# Patient Record
Sex: FEMALE | Race: BLACK OR AFRICAN AMERICAN | NOT HISPANIC OR LATINO | Employment: OTHER | URBAN - METROPOLITAN AREA
[De-identification: names, ages, dates, MRNs, and addresses within clinical notes are randomized per-mention and may not be internally consistent; named-entity substitution may affect disease eponyms.]

---

## 2019-06-19 ENCOUNTER — HOSPITAL ENCOUNTER (EMERGENCY)
Facility: HOSPITAL | Age: 69
Discharge: HOME | End: 2019-06-19
Attending: EMERGENCY MEDICINE
Payer: MEDICARE

## 2019-06-19 VITALS
RESPIRATION RATE: 20 BRPM | TEMPERATURE: 98 F | DIASTOLIC BLOOD PRESSURE: 73 MMHG | HEART RATE: 61 BPM | SYSTOLIC BLOOD PRESSURE: 144 MMHG | OXYGEN SATURATION: 98 %
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
## 2019-06-19 DIAGNOSIS — S02.2XXB OPEN FRACTURE OF NASAL BONE, INITIAL ENCOUNTER: Primary | ICD-10-CM

## 2019-06-19 DIAGNOSIS — R04.0 EPISTAXIS: ICD-10-CM

## 2019-06-19 DIAGNOSIS — S09.90XA INJURY OF HEAD, INITIAL ENCOUNTER: ICD-10-CM

## 2019-06-19 LAB
ABO + RH BLD: NORMAL
ALBUMIN SERPL BCP-MCNC: 3.6 G/DL (ref 3.5–5.2)
ALP SERPL-CCNC: 74 U/L (ref 55–135)
ALT SERPL W/O P-5'-P-CCNC: 18 U/L (ref 10–44)
ANION GAP SERPL CALC-SCNC: 9 MMOL/L (ref 8–16)
AST SERPL-CCNC: 20 U/L (ref 10–40)
BASOPHILS # BLD AUTO: 0.02 K/UL (ref 0–0.2)
BASOPHILS NFR BLD: 0.2 % (ref 0–1.9)
BILIRUB SERPL-MCNC: 0.3 MG/DL (ref 0.1–1)
BLD GP AB SCN CELLS X3 SERPL QL: NORMAL
BUN SERPL-MCNC: 17 MG/DL (ref 8–23)
CALCIUM SERPL-MCNC: 9.8 MG/DL (ref 8.7–10.5)
CHLORIDE SERPL-SCNC: 106 MMOL/L (ref 95–110)
CO2 SERPL-SCNC: 26 MMOL/L (ref 23–29)
CREAT SERPL-MCNC: 1.2 MG/DL (ref 0.5–1.4)
DIFFERENTIAL METHOD: NORMAL
EOSINOPHIL # BLD AUTO: 0.1 K/UL (ref 0–0.5)
EOSINOPHIL NFR BLD: 1.1 % (ref 0–8)
ERYTHROCYTE [DISTWIDTH] IN BLOOD BY AUTOMATED COUNT: 13.8 % (ref 11.5–14.5)
EST. GFR  (AFRICAN AMERICAN): 53 ML/MIN/1.73 M^2
EST. GFR  (NON AFRICAN AMERICAN): 46 ML/MIN/1.73 M^2
GLUCOSE SERPL-MCNC: 100 MG/DL (ref 70–110)
HCT VFR BLD AUTO: 43.6 % (ref 37–48.5)
HGB BLD-MCNC: 14.1 G/DL (ref 12–16)
INR PPP: 1 (ref 0.8–1.2)
LYMPHOCYTES # BLD AUTO: 2.6 K/UL (ref 1–4.8)
LYMPHOCYTES NFR BLD: 31.1 % (ref 18–48)
MCH RBC QN AUTO: 30.3 PG (ref 27–31)
MCHC RBC AUTO-ENTMCNC: 32.3 G/DL (ref 32–36)
MCV RBC AUTO: 94 FL (ref 82–98)
MONOCYTES # BLD AUTO: 0.6 K/UL (ref 0.3–1)
MONOCYTES NFR BLD: 6.9 % (ref 4–15)
NEUTROPHILS # BLD AUTO: 5 K/UL (ref 1.8–7.7)
NEUTROPHILS NFR BLD: 60.6 % (ref 38–73)
PLATELET # BLD AUTO: 233 K/UL (ref 150–350)
PMV BLD AUTO: 10.6 FL (ref 9.2–12.9)
POTASSIUM SERPL-SCNC: 3.7 MMOL/L (ref 3.5–5.1)
PROT SERPL-MCNC: 7.3 G/DL (ref 6–8.4)
PROTHROMBIN TIME: 10 SEC (ref 9–12.5)
RBC # BLD AUTO: 4.66 M/UL (ref 4–5.4)
SODIUM SERPL-SCNC: 141 MMOL/L (ref 136–145)
WBC # BLD AUTO: 8.29 K/UL (ref 3.9–12.7)

## 2019-06-19 PROCEDURE — 96374 THER/PROPH/DIAG INJ IV PUSH: CPT

## 2019-06-19 PROCEDURE — 96375 TX/PRO/DX INJ NEW DRUG ADDON: CPT | Mod: 59

## 2019-06-19 PROCEDURE — 63600175 PHARM REV CODE 636 W HCPCS: Performed by: EMERGENCY MEDICINE

## 2019-06-19 PROCEDURE — 80053 COMPREHEN METABOLIC PANEL: CPT

## 2019-06-19 PROCEDURE — 25000003 PHARM REV CODE 250: Performed by: EMERGENCY MEDICINE

## 2019-06-19 PROCEDURE — 85610 PROTHROMBIN TIME: CPT

## 2019-06-19 PROCEDURE — 99284 EMERGENCY DEPT VISIT MOD MDM: CPT | Mod: 25

## 2019-06-19 PROCEDURE — 85025 COMPLETE CBC W/AUTO DIFF WBC: CPT

## 2019-06-19 PROCEDURE — 86901 BLOOD TYPING SEROLOGIC RH(D): CPT

## 2019-06-19 RX ORDER — ONDANSETRON 2 MG/ML
4 INJECTION INTRAMUSCULAR; INTRAVENOUS
Status: COMPLETED | OUTPATIENT
Start: 2019-06-19 | End: 2019-06-19

## 2019-06-19 RX ORDER — OXYMETAZOLINE HCL 0.05 %
1 SPRAY, NON-AEROSOL (ML) NASAL 2 TIMES DAILY
Qty: 15 ML | Refills: 0 | Status: SHIPPED | OUTPATIENT
Start: 2019-06-19 | End: 2019-06-22

## 2019-06-19 RX ORDER — OXYMETAZOLINE HCL 0.05 %
1 SPRAY, NON-AEROSOL (ML) NASAL
Status: COMPLETED | OUTPATIENT
Start: 2019-06-19 | End: 2019-06-19

## 2019-06-19 RX ORDER — HYDROCODONE BITARTRATE AND ACETAMINOPHEN 5; 325 MG/1; MG/1
1 TABLET ORAL EVERY 4 HOURS PRN
Qty: 12 TABLET | Refills: 0 | Status: SHIPPED | OUTPATIENT
Start: 2019-06-19

## 2019-06-19 RX ORDER — AMOXICILLIN AND CLAVULANATE POTASSIUM 875; 125 MG/1; MG/1
1 TABLET, FILM COATED ORAL 2 TIMES DAILY
Qty: 14 TABLET | Refills: 0 | Status: SHIPPED | OUTPATIENT
Start: 2019-06-19

## 2019-06-19 RX ORDER — LIDOCAINE HYDROCHLORIDE 10 MG/ML
5 INJECTION, SOLUTION EPIDURAL; INFILTRATION; INTRACAUDAL; PERINEURAL
Status: DISCONTINUED | OUTPATIENT
Start: 2019-06-19 | End: 2019-06-19

## 2019-06-19 RX ORDER — MORPHINE SULFATE 4 MG/ML
4 INJECTION, SOLUTION INTRAMUSCULAR; INTRAVENOUS
Status: COMPLETED | OUTPATIENT
Start: 2019-06-19 | End: 2019-06-19

## 2019-06-19 RX ORDER — TRANEXAMIC ACID 100 MG/ML
1000 INJECTION, SOLUTION INTRAVENOUS
Status: COMPLETED | OUTPATIENT
Start: 2019-06-19 | End: 2019-06-19

## 2019-06-19 RX ADMIN — Medication 1 SPRAY: at 02:06

## 2019-06-19 RX ADMIN — TRANEXAMIC ACID 1000 MG: 100 INJECTION, SOLUTION INTRAVENOUS at 03:06

## 2019-06-19 RX ADMIN — ONDANSETRON 4 MG: 2 INJECTION INTRAMUSCULAR; INTRAVENOUS at 03:06

## 2019-06-19 RX ADMIN — MORPHINE SULFATE 4 MG: 4 INJECTION INTRAVENOUS at 03:06

## 2019-06-19 NOTE — ED PROVIDER NOTES
Encounter Date: 6/19/2019    SCRIBE #1 NOTE: I, Michelle Ahumada, am scribing for, and in the presence of,  Dr. Lefort. I have scribed the entire note.       History     Chief Complaint   Patient presents with    Fall     pt presents to Ed today via EMS who reports pt tripped on a toy and landed face first on floor. + nasal bleeding pt reports she takes ASA 81 mg daily      Time seen by provider: 2:38 PM    This is a 69 y.o. female who presents to the ED via EMS due to a trip and fall. Patient landed face first onto the floor and started bleeding from her nose. Patient is holding a towel to her nose and is actively bleeding in ED. Patient is awake, alert, and oriented x 3. She also complains of a HA. Patient denies LOC, back pain, neck pain, trouble breathing, or any other symptoms at this time. EMS personnel reports that patient takes 81 mg of aspirin daily. Patient has a PMHx of HTN.    The history is provided by the patient and the EMS personnel.     Review of patient's allergies indicates:   Allergen Reactions    Adhesive     Erythromycin      Past Medical History:   Diagnosis Date    Gout     Hypertension      Past Surgical History:   Procedure Laterality Date    ANKLE SURGERY      CHOLECYSTECTOMY      HYSTERECTOMY      TONSILLECTOMY      TUBAL LIGATION       No family history on file.  Social History     Tobacco Use    Smoking status: Never Smoker   Substance Use Topics    Alcohol use: No    Drug use: No     Review of Systems   Constitutional: Negative for chills and fever.   HENT: Positive for nosebleeds. Negative for congestion, rhinorrhea and sore throat.    Eyes: Negative for redness and visual disturbance.   Respiratory: Negative for cough, shortness of breath and wheezing.    Cardiovascular: Negative for chest pain and palpitations.   Gastrointestinal: Negative for abdominal pain, diarrhea, nausea and vomiting.   Genitourinary: Negative for dysuria and hematuria.   Musculoskeletal: Negative  for back pain, myalgias and neck pain.   Skin: Positive for wound. Negative for rash.   Neurological: Positive for headaches. Negative for dizziness, weakness and light-headedness.   Psychiatric/Behavioral: Negative for confusion.   All other systems reviewed and are negative.      Physical Exam     Initial Vitals   BP Pulse Resp Temp SpO2   06/19/19 1448 06/19/19 1448 -- 06/19/19 1557 06/19/19 1448   (!) 187/93 73  98.3 °F (36.8 °C) 98 %      MAP       --                Physical Exam    Nursing note and vitals reviewed.  Constitutional: She appears well-developed and well-nourished. No distress.   HENT:   Head: Head is without raccoon's eyes and without Bond's sign.       Nose: Nasal deformity and septal deviation present. No nasal septal hematoma. Epistaxis is observed. Right sinus exhibits no maxillary sinus tenderness and no frontal sinus tenderness. Left sinus exhibits no maxillary sinus tenderness and no frontal sinus tenderness.   Mouth/Throat: No trismus in the jaw.   Eyes: Conjunctivae and EOM are normal.   Neck: Normal range of motion, full passive range of motion without pain and phonation normal. Neck supple.   No midline tenderness   Cardiovascular: Normal rate, regular rhythm and intact distal pulses.   Pulmonary/Chest: Breath sounds normal. No respiratory distress.   Abdominal: Soft. There is no tenderness.   Musculoskeletal: Normal range of motion. She exhibits no edema or tenderness.   Neurological: She is alert and oriented to person, place, and time. She has normal strength. No sensory deficit. GCS score is 15. GCS eye subscore is 4. GCS verbal subscore is 5. GCS motor subscore is 6.   Skin: Skin is warm and dry. Capillary refill takes less than 2 seconds. No pallor.   Psychiatric: She has a normal mood and affect.         ED Course   Procedures  Labs Reviewed   COMPREHENSIVE METABOLIC PANEL - Abnormal; Notable for the following components:       Result Value    eGFR if  53 (*)      eGFR if non  46 (*)     All other components within normal limits   CBC W/ AUTO DIFFERENTIAL   PROTIME-INR   TYPE & SCREEN          X-Rays:   Independently Interpreted Readings:   Other Readings:  Reviewed by myself, read by radiology.    Imaging Results          CT Maxillofacial Without Contrast (Final result)  Result time 06/19/19 16:50:25    Final result by Liz Moe MD (06/19/19 16:50:25)                 Impression:      Not significantly displaced nasal bone fracture.    Small air-fluid level in the right maxillary sinus, suggestive of a small hemosinus.    Mild nasal septal deviation toward the left.    Small right nomi bullosa.      Electronically signed by: Liz Moe MD  Date:    06/19/2019  Time:    16:50             Narrative:    EXAMINATION:  CT MAXILLOFACIAL WITHOUT CONTRAST    CLINICAL HISTORY:  Facial fracture(s);    TECHNIQUE:  Low dose axial images, sagittal and coronal reformations were obtained through the face.  Contrast was not administered.    COMPARISON:  None    FINDINGS:  The intracranial content visualized appear normal.  There is a small air-fluid level in the right maxillary sinus concerning for a hemosinus.  The remainder of the paranasal sinuses are well aerated.  The visualized mastoid air cells are well aerated.  There is a relatively nondisplaced nasal bone fracture.  The nasal septum is deviated toward the left.  The bilateral orbital walls and bilateral orbital rim appear to be intact.  The bilateral temporomandibular joints and zygomatic arches are intact.  The bilateral pterygoid bones are intact.  The mandible and maxilla are intact.  Secretion noted in the nasopharynx on the right.  The remainder of the visualized soft tissues appear within normal limits.  The upper cervical spine appear normal.                               CT Head Without Contrast (Final result)  Result time 06/19/19 16:48:47    Final result by Den Briones MD  (06/19/19 16:48:47)                 Impression:      Right nasal bone fracture.  No acute intracranial abnormality.      Electronically signed by: Den Briones MD  Date:    06/19/2019  Time:    16:48             Narrative:    EXAMINATION:  CT HEAD WITHOUT CONTRAST    CLINICAL HISTORY:  Head trauma, headache;    TECHNIQUE:  Low dose axial CT images obtained throughout the head without intravenous contrast. Sagittal and coronal reconstructions were performed.    COMPARISON:  None.    FINDINGS:  Intracranial compartment:    Ventricles and sulci are normal in size for age without evidence of hydrocephalus. No extra-axial blood or fluid collections.    The brain parenchyma appears normal. No parenchymal mass, hemorrhage, edema or major vascular distribution infarct.    Skull/extracranial contents (limited evaluation- mastoid air cells and paranasal sinuses are essentially clear.  There is fluid and membrane thickening in the right maxillary sinus.  Right nasal fracture is seen.                              Medical Decision Making:   Differential Diagnosis:   Epistaxis, facial fracture, intracranial hemorrhage, C-spine injury, septal hematoma  Independently Interpreted Test(s):   I have ordered and independently interpreted X-rays - see prior notes.  Clinical Tests:   Lab Tests: Reviewed and Ordered  Radiological Study: Ordered and Reviewed  ED Management:  Clear trip and fall mechanism.  Bleeding controlled with direct pressure, ice, and subsequent administration of Tranxenemic acid.  Observed over duration department stay without recurrence of bleeding.  Intracranial hemorrhage excluded by imaging.  C-spine cleared clinically.  Facial imaging remarkable for nasal fracture.  There is no appreciable septal hematoma though deviation noted on imaging.  Patient will be discharged with antibiotics, nasal spray, pain control, ENT follow-up.  Discussed indications for ED return, expected course of injury, and importance of  continued follow-up                      Clinical Impression:     1. Open fracture of nasal bone, initial encounter    2. Epistaxis    3. Injury of head, initial encounter    4. Fall, initial encounter        Disposition:   Disposition: Discharged  Condition: Stable       Scribe attestation I, Dr. Guy Lefort, personally performed the services described in this documentation. All medical record entries made by the scribe were at my direction and in my presence. I have reviewed the chart and agree that the record reflects my personal performance and is accurate and complete. Guy Lefort, MD.  6:51 PM 06/19/2019                   Guy J. Lefort, MD  06/19/19 [id number]

## 2019-06-19 NOTE — ED NOTES
"Patient in bed, alert and oriented, no bleeding at this time, states pain is "tolerable now", call light within reach, will continue to monitor   "

## 2020-08-26 NOTE — ED TRIAGE NOTES
Pt arrived from home via EMS post fall at home with epitaxis, patient in bed alert and oriented, denies LOC   yes

## 2022-06-02 ENCOUNTER — APPOINTMENT (RX ONLY)
Dept: URBAN - METROPOLITAN AREA CLINIC 87 | Facility: CLINIC | Age: 72
Setting detail: DERMATOLOGY
End: 2022-06-02

## 2022-06-02 DIAGNOSIS — L65.9 NONSCARRING HAIR LOSS, UNSPECIFIED: ICD-10-CM | Status: INADEQUATELY CONTROLLED

## 2022-06-02 PROCEDURE — ? COUNSELING

## 2022-06-02 PROCEDURE — ? PRESCRIPTION MEDICATION MANAGEMENT

## 2022-06-02 PROCEDURE — 11104 PUNCH BX SKIN SINGLE LESION: CPT

## 2022-06-02 PROCEDURE — ? BIOPSY BY PUNCH METHOD

## 2022-06-02 ASSESSMENT — LOCATION SIMPLE DESCRIPTION DERM
LOCATION SIMPLE: LEFT SCALP
LOCATION SIMPLE: LEFT FOREHEAD
LOCATION SIMPLE: RIGHT SCALP

## 2022-06-02 ASSESSMENT — LOCATION DETAILED DESCRIPTION DERM
LOCATION DETAILED: LEFT MEDIAL FRONTAL SCALP
LOCATION DETAILED: LEFT SUPERIOR FOREHEAD
LOCATION DETAILED: RIGHT CENTRAL FRONTAL SCALP

## 2022-06-02 ASSESSMENT — LOCATION ZONE DERM
LOCATION ZONE: FACE
LOCATION ZONE: SCALP

## 2022-06-02 NOTE — PROCEDURE: PRESCRIPTION MEDICATION MANAGEMENT
Detail Level: Zone
Render In Strict Bullet Format?: No
Plan: Patient will follow up in two weeks for SR and discuss results and treatment plan.

## 2022-06-16 ENCOUNTER — APPOINTMENT (RX ONLY)
Dept: URBAN - METROPOLITAN AREA CLINIC 87 | Facility: CLINIC | Age: 72
Setting detail: DERMATOLOGY
End: 2022-06-16

## 2022-06-16 DIAGNOSIS — Z48.02 ENCOUNTER FOR REMOVAL OF SUTURES: ICD-10-CM

## 2022-06-16 DIAGNOSIS — L66.81 CENTRAL CENTRIFUGAL CICATRICIAL ALOPECIA: ICD-10-CM | Status: INADEQUATELY CONTROLLED

## 2022-06-16 DIAGNOSIS — L66.8 OTHER CICATRICIAL ALOPECIA: ICD-10-CM | Status: INADEQUATELY CONTROLLED

## 2022-06-16 PROCEDURE — ? PRESCRIPTION MEDICATION MANAGEMENT

## 2022-06-16 PROCEDURE — ? SUTURE REMOVAL (GLOBAL PERIOD)

## 2022-06-16 PROCEDURE — 99214 OFFICE O/P EST MOD 30 MIN: CPT

## 2022-06-16 PROCEDURE — ? COUNSELING

## 2022-06-16 PROCEDURE — ? PRESCRIPTION

## 2022-06-16 RX ORDER — DOXYCYCLINE 100 MG/1
CAPSULE ORAL
Qty: 60 | Refills: 2 | Status: ERX

## 2022-06-16 RX ORDER — CLOBETASOL PROPIONATE 0.5 MG/ML
SOLUTION TOPICAL
Qty: 50 | Refills: 2 | Status: ERX | COMMUNITY
Start: 2022-06-16

## 2022-06-16 RX ADMIN — CLOBETASOL PROPIONATE 1: 0.5 SOLUTION TOPICAL at 00:00

## 2022-06-16 ASSESSMENT — LOCATION ZONE DERM: LOCATION ZONE: SCALP

## 2022-06-16 ASSESSMENT — LOCATION DETAILED DESCRIPTION DERM
LOCATION DETAILED: RIGHT MEDIAL FRONTAL SCALP
LOCATION DETAILED: RIGHT CENTRAL FRONTAL SCALP
LOCATION DETAILED: LEFT CENTRAL FRONTAL SCALP
LOCATION DETAILED: LEFT CENTRAL PARIETAL SCALP

## 2022-06-16 ASSESSMENT — LOCATION SIMPLE DESCRIPTION DERM
LOCATION SIMPLE: SCALP
LOCATION SIMPLE: RIGHT SCALP
LOCATION SIMPLE: LEFT SCALP

## 2022-06-16 NOTE — PROCEDURE: SUTURE REMOVAL (GLOBAL PERIOD)
Detail Level: Simple
Add 84067 Cpt? (Important Note: In 2017 The Use Of 51557 Is Being Tracked By Cms To Determine Future Global Period Reimbursement For Global Periods): no

## 2022-06-16 NOTE — PROCEDURE: PRESCRIPTION MEDICATION MANAGEMENT
Initiate Treatment: clobetasol 0.05 % scalp solution \\nApply to areas of hair loss on scalp three times a week\\nME - female hair loss (10% minoxidil, 0.01% latanoprost, 0.2% biotin)\\nSig: Apply to scalp QAM\\ndoxycycline monohydrate 100 mg capsule \\nTake 1 cap po BID, PC
Detail Level: Zone
Render In Strict Bullet Format?: No
Plan: Treatment-1\\nILK-5\\n3cc injected into scalp \\nLot#[id number]\\nEx 09/23\\nInjected by Dr. MUE

## 2022-07-28 ENCOUNTER — APPOINTMENT (RX ONLY)
Dept: URBAN - METROPOLITAN AREA CLINIC 87 | Facility: CLINIC | Age: 72
Setting detail: DERMATOLOGY
End: 2022-07-28

## 2022-07-28 ENCOUNTER — RX ONLY (OUTPATIENT)
Age: 72
Setting detail: RX ONLY
End: 2022-07-28

## 2022-07-28 DIAGNOSIS — L66.81 CENTRAL CENTRIFUGAL CICATRICIAL ALOPECIA: ICD-10-CM

## 2022-07-28 DIAGNOSIS — L66.8 OTHER CICATRICIAL ALOPECIA: ICD-10-CM

## 2022-07-28 PROCEDURE — ? PRESCRIPTION MEDICATION MANAGEMENT

## 2022-07-28 PROCEDURE — ? COUNSELING

## 2022-07-28 PROCEDURE — 99214 OFFICE O/P EST MOD 30 MIN: CPT

## 2022-07-28 RX ORDER — CLOBETASOL PROPIONATE 0.5 MG/ML
SOLUTION TOPICAL
Qty: 50 | Refills: 2 | Status: ERX

## 2022-07-28 ASSESSMENT — LOCATION DETAILED DESCRIPTION DERM
LOCATION DETAILED: RIGHT MEDIAL FRONTAL SCALP
LOCATION DETAILED: LEFT CENTRAL FRONTAL SCALP
LOCATION DETAILED: RIGHT CENTRAL FRONTAL SCALP

## 2022-07-28 ASSESSMENT — LOCATION ZONE DERM: LOCATION ZONE: SCALP

## 2022-07-28 ASSESSMENT — LOCATION SIMPLE DESCRIPTION DERM
LOCATION SIMPLE: LEFT SCALP
LOCATION SIMPLE: RIGHT SCALP

## 2022-07-28 NOTE — PROCEDURE: PRESCRIPTION MEDICATION MANAGEMENT
Detail Level: Zone
Render In Strict Bullet Format?: No
Plan: Treatment-2\\nILK-5\\n3cc injected into scalp \\nLot#[id number]\\nEx 10/23\\nInjected by Dr. MUE
Continue Regimen: clobetasol 0.05 % scalp solution \\nApply to areas of hair loss on scalp three times a week\\n\\nME - female hair loss (10% minoxidil, 0.01% latanoprost, 0.2% biotin)\\nSig: Apply to scalp QAM\\n\\ndoxycycline monohydrate 100 mg capsule \\nTake 1 cap po BID, PC

## 2022-09-08 ENCOUNTER — APPOINTMENT (RX ONLY)
Dept: URBAN - METROPOLITAN AREA CLINIC 87 | Facility: CLINIC | Age: 72
Setting detail: DERMATOLOGY
End: 2022-09-08

## 2022-09-08 ENCOUNTER — RX ONLY (OUTPATIENT)
Age: 72
Setting detail: RX ONLY
End: 2022-09-08

## 2022-09-08 DIAGNOSIS — L66.8 OTHER CICATRICIAL ALOPECIA: ICD-10-CM

## 2022-09-08 DIAGNOSIS — L66.81 CENTRAL CENTRIFUGAL CICATRICIAL ALOPECIA: ICD-10-CM

## 2022-09-08 PROCEDURE — 99214 OFFICE O/P EST MOD 30 MIN: CPT

## 2022-09-08 PROCEDURE — ? PRESCRIPTION MEDICATION MANAGEMENT

## 2022-09-08 PROCEDURE — ? COUNSELING

## 2022-09-08 RX ORDER — CLOBETASOL PROPIONATE 0.5 MG/ML
SOLUTION TOPICAL
Qty: 50 | Refills: 2 | Status: ERX

## 2022-09-08 ASSESSMENT — LOCATION DETAILED DESCRIPTION DERM
LOCATION DETAILED: RIGHT MEDIAL FRONTAL SCALP
LOCATION DETAILED: RIGHT CENTRAL FRONTAL SCALP
LOCATION DETAILED: LEFT CENTRAL FRONTAL SCALP

## 2022-09-08 ASSESSMENT — LOCATION SIMPLE DESCRIPTION DERM
LOCATION SIMPLE: RIGHT SCALP
LOCATION SIMPLE: LEFT SCALP

## 2022-09-08 ASSESSMENT — LOCATION ZONE DERM: LOCATION ZONE: SCALP

## 2022-09-08 NOTE — PROCEDURE: PRESCRIPTION MEDICATION MANAGEMENT
Detail Level: Zone
Render In Strict Bullet Format?: No
Plan: Treatment-3\\nILK-5\\n3cc injected into scalp \\nLot#[id number] / [id number]\\nEx 10/23 / 02/24\\nInjected by Dr. CREWS\\n\\nBriefly discussed shampoos.
Continue Regimen: clobetasol 0.05 % scalp solution \\nApply to areas of hair loss on scalp three times a week\\n\\nME - female hair loss (10% minoxidil, 0.01% latanoprost, 0.2% biotin)\\nSig: Apply to scalp QAM\\n\\ndoxycycline monohydrate 100 mg capsule \\nTake 1 cap po BID, PC

## 2022-09-27 ENCOUNTER — RX ONLY (OUTPATIENT)
Age: 72
Setting detail: RX ONLY
End: 2022-09-27

## 2022-09-28 ENCOUNTER — RX ONLY (OUTPATIENT)
Age: 72
Setting detail: RX ONLY
End: 2022-09-28

## 2022-09-28 RX ORDER — DOXYCYCLINE 100 MG/1
CAPSULE ORAL
Qty: 60 | Refills: 2 | Status: ERX

## 2022-10-20 ENCOUNTER — APPOINTMENT (RX ONLY)
Dept: URBAN - METROPOLITAN AREA CLINIC 87 | Facility: CLINIC | Age: 72
Setting detail: DERMATOLOGY
End: 2022-10-20

## 2022-10-20 ENCOUNTER — RX ONLY (OUTPATIENT)
Age: 72
Setting detail: RX ONLY
End: 2022-10-20

## 2022-10-20 DIAGNOSIS — L66.8 OTHER CICATRICIAL ALOPECIA: ICD-10-CM

## 2022-10-20 DIAGNOSIS — L66.81 CENTRAL CENTRIFUGAL CICATRICIAL ALOPECIA: ICD-10-CM

## 2022-10-20 PROCEDURE — ? COUNSELING

## 2022-10-20 PROCEDURE — ? PRESCRIPTION MEDICATION MANAGEMENT

## 2022-10-20 PROCEDURE — 99214 OFFICE O/P EST MOD 30 MIN: CPT

## 2022-10-20 RX ORDER — CLOBETASOL PROPIONATE 0.5 MG/ML
SOLUTION TOPICAL
Qty: 50 | Refills: 2 | Status: ERX

## 2022-10-20 ASSESSMENT — LOCATION ZONE DERM: LOCATION ZONE: SCALP

## 2022-10-20 ASSESSMENT — LOCATION SIMPLE DESCRIPTION DERM
LOCATION SIMPLE: LEFT SCALP
LOCATION SIMPLE: RIGHT SCALP

## 2022-10-20 NOTE — PROCEDURE: PRESCRIPTION MEDICATION MANAGEMENT
Detail Level: Zone
Render In Strict Bullet Format?: No
Plan: Treatment-4\\nILK-5\\n3cc injected into scalp \\nLot# [id number]\\nExp 02/24\\nInjected by Dr. MUE
Continue Regimen: clobetasol 0.05 % scalp solution \\nApply to areas of hair loss on scalp three times a week\\n\\nME - female hair loss (10% minoxidil, 0.2% biotin)\\nSig: Apply to scalp QAM\\n\\ndoxycycline monohydrate 100 mg capsule \\nTake 1 cap po BID, PC

## 2022-11-30 ENCOUNTER — APPOINTMENT (RX ONLY)
Dept: URBAN - METROPOLITAN AREA CLINIC 87 | Facility: CLINIC | Age: 72
Setting detail: DERMATOLOGY
End: 2022-11-30

## 2022-11-30 DIAGNOSIS — L66.8 OTHER CICATRICIAL ALOPECIA: ICD-10-CM

## 2022-11-30 DIAGNOSIS — L66.81 CENTRAL CENTRIFUGAL CICATRICIAL ALOPECIA: ICD-10-CM

## 2022-11-30 PROCEDURE — 99214 OFFICE O/P EST MOD 30 MIN: CPT

## 2022-11-30 PROCEDURE — ? PRESCRIPTION MEDICATION MANAGEMENT

## 2022-11-30 PROCEDURE — ? COUNSELING

## 2022-11-30 ASSESSMENT — LOCATION ZONE DERM: LOCATION ZONE: SCALP

## 2022-11-30 ASSESSMENT — LOCATION SIMPLE DESCRIPTION DERM
LOCATION SIMPLE: LEFT SCALP
LOCATION SIMPLE: RIGHT SCALP

## 2022-11-30 NOTE — PROCEDURE: PRESCRIPTION MEDICATION MANAGEMENT
Detail Level: Zone
Render In Strict Bullet Format?: No
Plan: Treatment-5\\nILK-5\\n3cc injected into scalp \\nLot# [id number]\\nExp 04/24\\nInjected by Dr. MUE
Continue Regimen: clobetasol 0.05 % scalp solution \\nApply to areas of hair loss on scalp three times a week\\n\\nME - female hair loss (10% minoxidil, 0.2% biotin)\\nSig: Apply to scalp QAM\\n\\ndoxycycline monohydrate 100 mg capsule \\nTake 1 cap po BID, PC

## 2022-12-29 ENCOUNTER — RX ONLY (OUTPATIENT)
Age: 72
Setting detail: RX ONLY
End: 2022-12-29

## 2022-12-29 RX ORDER — CLOBETASOL PROPIONATE 0.5 MG/ML
SOLUTION TOPICAL
Qty: 50 | Refills: 2 | Status: ERX

## 2022-12-30 ENCOUNTER — RX ONLY (OUTPATIENT)
Age: 72
Setting detail: RX ONLY
End: 2022-12-30

## 2022-12-30 RX ORDER — DOXYCYCLINE 100 MG/1
CAPSULE ORAL
Qty: 60 | Refills: 1 | Status: ERX

## 2023-01-11 ENCOUNTER — APPOINTMENT (RX ONLY)
Dept: URBAN - METROPOLITAN AREA CLINIC 87 | Facility: CLINIC | Age: 73
Setting detail: DERMATOLOGY
End: 2023-01-11

## 2023-01-11 DIAGNOSIS — L66.81 CENTRAL CENTRIFUGAL CICATRICIAL ALOPECIA: ICD-10-CM

## 2023-01-11 DIAGNOSIS — L66.8 OTHER CICATRICIAL ALOPECIA: ICD-10-CM

## 2023-01-11 PROCEDURE — ? PRESCRIPTION MEDICATION MANAGEMENT

## 2023-01-11 PROCEDURE — 99214 OFFICE O/P EST MOD 30 MIN: CPT

## 2023-01-11 PROCEDURE — ? COUNSELING

## 2023-01-11 PROCEDURE — ? PRESCRIPTION

## 2023-01-11 RX ORDER — FINASTERIDE 5 MG/1
TABLET, FILM COATED ORAL
Qty: 90 | Refills: 1 | Status: ERX

## 2023-01-11 ASSESSMENT — LOCATION ZONE DERM: LOCATION ZONE: SCALP

## 2023-01-11 ASSESSMENT — LOCATION DETAILED DESCRIPTION DERM
LOCATION DETAILED: RIGHT CENTRAL FRONTAL SCALP
LOCATION DETAILED: RIGHT MEDIAL FRONTAL SCALP
LOCATION DETAILED: LEFT CENTRAL FRONTAL SCALP

## 2023-01-11 ASSESSMENT — LOCATION SIMPLE DESCRIPTION DERM
LOCATION SIMPLE: RIGHT SCALP
LOCATION SIMPLE: LEFT SCALP

## 2023-01-11 NOTE — PROCEDURE: PRESCRIPTION MEDICATION MANAGEMENT
Initiate Treatment: finasteride 5 mg tablet \\n1 tablet QD
Detail Level: Zone
Render In Strict Bullet Format?: No
Discontinue Regimen: doxycycline monohydrate 100 mg capsule \\nTake 1 cap po BID, PC
Modify Regimen: ME - female hair loss (10% minoxidil, 0.2% biotin)\\nSig: Apply to scalp QAM\\n\\nChange to all ingredients, including latanoprost and Finasteride
Plan: Treatment-6\\nILK-5\\n3cc injected into scalp \\nLot# [id number]\\nExp 06/24\\nInjected by Dr. CREWS\\n\\nDone with ILK series for now.
Continue Regimen: clobetasol 0.05 % scalp solution \\nApply to areas of hair loss on scalp three times a week

## 2023-03-02 ENCOUNTER — RX ONLY (OUTPATIENT)
Age: 73
Setting detail: RX ONLY
End: 2023-03-02

## 2023-03-02 RX ORDER — CLOBETASOL PROPIONATE 0.5 MG/ML
SOLUTION TOPICAL
Qty: 50 | Refills: 2 | Status: ERX

## 2023-07-11 ENCOUNTER — APPOINTMENT (RX ONLY)
Dept: URBAN - METROPOLITAN AREA CLINIC 87 | Facility: CLINIC | Age: 73
Setting detail: DERMATOLOGY
End: 2023-07-11

## 2023-07-11 ENCOUNTER — RX ONLY (OUTPATIENT)
Age: 73
Setting detail: RX ONLY
End: 2023-07-11

## 2023-07-11 DIAGNOSIS — L66.81 CENTRAL CENTRIFUGAL CICATRICIAL ALOPECIA: ICD-10-CM

## 2023-07-11 DIAGNOSIS — L66.8 OTHER CICATRICIAL ALOPECIA: ICD-10-CM

## 2023-07-11 PROCEDURE — 99214 OFFICE O/P EST MOD 30 MIN: CPT

## 2023-07-11 PROCEDURE — ? PRESCRIPTION MEDICATION MANAGEMENT

## 2023-07-11 PROCEDURE — ? COUNSELING

## 2023-07-11 PROCEDURE — ? PRESCRIPTION

## 2023-07-11 RX ORDER — DOXYCYCLINE 100 MG/1
CAPSULE ORAL DAILY
Qty: 60 | Refills: 2 | Status: ERX

## 2023-07-11 RX ORDER — CLOBETASOL PROPIONATE 0.5 MG/ML
SOLUTION TOPICAL
Qty: 50 | Refills: 2 | Status: ERX

## 2023-07-11 RX ORDER — FINASTERIDE 5 MG/1
TABLET, FILM COATED ORAL
Qty: 90 | Refills: 1 | Status: ERX

## 2023-07-11 ASSESSMENT — LOCATION ZONE DERM: LOCATION ZONE: SCALP

## 2023-07-11 ASSESSMENT — LOCATION SIMPLE DESCRIPTION DERM
LOCATION SIMPLE: LEFT SCALP
LOCATION SIMPLE: RIGHT SCALP

## 2023-07-11 NOTE — PROCEDURE: MIPS QUALITY
Quality 226: Preventive Care And Screening: Tobacco Use: Screening And Cessation Intervention: Patient screened for tobacco use and is an ex/non-smoker
Quality 111:Pneumonia Vaccination Status For Older Adults: Patient received any pneumococcal conjugate or polysaccharide vaccine on or after their 60th birthday and before the end of the measurement period
Detail Level: Detailed
Quality 130: Documentation Of Current Medications In The Medical Record: Current Medications Documented
Quality 110: Preventive Care And Screening: Influenza Immunization: Influenza Immunization Administered during Influenza season
Quality 431: Preventive Care And Screening: Unhealthy Alcohol Use - Screening: Patient not identified as an unhealthy alcohol user when screened for unhealthy alcohol use using a systematic screening method

## 2023-07-11 NOTE — PROCEDURE: PRESCRIPTION MEDICATION MANAGEMENT
Initiate Treatment: finasteride 5 mg tablet \\n1 tablet QD\\n\\ndoxycycline monohydrate 100 mg capsule \\nTake 1 cap po BID
Detail Level: Zone
Render In Strict Bullet Format?: No
Continue Regimen: clobetasol 0.05 % scalp solution \\nApply to areas of hair loss on scalp three times a week\\n\\nME - female hair loss (10% minoxidil, 0.2% biotin)\\nApply to scalp QAM

## 2023-07-14 ENCOUNTER — RX ONLY (OUTPATIENT)
Age: 73
Setting detail: RX ONLY
End: 2023-07-14

## 2023-10-11 ENCOUNTER — APPOINTMENT (RX ONLY)
Dept: URBAN - METROPOLITAN AREA CLINIC 87 | Facility: CLINIC | Age: 73
Setting detail: DERMATOLOGY
End: 2023-10-11

## 2023-10-11 ENCOUNTER — RX ONLY (OUTPATIENT)
Age: 73
Setting detail: RX ONLY
End: 2023-10-11

## 2023-10-11 DIAGNOSIS — L66.81 CENTRAL CENTRIFUGAL CICATRICIAL ALOPECIA: ICD-10-CM | Status: IMPROVED

## 2023-10-11 DIAGNOSIS — L66.8 OTHER CICATRICIAL ALOPECIA: ICD-10-CM | Status: IMPROVED

## 2023-10-11 PROCEDURE — ? COUNSELING

## 2023-10-11 PROCEDURE — ? PRESCRIPTION MEDICATION MANAGEMENT

## 2023-10-11 PROCEDURE — 99214 OFFICE O/P EST MOD 30 MIN: CPT

## 2023-10-11 RX ORDER — CLOBETASOL PROPIONATE 0.5 MG/ML
SOLUTION TOPICAL
Qty: 50 | Refills: 2 | Status: ERX

## 2023-10-11 ASSESSMENT — LOCATION DETAILED DESCRIPTION DERM
LOCATION DETAILED: LEFT CENTRAL FRONTAL SCALP
LOCATION DETAILED: RIGHT MEDIAL FRONTAL SCALP
LOCATION DETAILED: RIGHT CENTRAL FRONTAL SCALP

## 2023-10-11 ASSESSMENT — LOCATION SIMPLE DESCRIPTION DERM
LOCATION SIMPLE: LEFT SCALP
LOCATION SIMPLE: RIGHT SCALP

## 2023-10-11 ASSESSMENT — LOCATION ZONE DERM: LOCATION ZONE: SCALP

## 2023-10-11 NOTE — PROCEDURE: PRESCRIPTION MEDICATION MANAGEMENT
Detail Level: Zone
Render In Strict Bullet Format?: No
Continue Regimen: clobetasol 0.05 % scalp solution \\nApply to areas of hair loss on scalp 4 times a week
Defer Treatment (Provide Reason For Deferment In Text Field Below): Finasteride, Doxycycline, and Hair loss solution pt. states hair falling out, feels like it interacts with other medications, itching and bumpy skin.

## 2024-04-11 ENCOUNTER — APPOINTMENT (RX ONLY)
Dept: URBAN - METROPOLITAN AREA CLINIC 87 | Facility: CLINIC | Age: 74
Setting detail: DERMATOLOGY
End: 2024-04-11

## 2024-04-11 DIAGNOSIS — L66.8 OTHER CICATRICIAL ALOPECIA: ICD-10-CM

## 2024-04-11 DIAGNOSIS — L66.81 CENTRAL CENTRIFUGAL CICATRICIAL ALOPECIA: ICD-10-CM

## 2024-04-11 PROCEDURE — ? PRESCRIPTION

## 2024-04-11 PROCEDURE — ? PHOTO-DOCUMENTATION

## 2024-04-11 PROCEDURE — ? COUNSELING

## 2024-04-11 PROCEDURE — 99214 OFFICE O/P EST MOD 30 MIN: CPT

## 2024-04-11 PROCEDURE — ? PRESCRIPTION MEDICATION MANAGEMENT

## 2024-04-11 ASSESSMENT — LOCATION SIMPLE DESCRIPTION DERM
LOCATION SIMPLE: RIGHT SCALP
LOCATION SIMPLE: LEFT SCALP

## 2024-04-11 ASSESSMENT — LOCATION ZONE DERM: LOCATION ZONE: SCALP

## 2024-04-11 NOTE — PROCEDURE: PRESCRIPTION MEDICATION MANAGEMENT
Initiate Treatment: Tofacitinib 2% cream - (2% tofacitinib (bulk)) - Apply to affected areas on scalp twice a day
Detail Level: Zone
Render In Strict Bullet Format?: No
Discontinue Regimen: clobetasol 0.05 % scalp solution

## 2024-04-29 NOTE — PROCEDURE: BIOPSY BY PUNCH METHOD
Detail Level: Simple
Was A Bandage Applied: Yes
Punch Size In Mm: 4
Biopsy Type: H and E
Anesthesia Type: 1% lidocaine with epinephrine
Anesthesia Volume In Cc (Will Not Render If 0): 0.8
Additional Anesthesia Volume In Cc (Will Not Render If 0): 0
Hemostasis: None
Epidermal Sutures: 4-0 Nylon
Wound Care: Petrolatum
Dressing: pressure dressing
Suture Removal: 14 days
Patient Will Remove Sutures At Home?: No
Lab: [id number]
Lab Facility: [id number]
Consent: Written consent was obtained and risks were reviewed including but not limited to scarring, infection, bleeding, scabbing, incomplete removal, nerve damage and allergy to anesthesia.
Post-Care Instructions: I reviewed with the patient in detail post-care instructions. Patient is to keep the biopsy site dry overnight, and then apply bacitracin twice daily until healed. Patient may apply hydrogen peroxide soaks to remove any crusting.
Home Suture Removal Text: Patient was provided a home suture removal kit and will remove their sutures at home.  If they have any questions or difficulties they will call the office.
Notification Instructions: Patient will be notified of biopsy results. However, patient instructed to call the office if not contacted within 2 weeks.
Billing Type: Third-Party Bill
Information: Selecting Yes will display possible errors in your note based on the variables you have selected. This validation is only offered as a suggestion for you. PLEASE NOTE THAT THE VALIDATION TEXT WILL BE REMOVED WHEN YOU FINALIZE YOUR NOTE. IF YOU WANT TO FAX A PRELIMINARY NOTE YOU WILL NEED TO TOGGLE THIS TO 'NO' IF YOU DO NOT WANT IT IN YOUR FAXED NOTE.
declines

## 2024-07-05 ENCOUNTER — TELEPHONE (OUTPATIENT)
Dept: ORTHOPEDICS | Facility: CLINIC | Age: 74
End: 2024-07-05
Payer: MEDICARE

## 2024-07-05 ENCOUNTER — HOSPITAL ENCOUNTER (EMERGENCY)
Facility: OTHER | Age: 74
Discharge: HOME | End: 2024-07-05
Attending: EMERGENCY MEDICINE
Payer: MEDICARE

## 2024-07-05 VITALS
DIASTOLIC BLOOD PRESSURE: 74 MMHG | TEMPERATURE: 98 F | OXYGEN SATURATION: 99 % | RESPIRATION RATE: 19 BRPM | WEIGHT: 240 LBS | HEIGHT: 67 IN | SYSTOLIC BLOOD PRESSURE: 142 MMHG | BODY MASS INDEX: 37.67 KG/M2 | HEART RATE: 65 BPM

## 2024-07-05 DIAGNOSIS — M25.561 CHRONIC PAIN OF BOTH KNEES: ICD-10-CM

## 2024-07-05 DIAGNOSIS — I25.10 CORONARY ARTERY DISEASE, UNSPECIFIED VESSEL OR LESION TYPE, UNSPECIFIED WHETHER ANGINA PRESENT, UNSPECIFIED WHETHER NATIVE OR TRANSPLANTED HEART: ICD-10-CM

## 2024-07-05 DIAGNOSIS — Z86.73 HX-TIA (TRANSIENT ISCHEMIC ATTACK): ICD-10-CM

## 2024-07-05 DIAGNOSIS — M35.3 POLYMYALGIA: ICD-10-CM

## 2024-07-05 DIAGNOSIS — J44.9 CHRONIC OBSTRUCTIVE PULMONARY DISEASE, UNSPECIFIED COPD TYPE: ICD-10-CM

## 2024-07-05 DIAGNOSIS — N30.01 ACUTE CYSTITIS WITH HEMATURIA: Primary | ICD-10-CM

## 2024-07-05 DIAGNOSIS — M25.562 CHRONIC PAIN OF BOTH KNEES: ICD-10-CM

## 2024-07-05 DIAGNOSIS — G89.29 CHRONIC PAIN OF BOTH KNEES: ICD-10-CM

## 2024-07-05 LAB
ALBUMIN SERPL BCP-MCNC: 3.6 G/DL (ref 3.5–5.2)
ALP SERPL-CCNC: 84 U/L (ref 55–135)
ALT SERPL W/O P-5'-P-CCNC: 14 U/L (ref 10–44)
ANION GAP SERPL CALC-SCNC: 9 MMOL/L (ref 8–16)
AST SERPL-CCNC: 17 U/L (ref 10–40)
BACTERIA #/AREA URNS HPF: ABNORMAL /HPF
BASOPHILS # BLD AUTO: 0.04 K/UL (ref 0–0.2)
BASOPHILS NFR BLD: 0.5 % (ref 0–1.9)
BILIRUB SERPL-MCNC: 0.5 MG/DL (ref 0.1–1)
BILIRUB UR QL STRIP: NEGATIVE
BNP SERPL-MCNC: 84 PG/ML (ref 0–99)
BUN SERPL-MCNC: 13 MG/DL (ref 8–23)
CALCIUM SERPL-MCNC: 9.5 MG/DL (ref 8.7–10.5)
CHLORIDE SERPL-SCNC: 113 MMOL/L (ref 95–110)
CLARITY UR: CLEAR
CO2 SERPL-SCNC: 21 MMOL/L (ref 23–29)
COLOR UR: YELLOW
CREAT SERPL-MCNC: 1.2 MG/DL (ref 0.5–1.4)
DIFFERENTIAL METHOD BLD: NORMAL
EOSINOPHIL # BLD AUTO: 0.1 K/UL (ref 0–0.5)
EOSINOPHIL NFR BLD: 0.7 % (ref 0–8)
ERYTHROCYTE [DISTWIDTH] IN BLOOD BY AUTOMATED COUNT: 13.5 % (ref 11.5–14.5)
EST. GFR  (NO RACE VARIABLE): 47 ML/MIN/1.73 M^2
GLUCOSE SERPL-MCNC: 95 MG/DL (ref 70–110)
GLUCOSE UR QL STRIP: NEGATIVE
HCT VFR BLD AUTO: 41.7 % (ref 37–48.5)
HCV AB SERPL QL IA: NEGATIVE
HGB BLD-MCNC: 13.6 G/DL (ref 12–16)
HGB UR QL STRIP: NEGATIVE
HIV 1+2 AB+HIV1 P24 AG SERPL QL IA: NEGATIVE
IMM GRANULOCYTES # BLD AUTO: 0.01 K/UL (ref 0–0.04)
IMM GRANULOCYTES NFR BLD AUTO: 0.1 % (ref 0–0.5)
KETONES UR QL STRIP: NEGATIVE
LEUKOCYTE ESTERASE UR QL STRIP: ABNORMAL
LYMPHOCYTES # BLD AUTO: 2.2 K/UL (ref 1–4.8)
LYMPHOCYTES NFR BLD: 29.1 % (ref 18–48)
MCH RBC QN AUTO: 30.2 PG (ref 27–31)
MCHC RBC AUTO-ENTMCNC: 32.6 G/DL (ref 32–36)
MCV RBC AUTO: 93 FL (ref 82–98)
MICROSCOPIC COMMENT: ABNORMAL
MONOCYTES # BLD AUTO: 0.5 K/UL (ref 0.3–1)
MONOCYTES NFR BLD: 6.7 % (ref 4–15)
NEUTROPHILS # BLD AUTO: 4.7 K/UL (ref 1.8–7.7)
NEUTROPHILS NFR BLD: 62.9 % (ref 38–73)
NITRITE UR QL STRIP: POSITIVE
NRBC BLD-RTO: 0 /100 WBC
PH UR STRIP: 5 [PH] (ref 5–8)
PLATELET # BLD AUTO: 193 K/UL (ref 150–450)
PMV BLD AUTO: 10.7 FL (ref 9.2–12.9)
POCT GLUCOSE: 89 MG/DL (ref 70–110)
POTASSIUM SERPL-SCNC: 4 MMOL/L (ref 3.5–5.1)
PROT SERPL-MCNC: 6.4 G/DL (ref 6–8.4)
PROT UR QL STRIP: ABNORMAL
RBC # BLD AUTO: 4.5 M/UL (ref 4–5.4)
RBC #/AREA URNS HPF: 1 /HPF (ref 0–4)
SODIUM SERPL-SCNC: 143 MMOL/L (ref 136–145)
SP GR UR STRIP: 1.02 (ref 1–1.03)
SQUAMOUS #/AREA URNS HPF: 7 /HPF
TROPONIN I SERPL DL<=0.01 NG/ML-MCNC: 0.01 NG/ML (ref 0–0.03)
URN SPEC COLLECT METH UR: ABNORMAL
UROBILINOGEN UR STRIP-ACNC: NEGATIVE EU/DL
WBC # BLD AUTO: 7.48 K/UL (ref 3.9–12.7)
WBC #/AREA URNS HPF: 7 /HPF (ref 0–5)

## 2024-07-05 PROCEDURE — 99285 EMERGENCY DEPT VISIT HI MDM: CPT | Mod: 25

## 2024-07-05 PROCEDURE — 80053 COMPREHEN METABOLIC PANEL: CPT | Performed by: NURSE PRACTITIONER

## 2024-07-05 PROCEDURE — 86803 HEPATITIS C AB TEST: CPT | Performed by: NURSE PRACTITIONER

## 2024-07-05 PROCEDURE — 87389 HIV-1 AG W/HIV-1&-2 AB AG IA: CPT | Performed by: NURSE PRACTITIONER

## 2024-07-05 PROCEDURE — 93005 ELECTROCARDIOGRAM TRACING: CPT

## 2024-07-05 PROCEDURE — 85025 COMPLETE CBC W/AUTO DIFF WBC: CPT | Performed by: NURSE PRACTITIONER

## 2024-07-05 PROCEDURE — 84484 ASSAY OF TROPONIN QUANT: CPT | Performed by: NURSE PRACTITIONER

## 2024-07-05 PROCEDURE — 96374 THER/PROPH/DIAG INJ IV PUSH: CPT

## 2024-07-05 PROCEDURE — 93010 ELECTROCARDIOGRAM REPORT: CPT | Mod: ,,, | Performed by: INTERNAL MEDICINE

## 2024-07-05 PROCEDURE — 63600175 PHARM REV CODE 636 W HCPCS: Performed by: NURSE PRACTITIONER

## 2024-07-05 PROCEDURE — 83880 ASSAY OF NATRIURETIC PEPTIDE: CPT | Performed by: NURSE PRACTITIONER

## 2024-07-05 PROCEDURE — 81000 URINALYSIS NONAUTO W/SCOPE: CPT | Performed by: NURSE PRACTITIONER

## 2024-07-05 PROCEDURE — 82962 GLUCOSE BLOOD TEST: CPT

## 2024-07-05 RX ORDER — PREDNISONE 20 MG/1
40 TABLET ORAL DAILY
Qty: 10 TABLET | Refills: 0 | Status: SHIPPED | OUTPATIENT
Start: 2024-07-05 | End: 2024-07-10

## 2024-07-05 RX ORDER — NITROFURANTOIN 25; 75 MG/1; MG/1
100 CAPSULE ORAL 2 TIMES DAILY
Qty: 10 CAPSULE | Refills: 0 | Status: SHIPPED | OUTPATIENT
Start: 2024-07-05 | End: 2024-07-10

## 2024-07-05 RX ORDER — DEXAMETHASONE SODIUM PHOSPHATE 4 MG/ML
10 INJECTION, SOLUTION INTRA-ARTICULAR; INTRALESIONAL; INTRAMUSCULAR; INTRAVENOUS; SOFT TISSUE
Status: COMPLETED | OUTPATIENT
Start: 2024-07-05 | End: 2024-07-05

## 2024-07-05 RX ADMIN — DEXAMETHASONE SODIUM PHOSPHATE 10 MG: 4 INJECTION INTRA-ARTICULAR; INTRALESIONAL; INTRAMUSCULAR; INTRAVENOUS; SOFT TISSUE at 12:07

## 2024-07-05 NOTE — ED TRIAGE NOTES
Pt presents to Ed today c/o bilateral shoulder pain, bilateral knee pain, and urinary frequency x 3 weeks since moving from [place]   Pt reports she was due to have knee replacement prior to move

## 2024-07-05 NOTE — ED PROVIDER NOTES
Source of History:  Patient    Chief complaint:  multiple compliants  (Reports bilat shoulder pain that radiates to bilateral ankle. Hx of arthritis. Pt also endorses frequent urination x2 weeks. +bilat knee swelling  )      HPI:  Sandra Lui is a 74 y.o. female presenting with complaint of bilateral shoulder pain and bilateral hip pain that began a few weeks ago, patient reports she has a history of polymyalgia for which she follows up with pain management.  She also endorses having some mild shortness of breath and chest pain that has been ongoing for the past 2 weeks.  Past medical history includes CAD, TIA, chronic pain, COPD.  Patient recently moved here from [place] and does not have a primary care provider yet.  She also endorses having urinary frequency for the last 2 weeks with some lower abdominal pressure but denies any dysuria or any flank pain.  She denies any fever/chills.    This is the extent to the patients complaints today here in the emergency department.    PMH:  As per HPI and below:  Past Medical History:   Diagnosis Date    Gout     Hypertension      Past Surgical History:   Procedure Laterality Date    ANKLE SURGERY      CHOLECYSTECTOMY      HYSTERECTOMY      TONSILLECTOMY      TUBAL LIGATION         Social History     Tobacco Use    Smoking status: Never   Substance Use Topics    Alcohol use: No    Drug use: No       Review of patient's allergies indicates:   Allergen Reactions    Adhesive     Erythromycin     Neosporin (neomycin-polymyx) Rash       ROS: As per HPI and below:  Constitutional: No fever.  No chills.  Eyes: No visual changes.  ENT: No sore throat. No ear pain.  Cardiovascular:  Chest pain  Respiratory:  Shortness of breath  GI: No abdominal pain.  No nausea or vomiting.  Genitourinary: No abnormal urination.  Neurologic: No headache. No focal weakness.  No numbness.  MSK: no back pain.  Multiple joint pain  Integument: No rashes or lesions.  Hematologic: No easy  "bruising.  Endocrine: No excessive thirst or urination.    Physical Exam:    BP (!) 142/74 (BP Location: Left arm, Patient Position: Sitting)   Pulse 65   Temp 98.2 °F (36.8 °C) (Oral)   Resp 19   Ht 5' 7" (1.702 m)   Wt 108.9 kg (240 lb)   SpO2 99%   BMI 37.59 kg/m²   Vitals:    07/05/24 0852 07/05/24 1303   BP: (!) 187/83 (!) 142/74   Pulse: (!) 59 65   Resp: 20 19   Temp: 97.5 °F (36.4 °C) 98.2 °F (36.8 °C)   TempSrc: Oral Oral   SpO2: 96% 99%   Weight: 108.9 kg (240 lb)    Height: 5' 7" (1.702 m)        Nursing note and vital signs reviewed.  Constitutional: No acute distress.  Well-appearing, non-toxic.  Eyes: No conjunctival injection.  Extraocular muscles are intact.  ENT: Oropharynx clear.  Mucous membranes are pink and moist.  Cardiovascular:  Regular rate and rhythm no murmurs gallops or rubs.  Chest/ Respiratory:  Clear to auscultation bilaterally without wheezing rhonchi or rales.  No chest wall tenderness, crepitus, bruising.    Abdomen: Soft.  Not distended.  Nontender.  No guarding.  No rebound. Non-peritoneal.  No CVA tenderness  Musculoskeletal: Good range of motion all joints.  No deformities.  Neck supple.  No meningismus.  No point tenderness over bilateral hips.  Skin: No rashes seen.  Good turgor.  No abrasions.  No ecchymoses.  Neuro: alert and oriented x3,  no focal neurological deficits.  Psych: Appropriate, conversant    Labs that have been ordered have been independently reviewed and interpreted by myself.    Labs Reviewed   URINALYSIS, REFLEX TO URINE CULTURE - Abnormal; Notable for the following components:       Result Value    Protein, UA Trace (*)     Nitrite, UA Positive (*)     Leukocytes, UA 1+ (*)     All other components within normal limits    Narrative:     Specimen Source->Urine   URINALYSIS MICROSCOPIC - Abnormal; Notable for the following components:    WBC, UA 7 (*)     All other components within normal limits    Narrative:     Specimen Source->Urine   COMPREHENSIVE " METABOLIC PANEL - Abnormal; Notable for the following components:    Chloride 113 (*)     CO2 21 (*)     eGFR 47 (*)     All other components within normal limits    Narrative:     Release to patient->Immediate   CBC W/ AUTO DIFFERENTIAL    Narrative:     Release to patient->Immediate   TROPONIN I    Narrative:     Release to patient->Immediate   B-TYPE NATRIURETIC PEPTIDE    Narrative:     Release to patient->Immediate   HIV 1 / 2 ANTIBODY    Narrative:     Release to patient->Immediate   HEPATITIS C ANTIBODY    Narrative:     Release to patient->Immediate   POCT GLUCOSE       X-Ray Chest AP Portable   Final Result      No acute cardiopulmonary process seen.         Electronically signed by: Agustina Montes   Date:    07/05/2024   Time:    11:55          No results found for this or any previous visit.    Differential Diagnosis:  Differential Diagnosis includes, but is not limited to:  ACS/MI, PE, aortic dissection, pneumothorax, cardiac tamponade, pericarditis/myocarditis, pneumonia, infection/abscess, lung mass, costochondritis/pleurisy, GERD, biliary disease, pancreatitis    MDM  Medical Decision Making  After complete evaluation, including thorough history and physical exam, I do not believe the patient has ACS or any major cardiac condition at this time. The patient's symptoms are most consistent with atypical chest pain, as the patient's EKG revealed no evidence of acute ischemia, troponin was within acceptable limits. HEART score was calculated to be 3, low risk for cardiac origin. The patient was treated with supportive care and improved.  UA with evidence of infection will discharge patient home with oral antibiotics.  I feel the patient is stable for D/C from the ED with outpatient risk stratification with their cardiologist.  Patient recently moved here from [place], internal Medicine, Orthopedic, Cardiology, pulmonology and pain management referrals were placed for the patient.      Amount and/or  Complexity of Data Reviewed  Labs: ordered. Decision-making details documented in ED Course.  Radiology: ordered.    Risk  Prescription drug management.        ED Course as of 07/05/24 1913 Fri Jul 05, 2024   1056 Leukocyte Esterase, UA(!): 1+ [AS]   1056 NITRITE UA(!): Positive [AS]   1056 WBC, UA(!): 7 [AS]   1137 WBC: 7.48 [AS]   1137 Hemoglobin: 13.6 [AS]   1137 Hematocrit: 41.7 [AS]   1138 Platelet Count: 193 [AS]   1138 POCT Glucose: 89 [AS]   1205 BNP: 84 [AS]   1205 Troponin I: 0.008 [AS]   1234 Sodium: 143 [AS]   1234 Glucose: 95 [AS]   1234 BUN: 13 [AS]   1234 Creatinine: 1.2 [AS]   1234 eGFR(!): 47 [AS]   1234 Anion Gap: 9 [AS]      ED Course User Index  [AS] Kimberley Sweet FNP       Diagnostic Impression:    1. Acute cystitis with hematuria    2. Polymyalgia    3. Chronic obstructive pulmonary disease, unspecified COPD type    4. Hx-TIA (transient ischemic attack)    5. Coronary artery disease, unspecified vessel or lesion type, unspecified whether angina present, unspecified whether native or transplanted heart    6. Chronic pain of both knees         ED Disposition Condition    Discharge Stable            ED Prescriptions       Medication Sig Dispense Start Date End Date Auth. Provider    predniSONE (DELTASONE) 20 MG tablet Take 2 tablets (40 mg total) by mouth once daily. for 5 days 10 tablet 7/5/2024 7/10/2024 Kimberley Sweet FNP    nitrofurantoin, macrocrystal-monohydrate, (MACROBID) 100 MG capsule Take 1 capsule (100 mg total) by mouth 2 (two) times daily. for 5 days 10 capsule 7/5/2024 7/10/2024 Kimberley Sweet FNP          Follow-up Information       Follow up With Specialties Details Why Contact Info    [facility] Emergency Dept Emergency Medicine Go to  If symptoms worsen [id number] [id number] [phone]             Kimberley Sweet FNP  07/05/24 1913

## 2024-07-05 NOTE — FIRST PROVIDER EVALUATION
Emergency Department TeleTriage Encounter Note      CHIEF COMPLAINT    Chief Complaint   Patient presents with    multiple compliants      Reports bilat shoulder pain that radiates to bilateral ankle. Hx of arthritis. Pt also endorses frequent urination x2 weeks. +bilat knee swelling         VITAL SIGNS   Initial Vitals [07/05/24 0852]   BP Pulse Resp Temp SpO2   (!) 187/83 (!) 59 20 97.5 °F (36.4 °C) 96 %      MAP       --            ALLERGIES    Review of patient's allergies indicates:   Allergen Reactions    Adhesive     Erythromycin     Neosporin (neomycin-polymyx) Rash       PROVIDER TRIAGE NOTE  This is a teletriage evaluation of a 74 y.o. female presenting to the ED with c/o bilateral shoulder pain to toes pain.  Pt states increased urination for the past 3 weeks.  Pt has been taking her tramadol with no relief.       PE: VSS.  NAD noted.     Plan: urine.      Limited physical exam via telehealth: The patient is awake, alert, answering questions appropriately and is not in respiratory distress. All ED beds are full at present; patient notified of this status.  Patient seen and medically screened by Nurse Practitioner via teletriage.      Initial orders will be placed and care will be transferred to an alternate provider when patient is roomed for a full evaluation. Any additional orders and the final disposition will be determined by that provider.         ORDERS  Labs Reviewed   URINALYSIS, REFLEX TO URINE CULTURE   POCT GLUCOSE       ED Orders (720h ago, onward)      Start Ordered     Status Ordering Provider    07/05/24 0921 07/05/24 0920  Urinalysis, Reflex to Urine Culture Urine, Clean Catch  STAT         Ordered ALEXIS FAJARDO    07/05/24 0854 07/05/24 0854  POCT glucose  Once         Final result EMERGENCY, DEPT PHYSICIAN              Virtual Visit Note: The provider triage portion of this emergency department evaluation and documentation was performed via [employer], a HIPAA-compliant  telemedicine application, in concert with a tele-presenter in the room. A face to face patient evaluation with one of my colleagues will occur once the patient is placed in an emergency department room.      DISCLAIMER: This note was prepared with [employer] voice recognition transcription software. Garbled syntax, mangled pronouns, and other bizarre constructions may be attributed to that software system.

## 2024-07-08 ENCOUNTER — TELEPHONE (OUTPATIENT)
Dept: ADMINISTRATIVE | Facility: OTHER | Age: 74
End: 2024-07-08
Payer: MEDICARE

## 2024-07-08 LAB
OHS QRS DURATION: 94 MS
OHS QTC CALCULATION: 451 MS

## 2024-07-08 NOTE — TELEPHONE ENCOUNTER
Unable to reach out to patient by phone as her number is disconnected to advise her to reach out to her insurance company for covering provider for Cardiology.

## 2024-07-08 NOTE — TELEPHONE ENCOUNTER
Unable to reach out to patient by phone as her number is disconnected to advise her to reach out to her insurance company for covering provider for Neurology.

## 2024-07-22 ENCOUNTER — TELEPHONE (OUTPATIENT)
Dept: ORTHOPEDICS | Facility: CLINIC | Age: 74
End: 2024-07-22
Payer: MEDICARE

## 2024-07-22 NOTE — TELEPHONE ENCOUNTER
The patients insurance has no benefits for services at this location. The patient will have to pay out of pocket for this visit. The patient can contact their insurance carrier to locate providers in their network. By proceeding I acknowledge that I have shared this information with the patient. Pt verbalized understanding   ----- Message from Marybeth Aguirre sent at 7/22/2024 10:36 AM CDT -----  Regarding: appt req  Contact: Pt  Type: Appointment Request    Caller is requesting an appointment for a new patient.    Name of Caller: pt  Reason for appointment: Severe knee pain  Would the patient rather a call back or a response via [facility]? Call back  Best Call Back Number: [phone]  Additional Information: Please call, Thank You

## 2024-07-23 DIAGNOSIS — M25.561 PAIN IN BOTH KNEES, UNSPECIFIED CHRONICITY: Primary | ICD-10-CM

## 2024-07-23 DIAGNOSIS — M25.562 PAIN IN BOTH KNEES, UNSPECIFIED CHRONICITY: Primary | ICD-10-CM

## 2024-07-26 ENCOUNTER — OFFICE VISIT (OUTPATIENT)
Dept: ORTHOPEDICS | Facility: CLINIC | Age: 74
End: 2024-07-26
Payer: MEDICARE

## 2024-07-26 ENCOUNTER — TELEPHONE (OUTPATIENT)
Dept: PULMONOLOGY | Facility: CLINIC | Age: 74
End: 2024-07-26
Payer: MEDICARE

## 2024-07-26 ENCOUNTER — HOSPITAL ENCOUNTER (OUTPATIENT)
Dept: RADIOLOGY | Facility: HOSPITAL | Age: 74
Discharge: HOME | End: 2024-07-26
Attending: ORTHOPAEDIC SURGERY
Payer: MEDICARE

## 2024-07-26 VITALS — BODY MASS INDEX: 42.06 KG/M2 | HEIGHT: 67 IN | WEIGHT: 268 LBS

## 2024-07-26 DIAGNOSIS — J44.9 CHRONIC OBSTRUCTIVE PULMONARY DISEASE, UNSPECIFIED COPD TYPE: Primary | ICD-10-CM

## 2024-07-26 DIAGNOSIS — M25.562 PAIN IN BOTH KNEES, UNSPECIFIED CHRONICITY: ICD-10-CM

## 2024-07-26 DIAGNOSIS — M25.561 PAIN IN BOTH KNEES, UNSPECIFIED CHRONICITY: ICD-10-CM

## 2024-07-26 DIAGNOSIS — G89.29 CHRONIC PAIN OF BOTH KNEES: ICD-10-CM

## 2024-07-26 DIAGNOSIS — M25.562 CHRONIC PAIN OF BOTH KNEES: ICD-10-CM

## 2024-07-26 DIAGNOSIS — M25.561 CHRONIC PAIN OF BOTH KNEES: ICD-10-CM

## 2024-07-26 DIAGNOSIS — M17.11 PRIMARY OSTEOARTHRITIS OF RIGHT KNEE: Primary | ICD-10-CM

## 2024-07-26 PROCEDURE — 1159F MED LIST DOCD IN RCRD: CPT | Mod: CPTII,S$GLB,, | Performed by: ORTHOPAEDIC SURGERY

## 2024-07-26 PROCEDURE — 73564 X-RAY EXAM KNEE 4 OR MORE: CPT | Mod: 26,50,, | Performed by: RADIOLOGY

## 2024-07-26 PROCEDURE — 73564 X-RAY EXAM KNEE 4 OR MORE: CPT | Mod: TC,50

## 2024-07-26 PROCEDURE — 99203 OFFICE O/P NEW LOW 30 MIN: CPT | Mod: S$GLB,,, | Performed by: ORTHOPAEDIC SURGERY

## 2024-07-26 PROCEDURE — 1125F AMNT PAIN NOTED PAIN PRSNT: CPT | Mod: CPTII,S$GLB,, | Performed by: ORTHOPAEDIC SURGERY

## 2024-07-26 PROCEDURE — 99999 PR PBB SHADOW E&M-EST. PATIENT-LVL III: CPT | Mod: PBBFAC,,, | Performed by: ORTHOPAEDIC SURGERY

## 2024-07-26 PROCEDURE — 1160F RVW MEDS BY RX/DR IN RCRD: CPT | Mod: CPTII,S$GLB,, | Performed by: ORTHOPAEDIC SURGERY

## 2024-07-26 RX ORDER — CHOLECALCIFEROL (VITAMIN D3) 25 MCG
1000 TABLET ORAL DAILY
COMMUNITY

## 2024-07-26 NOTE — PROGRESS NOTES
Subjective     Patient ID: Sandra Lui is a 74 y.o. female.    Chief Complaint: Pain of the Left Knee and Pain of the Right Knee    HPI:  Sandra Lui is a 74 y.o. female here with a multiple year history of bilateral knee pain, R>L. The patient is a  retiree. There was not a history of trauma.  The pain is severe The pain is located in the medial aspect of the knee. There is is radiation to the lower leg.  The pain is described as achy. The patient has not had prior surgery. It is aggravated by standing, walking, and with moderate activity.  I tis not alleviated by rest. There is not numbness or tingling of the lower extremity.  There is not back pain.  She  has tried medications or injections. They have not helped.  She does have difficulty getting in or out of a car, getting dressed, or going up or down stairs.  The patient does use an assistive device.    Was planning on having R TKA in April 2024 in [place], but she moved to [facility] with her daughter. She is seeking to establish care with an arthroplasty surgeon in [place].     History of TIA (x2) for which she takes daily aspirin + plavix  Endorses chronic kidney disease (previously told not to take NSAIDs)    Denies DM, cardiac history, blood clots.    Past Medical History:   Diagnosis Date    Gout     Hypertension      Past Surgical History:   Procedure Laterality Date    ANKLE SURGERY      CHOLECYSTECTOMY      HYSTERECTOMY      TONSILLECTOMY      TUBAL LIGATION           Review of Systems   Constitutional: Negative for chills and fever.   HENT:  Negative for congestion.    Cardiovascular:  Negative for chest pain and palpitations.   Respiratory:  Negative for cough and shortness of breath.    Endocrine: Negative for cold intolerance and heat intolerance.   Hematologic/Lymphatic: Does not bruise/bleed easily.   Skin:  Negative for color change and rash.   Musculoskeletal:  Positive for arthritis, joint pain, joint swelling and stiffness. Negative for  back pain and gout.   Gastrointestinal:  Negative for abdominal pain.   Genitourinary:  Negative for bladder incontinence.   Neurological:  Negative for dizziness and focal weakness.   Allergic/Immunologic: Negative for persistent infections.            Objective     General    Constitutional: She is oriented to person, place, and time. She appears well-developed and well-nourished.   HENT:   Head: Normocephalic and atraumatic.   Eyes: EOM are normal.   Cardiovascular:  Intact distal pulses.            Pulmonary/Chest: Effort normal.   Neurological: She is alert and oriented to person, place, and time.   Psychiatric: She has a normal mood and affect. Her behavior is normal.           Right Knee Exam     Inspection   Scars: absent  Swelling: absent    Range of Motion   The patient has normal right knee ROM.  Extension:  0   Flexion:  140     Tests   Ligament Examination   MCL - Valgus: normal (0 to 2mm)  LCL - Varus: normal  Patella   Patellar Tracking: normal    Other   Sensation: normal    Left Knee Exam     Range of Motion   Extension:  0   Flexion:  140     Tests   Stability   Lachman: normal (-1 to 2mm)   MCL - Valgus: normal (0 to 2mm)  LCL - Varus: normal (0 to 2mm)  Patella   Patellar Tracking: normal  Patellar Grind: positive    Other   Sensation: normal    Right Hip Exam   Right hip exam is normal.     Range of Motion   The patient has normal right hip ROM.    Tests   Stinchfield test: negative  Log Roll: negative  Left Hip Exam   Left hip exam is normal.    Range of Motion   The patient has normal left hip ROM.    Tests   Stinchfield test: negative  Log Roll: negative          Muscle Strength   Left Lower Extremity   Quadriceps:  5/5   Hamstrin/5     Vascular Exam     Right Pulses  Dorsalis Pedis:      2+  Posterior Tibial:      2+        Left Pulses  Dorsalis Pedis:      2+  Posterior Tibial:      2+        Edema  Right Lower Leg: absent  Left Lower Leg: absent      Physical Exam  Constitutional:      "  Appearance: She is well-developed and well-nourished.   HENT:      Head: Normocephalic and atraumatic.   Eyes:      Extraocular Movements: EOM normal.   Cardiovascular:      Pulses: Intact distal pulses.           Dorsalis pedis pulses are 2+ on the right side and 2+ on the left side.        Posterior tibial pulses are 2+ on the right side and 2+ on the left side.   Pulmonary:      Effort: Pulmonary effort is normal.   Musculoskeletal:      Right knee: No swelling.      Right lower leg: No edema.      Left lower leg: No edema.   Neurological:      Mental Status: She is alert and oriented to person, place, and time.   Psychiatric:         Mood and Affect: Mood and affect normal.         Behavior: Behavior normal.      Radiographs taken today and reviewed by me demonstrate severe arthritic change of the right KNEE(s).There  is not bone destruction.  There is not a fracture. The medial compartment is most involved.  There is a mild varus deformity.  The changes are tricompartmental. Bone on bone arthritis in right knee medial compartment.    L knee mild-moderate DJD, most significant in medial compartment.         Assessment and Plan     Encounter Diagnoses   Name Primary?    Chronic pain of both knees     Primary osteoarthritis of right knee Yes         Sandra LEELA Polk" was seen today for pain and pain.    Diagnoses and all orders for this visit:    Primary osteoarthritis of right knee  -     Ambulatory referral/consult to Orthopedics; Future    Chronic pain of both knees  -     Ambulatory referral/consult to Orthopedics      Patient presenting with osteoarthritis of the right knee, which is significantly affecting her ADL's. She was scheduled for R TKA in [place] prior to relocating. We again discussed her pathophysiology in detail with reference to x-rays, models, other visual aids as appropriate.  Treatment options were discussed in detail.  Questions were invited and answered to the patient's satisfaction.    Will " refer to arthroplasty partner for discussion of R TKA.

## 2024-07-29 PROBLEM — M17.11 PRIMARY OSTEOARTHRITIS OF RIGHT KNEE: Status: ACTIVE | Noted: 2024-07-29

## 2024-08-19 ENCOUNTER — OFFICE VISIT (OUTPATIENT)
Dept: INTERNAL MEDICINE | Facility: CLINIC | Age: 74
End: 2024-08-19
Payer: MEDICARE

## 2024-08-19 ENCOUNTER — LAB VISIT (OUTPATIENT)
Dept: LAB | Facility: HOSPITAL | Age: 74
End: 2024-08-19
Payer: MEDICARE

## 2024-08-19 VITALS
DIASTOLIC BLOOD PRESSURE: 77 MMHG | BODY MASS INDEX: 42.35 KG/M2 | WEIGHT: 269.81 LBS | HEIGHT: 67 IN | SYSTOLIC BLOOD PRESSURE: 146 MMHG | HEART RATE: 67 BPM

## 2024-08-19 DIAGNOSIS — R73.03 PRE-DIABETES: ICD-10-CM

## 2024-08-19 DIAGNOSIS — G47.33 OSA (OBSTRUCTIVE SLEEP APNEA): ICD-10-CM

## 2024-08-19 DIAGNOSIS — G45.9 TIA (TRANSIENT ISCHEMIC ATTACK): ICD-10-CM

## 2024-08-19 DIAGNOSIS — R35.0 URINARY FREQUENCY: ICD-10-CM

## 2024-08-19 DIAGNOSIS — K21.9 GASTROESOPHAGEAL REFLUX DISEASE, UNSPECIFIED WHETHER ESOPHAGITIS PRESENT: ICD-10-CM

## 2024-08-19 DIAGNOSIS — J45.909 ASTHMA, UNSPECIFIED ASTHMA SEVERITY, UNSPECIFIED WHETHER COMPLICATED, UNSPECIFIED WHETHER PERSISTENT: ICD-10-CM

## 2024-08-19 DIAGNOSIS — I65.23 BILATERAL CAROTID ARTERY STENOSIS: ICD-10-CM

## 2024-08-19 DIAGNOSIS — J44.9 CHRONIC OBSTRUCTIVE PULMONARY DISEASE, UNSPECIFIED COPD TYPE: ICD-10-CM

## 2024-08-19 DIAGNOSIS — M06.9 RHEUMATOID ARTHRITIS, INVOLVING UNSPECIFIED SITE, UNSPECIFIED WHETHER RHEUMATOID FACTOR PRESENT: ICD-10-CM

## 2024-08-19 DIAGNOSIS — M35.3 POLYMYALGIA RHEUMATICA: ICD-10-CM

## 2024-08-19 DIAGNOSIS — Z76.89 ENCOUNTER TO ESTABLISH CARE: ICD-10-CM

## 2024-08-19 DIAGNOSIS — I10 HYPERTENSION, UNSPECIFIED TYPE: ICD-10-CM

## 2024-08-19 DIAGNOSIS — M35.3 POLYMYALGIA RHEUMATICA: Primary | ICD-10-CM

## 2024-08-19 DIAGNOSIS — M35.3 POLYMYALGIA: ICD-10-CM

## 2024-08-19 LAB
ALBUMIN SERPL BCP-MCNC: 3.6 G/DL (ref 3.5–5.2)
ALP SERPL-CCNC: 85 U/L (ref 55–135)
ALT SERPL W/O P-5'-P-CCNC: 8 U/L (ref 10–44)
ANION GAP SERPL CALC-SCNC: 8 MMOL/L (ref 8–16)
AST SERPL-CCNC: 15 U/L (ref 10–40)
BASOPHILS # BLD AUTO: 0.04 K/UL (ref 0–0.2)
BASOPHILS NFR BLD: 0.5 % (ref 0–1.9)
BILIRUB SERPL-MCNC: 0.6 MG/DL (ref 0.1–1)
BUN SERPL-MCNC: 10 MG/DL (ref 8–23)
CALCIUM SERPL-MCNC: 10.1 MG/DL (ref 8.7–10.5)
CHLORIDE SERPL-SCNC: 110 MMOL/L (ref 95–110)
CHOLEST SERPL-MCNC: 128 MG/DL (ref 120–199)
CHOLEST/HDLC SERPL: 2.7 {RATIO} (ref 2–5)
CO2 SERPL-SCNC: 26 MMOL/L (ref 23–29)
CREAT SERPL-MCNC: 1.1 MG/DL (ref 0.5–1.4)
DIFFERENTIAL METHOD BLD: ABNORMAL
EOSINOPHIL # BLD AUTO: 0.1 K/UL (ref 0–0.5)
EOSINOPHIL NFR BLD: 0.7 % (ref 0–8)
ERYTHROCYTE [DISTWIDTH] IN BLOOD BY AUTOMATED COUNT: 13.5 % (ref 11.5–14.5)
EST. GFR  (NO RACE VARIABLE): 52.1 ML/MIN/1.73 M^2
ESTIMATED AVG GLUCOSE: 117 MG/DL (ref 68–131)
GLUCOSE SERPL-MCNC: 94 MG/DL (ref 70–110)
HBA1C MFR BLD: 5.7 % (ref 4–5.6)
HCT VFR BLD AUTO: 46.1 % (ref 37–48.5)
HDLC SERPL-MCNC: 47 MG/DL (ref 40–75)
HDLC SERPL: 36.7 % (ref 20–50)
HGB BLD-MCNC: 14.1 G/DL (ref 12–16)
IMM GRANULOCYTES # BLD AUTO: 0.01 K/UL (ref 0–0.04)
IMM GRANULOCYTES NFR BLD AUTO: 0.1 % (ref 0–0.5)
LDLC SERPL CALC-MCNC: 58.8 MG/DL (ref 63–159)
LYMPHOCYTES # BLD AUTO: 2.7 K/UL (ref 1–4.8)
LYMPHOCYTES NFR BLD: 34.9 % (ref 18–48)
MCH RBC QN AUTO: 30.1 PG (ref 27–31)
MCHC RBC AUTO-ENTMCNC: 30.6 G/DL (ref 32–36)
MCV RBC AUTO: 98 FL (ref 82–98)
MONOCYTES # BLD AUTO: 0.7 K/UL (ref 0.3–1)
MONOCYTES NFR BLD: 8.5 % (ref 4–15)
NEUTROPHILS # BLD AUTO: 4.2 K/UL (ref 1.8–7.7)
NEUTROPHILS NFR BLD: 55.3 % (ref 38–73)
NONHDLC SERPL-MCNC: 81 MG/DL
NRBC BLD-RTO: 0 /100 WBC
PLATELET # BLD AUTO: 203 K/UL (ref 150–450)
PMV BLD AUTO: 11.1 FL (ref 9.2–12.9)
POTASSIUM SERPL-SCNC: 4.1 MMOL/L (ref 3.5–5.1)
PROT SERPL-MCNC: 6.8 G/DL (ref 6–8.4)
RBC # BLD AUTO: 4.69 M/UL (ref 4–5.4)
SODIUM SERPL-SCNC: 144 MMOL/L (ref 136–145)
TRIGL SERPL-MCNC: 111 MG/DL (ref 30–150)
TSH SERPL DL<=0.005 MIU/L-ACNC: 1.89 UIU/ML (ref 0.4–4)
WBC # BLD AUTO: 7.62 K/UL (ref 3.9–12.7)

## 2024-08-19 PROCEDURE — 84443 ASSAY THYROID STIM HORMONE: CPT

## 2024-08-19 PROCEDURE — 36415 COLL VENOUS BLD VENIPUNCTURE: CPT

## 2024-08-19 PROCEDURE — 83036 HEMOGLOBIN GLYCOSYLATED A1C: CPT

## 2024-08-19 PROCEDURE — 80061 LIPID PANEL: CPT

## 2024-08-19 PROCEDURE — 99999 PR PBB SHADOW E&M-EST. PATIENT-LVL V: CPT | Mod: PBBFAC,GC,,

## 2024-08-19 PROCEDURE — 85025 COMPLETE CBC W/AUTO DIFF WBC: CPT

## 2024-08-19 PROCEDURE — 80053 COMPREHEN METABOLIC PANEL: CPT

## 2024-08-19 PROCEDURE — 81003 URINALYSIS AUTO W/O SCOPE: CPT

## 2024-08-19 RX ORDER — CLOPIDOGREL BISULFATE 75 MG/1
75 TABLET ORAL DAILY
COMMUNITY

## 2024-08-19 RX ORDER — CHOLECALCIFEROL (VITAMIN D3) 25 MCG
1000 TABLET ORAL DAILY
COMMUNITY

## 2024-08-19 RX ORDER — ROSUVASTATIN CALCIUM 20 MG/1
20 TABLET, COATED ORAL NIGHTLY
COMMUNITY

## 2024-08-19 RX ORDER — CALCIUM CARBONATE 600 MG
1200 TABLET ORAL ONCE
COMMUNITY

## 2024-08-19 RX ORDER — COLCHICINE 0.6 MG/1
0.6 TABLET ORAL DAILY PRN
COMMUNITY

## 2024-08-19 RX ORDER — FAMOTIDINE 40 MG/1
40 TABLET, FILM COATED ORAL DAILY
COMMUNITY

## 2024-08-19 RX ORDER — METOPROLOL SUCCINATE 25 MG/1
25 TABLET, EXTENDED RELEASE ORAL DAILY
COMMUNITY

## 2024-08-19 RX ORDER — ACETAMINOPHEN 500 MG
500 TABLET ORAL DAILY
COMMUNITY

## 2024-08-19 RX ORDER — FUROSEMIDE 20 MG/1
20 TABLET ORAL 2 TIMES DAILY
COMMUNITY

## 2024-08-19 RX ORDER — GABAPENTIN 300 MG/1
300 CAPSULE ORAL 2 TIMES DAILY
COMMUNITY

## 2024-08-19 NOTE — PROGRESS NOTES
Subjective     Chief Complaint:    History of Present Illness:  Ms. Sandra Lui is a 74 y.o. female who recently moved back to [place] from [place], who is a known case of RUSH on CPAP at night, TIA's x2 (last one was 1.5 years ago) on aspirin, carotid stenosis bilaterally on plavix (used to follow with vascular in [place]- not amneable to surgery at the time being), HTN, bronchial asthma, back pain and disc prolapse, biltateral knee pain (will undergo replacement with Dr. Delgado) who is here to establish care as well as have concerns of her recent urinary frequency that occurred last night. She also said her PMR flared last night. She mentioned during the encounter that she had multiple doctors from different specialties seeing her in [place] and that [place] [facility] [place]    She started having increased urinary frequency since last night in the absence of fever, CVA angle tenderness, or dysuria or hematuria. She said that she came to  [facility] ER and had the same manifestation. She was late discharged on antibiotics, for 7 days, she completed the entire course at home.    She also complained of diarrhea since last night.. She says she passed stool 4 times since last night- that were not watery but rather soft. No fever. Mild abdominal pain.    She has been feeling like her PMR is flaring lately, by which she means that she is experiencing proximal muscle weakness and unable to lift her arms above her head. She stressed on the fact that she needs to followup with a rheumatologist here regarding her PMR and RA. The latter she says she was diagnosed by but never treated because she changed her insurance and her rheumatologist at that time did not accept her new insurance.    She did not report any chest pain, seizures, vomiting,    Baseline at home: walks around with a cane. Lives with her daughter. Marital status: single. Smoking: never. Alcohol: socially (once a year). Retired nurse.        PAST HISTORY:     Past Medical History:   Diagnosis Date    Gout     Hypertension        Past Surgical History:   Procedure Laterality Date    ANKLE SURGERY      CHOLECYSTECTOMY      HYSTERECTOMY      TONSILLECTOMY      TUBAL LIGATION         No family history on file.    Social History     Socioeconomic History    Marital status: Single   Tobacco Use    Smoking status: Never   Substance and Sexual Activity    Alcohol use: No    Drug use: No     Social Determinants of Health     Financial Resource Strain: Medium Risk (8/16/2024)    Overall Financial Resource Strain (CARDIA)     Difficulty of Paying Living Expenses: Somewhat hard   Food Insecurity: Food Insecurity Present (8/16/2024)    Hunger Vital Sign     Worried About Running Out of Food in the Last Year: Sometimes true     Ran Out of Food in the Last Year: Sometimes true   Physical Activity: Inactive (8/16/2024)    Exercise Vital Sign     Days of Exercise per Week: 0 days     Minutes of Exercise per Session: 10 min   Stress: No Stress Concern Present (8/16/2024)    [employer] - Occupational Stress Questionnaire     Feeling of Stress : Only a little   Housing Stability: Unknown (8/16/2024)    Housing Stability Vital Sign     Unable to Pay for Housing in the Last Year: No       MEDICATIONS & ALLERGIES:     Current Outpatient Medications on File Prior to Visit   Medication Sig    amoxicillin-clavulanate 875-125mg (AUGMENTIN) 875-125 mg per tablet Take 1 tablet by mouth 2 (two) times daily.    aspirin (ECOTRIN) 81 MG EC tablet Take 81 mg by mouth once daily.    diazepam (VALIUM) 5 MG tablet Take 1 tablet (5 mg total) by mouth every 6 (six) hours as needed for Anxiety.    gabapentin (NEURONTIN) 300 MG capsule Take 300 mg by mouth 2 (two) times daily.    HYDROcodone-acetaminophen (NORCO) 5-325 mg per tablet Take 1 tablet by mouth every 4 (four) hours as needed for Pain.    losartan (COZAAR) 50 MG tablet Take 50 mg by mouth once  "daily.    metoprolol succinate (TOPROL-XL) 50 MG 24 hr tablet Take 50 mg by mouth 2 (two) times daily.    multivitamin (THERAGRAN) per tablet Take 1 tablet by mouth once daily.    vitamin D (VITAMIN D3) 1000 units Tab Take 1,000 Units by mouth once daily.     No current facility-administered medications on file prior to visit.       Review of patient's allergies indicates:   Allergen Reactions    Adhesive     Erythromycin     Neosporin (neomycin-polymyx) Rash       OBJECTIVE:     Vital Signs:  Vitals:    08/19/24 1411   BP: (!) 146/77   BP Location: Right arm   Patient Position: Sitting   BP Method: Large (Manual)   Pulse: 67   Weight: 122.4 kg (269 lb 13.5 oz)   Height: 5' 7" (1.702 m)       Body mass index is 42.26 kg/m².     Physical Exam:  General:  Well developed, well nourished, no acute distress  Head: Normocephalic, atraumatic  Eyes: PERRL  CVS:  RRR, S1 and S2 normal, no murmurs, rubs, gallops, 2+ peripheral pulses  Resp:  Lungs clear to auscultation, no wheezes, rales, rhonchi, cough  GI:  Abdomen soft, non-tender, non-distended, normoactive bowel sounds  MSK:  No muscle atrophy, cyanosis, peripheral edema   Skin:  No rashes, ulcers, erythema (except from chronic venous stasis changes)  Neuro:  No focal deficits noted  Psych:  Appropriate mood and affect    Laboratory  Lab Results   Component Value Date    WBC 7.48 07/05/2024    HGB 13.6 07/05/2024    HCT 41.7 07/05/2024    MCV 93 07/05/2024     07/05/2024     @[id number](GLU,NA,K,Cl,CO2,BUN,Creatinine,Calcium,MG)@  Lab Results   Component Value Date    INR 1.0 06/19/2019     No results found for: "HGBA1C"  No results for input(s): "POCTGLUCOSE" in the last 72 hours.    Diagnostic Results:  Labs: Reviewed    ASSESSMENT & PLAN:   Ms. Sandra Lui is a 74 y.o. female who presents today with     Sandra SWENSON "Amanda" was seen today for establish care.    Diagnoses and all orders for this visit:    Polymyalgia rheumatica  -     Ambulatory " referral/consult to Rheumatology; Future  -     HEMOGLOBIN A1C; Future  -     CBC W/ AUTO DIFFERENTIAL; Future  -     COMPREHENSIVE METABOLIC PANEL; Future  -     TSH; Future    Encounter to establish care  -     HEMOGLOBIN A1C; Future  -     COMPREHENSIVE METABOLIC PANEL; Future  -     TSH; Future    Hypertension, unspecified type  -     HEMOGLOBIN A1C; Future  -     COMPREHENSIVE METABOLIC PANEL; Future  -     TSH; Future    TIA (transient ischemic attack)  -     Ambulatory referral/consult to Neurology; Future  -     HEMOGLOBIN A1C; Future  -     LIPID PANEL; Future  -     CBC W/ AUTO DIFFERENTIAL; Future  -     COMPREHENSIVE METABOLIC PANEL; Future  -     TSH; Future    RUSH (obstructive sleep apnea)  -     Ambulatory referral/consult to Pulmonology; Future  -     HEMOGLOBIN A1C; Future  -     COMPREHENSIVE METABOLIC PANEL; Future  -     TSH; Future    Asthma, unspecified asthma severity, unspecified whether complicated, unspecified whether persistent  -     Ambulatory referral/consult to Pulmonology; Future  -     HEMOGLOBIN A1C; Future  -     COMPREHENSIVE METABOLIC PANEL; Future  -     TSH; Future    Rheumatoid arthritis, involving unspecified site, unspecified whether rheumatoid factor present  -     Ambulatory referral/consult to Rheumatology; Future  -     HEMOGLOBIN A1C; Future  -     CBC W/ AUTO DIFFERENTIAL; Future  -     COMPREHENSIVE METABOLIC PANEL; Future  -     TSH; Future    Gastroesophageal reflux disease, unspecified whether esophagitis present  -     HEMOGLOBIN A1C; Future  -     COMPREHENSIVE METABOLIC PANEL; Future  -     TSH; Future    Bilateral carotid artery stenosis  -     Ambulatory referral/consult to Vascular Surgery; Future  -     HEMOGLOBIN A1C; Future  -     COMPREHENSIVE METABOLIC PANEL; Future  -     TSH; Future    Urinary frequency  -     HEMOGLOBIN A1C; Future  -     TSH; Future  -     Urinalysis, Reflex to Urine Culture Urine, Clean Catch    Pre-diabetes Screening  -      HEMOGLOBIN A1C; Future  -     TSH; Future    Polymyalgia  -     Ambulatory referral/consult to Internal Medicine    Chronic obstructive pulmonary disease, unspecified COPD type  -     Ambulatory referral/consult to Internal Medicine             RTC in 3 months    Case discussed with Dr Humberto Aponte MD  Internal Medicine PGY1  [facility]  [address], [place] [id number] [phone]

## 2024-08-20 LAB
BILIRUB UR QL STRIP: NEGATIVE
CLARITY UR REFRACT.AUTO: CLEAR
COLOR UR AUTO: YELLOW
GLUCOSE UR QL STRIP: NEGATIVE
HGB UR QL STRIP: NEGATIVE
KETONES UR QL STRIP: NEGATIVE
LEUKOCYTE ESTERASE UR QL STRIP: NEGATIVE
NITRITE UR QL STRIP: NEGATIVE
PH UR STRIP: 6 [PH] (ref 5–8)
PROT UR QL STRIP: NEGATIVE
SP GR UR STRIP: 1.02 (ref 1–1.03)
URN SPEC COLLECT METH UR: NORMAL

## 2024-08-21 ENCOUNTER — TELEPHONE (OUTPATIENT)
Dept: INTERNAL MEDICINE | Facility: CLINIC | Age: 74
End: 2024-08-21
Payer: MEDICARE

## 2024-08-22 NOTE — TELEPHONE ENCOUNTER
Called pt to notify of lab results. Pt was given opportunity for ask questions or raise any concerns. All questions were answered. Pt agreed with plan.

## 2024-08-22 NOTE — PROGRESS NOTES
I did not personally examine the patient. Patient's record reviewed and I agree with the resident's assessment and plan.

## 2024-08-28 ENCOUNTER — TELEPHONE (OUTPATIENT)
Dept: VASCULAR SURGERY | Facility: CLINIC | Age: 74
End: 2024-08-28
Payer: MEDICARE

## 2024-08-28 DIAGNOSIS — I65.23 BILATERAL CAROTID ARTERY STENOSIS: Primary | ICD-10-CM

## 2024-08-30 ENCOUNTER — HOSPITAL ENCOUNTER (OUTPATIENT)
Dept: VASCULAR SURGERY | Facility: CLINIC | Age: 74
Discharge: HOME | End: 2024-08-30
Attending: SURGERY
Payer: MEDICARE

## 2024-08-30 DIAGNOSIS — I65.21 STENOSIS OF RIGHT CAROTID ARTERY: Primary | ICD-10-CM

## 2024-08-30 DIAGNOSIS — I65.23 BILATERAL CAROTID ARTERY STENOSIS: ICD-10-CM

## 2024-08-30 PROCEDURE — 93880 EXTRACRANIAL BILAT STUDY: CPT | Mod: S$GLB,,, | Performed by: SURGERY

## 2024-09-03 ENCOUNTER — INITIAL CONSULT (OUTPATIENT)
Dept: VASCULAR SURGERY | Facility: CLINIC | Age: 74
End: 2024-09-03
Payer: MEDICARE

## 2024-09-03 VITALS
BODY MASS INDEX: 38.76 KG/M2 | HEART RATE: 75 BPM | WEIGHT: 246.94 LBS | SYSTOLIC BLOOD PRESSURE: 146 MMHG | DIASTOLIC BLOOD PRESSURE: 80 MMHG | TEMPERATURE: 98 F | HEIGHT: 67 IN

## 2024-09-03 DIAGNOSIS — G45.9 TIA (TRANSIENT ISCHEMIC ATTACK): Primary | ICD-10-CM

## 2024-09-03 DIAGNOSIS — I65.23 BILATERAL CAROTID ARTERY STENOSIS: ICD-10-CM

## 2024-09-03 PROCEDURE — 99204 OFFICE O/P NEW MOD 45 MIN: CPT | Mod: S$GLB,,, | Performed by: SURGERY

## 2024-09-03 PROCEDURE — 99999 PR PBB SHADOW E&M-EST. PATIENT-LVL IV: CPT | Mod: PBBFAC,,, | Performed by: SURGERY

## 2024-09-03 NOTE — PROGRESS NOTES
VASCULAR SURGERY SERVICE    REFERRING DOCTOR: Tisha Aponte MDRef Provider (PCP)     CHIEF COMPLAINT:  ? carotid stenosis    HISTORY OF PRESENT ILLNESS: Sandra Lui is a 74 y.o. female who has recently relocated from [place].  While there she reports 2 episodes of TIAs is manifested by a proximally 10 minutes of dysarthria 1 2022 and once in 2023.   She was begun on dual antiplatelet therapy and statin has had none since.  She was evidently told by a vascular specialist she had some carotid stenosis but not enough to intervene upon.    Care everywhere imaging from [place] in 2022 demonstrated a CTA which showed 0% stenosis for carotid arteries, as well as a carotid duplex at also showed no evidence of carotid disease.    She reports no history of atrial fibrillation.  She says she has had borderline diabetes but has never required any treatment for it    Past Medical History:   Diagnosis Date    Gout     Hypertension        Past Surgical History:   Procedure Laterality Date    ANKLE SURGERY      CHOLECYSTECTOMY      HYSTERECTOMY      TONSILLECTOMY      TUBAL LIGATION           Current Outpatient Medications:     acetaminophen (TYLENOL) 500 MG tablet, Take 500 mg by mouth once daily., Disp: , Rfl:     aspirin (ECOTRIN) 81 MG EC tablet, Take 81 mg by mouth once daily., Disp: , Rfl:     calcium carbonate (OS-NOBLE) 600 mg calcium (1,500 mg) Tab, Take 1,200 mg by mouth once., Disp: , Rfl:     clopidogreL (PLAVIX) 75 mg tablet, Take 75 mg by mouth once daily., Disp: , Rfl:     colchicine (COLCRYS) 0.6 mg tablet, Take 0.6 mg by mouth daily as needed., Disp: , Rfl:     famotidine (PEPCID) 40 MG tablet, Take 40 mg by mouth once daily., Disp: , Rfl:     furosemide (LASIX) 20 MG tablet, Take 20 mg by mouth 2 (two) times daily., Disp: , Rfl:     gabapentin (NEURONTIN) 300 MG capsule, Take 300 mg by mouth 2 (two) times daily., Disp: , Rfl:     losartan (COZAAR) 50 MG tablet, Take 50 mg by mouth once daily., Disp: , Rfl:  "    metoprolol succinate (TOPROL-XL) 25 MG 24 hr tablet, Take 25 mg by mouth once daily., Disp: , Rfl:     rosuvastatin (CRESTOR) 20 MG tablet, Take 20 mg by mouth every evening., Disp: , Rfl:     vitamin D (VITAMIN D3) 1000 units Tab, Take 1,000 Units by mouth once daily., Disp: , Rfl:     Review of patient's allergies indicates:   Allergen Reactions    Adhesive     Erythromycin     Neosporin (neomycin-polymyx) Rash       No family history on file.    Social History     Tobacco Use    Smoking status: Never   Substance Use Topics    Alcohol use: No    Drug use: No       REVIEW OF SYSTEMS:  General: No chills, fever, malaise, changes in weight  HEENT: No visual changes, difficulty hearing  Cardiovascular: No chest pain, palpitations, claudication  Pulmonary: No dyspnea, cough, wheezing  Gastrointestinal: No nausea, vomiting, diarrhea, constipation  Genitourinary: No dysuria, low urine output, hematuria  Endocrine: No polydipsia, polyphagia  Hematologic: No fatigue with exertion, pica, pallor  Musculoskeletal: No extremity or joint pain, no back pain, no difficulty with ambulation  Neurologic: no seizures, no headaches, no weakness  Psychiatric: no mood disturbance    PHYSICAL EXAM:   BP (!) 146/80 (BP Location: Right arm, Patient Position: Sitting, BP Method: Large (Automatic))   Pulse 75   Temp 98.1 °F (36.7 °C) (Oral)   Ht 5' 7" (1.702 m)   Wt 112 kg (246 lb 14.6 oz)   BMI 38.67 kg/m²   Constitutional:  Alert,   Well-appearing  In no distress.   Neurological: Normal speech  no focal findings  CN II - XII grossly intact.  Neurologic exam is completely intact   Psychiatric: Mood and affect appropriate and symmetric.   HEENT: Normocephalic / atraumatic  PERRLA  Midline trachea  No scars across the neck   Cardiac: Regular rate and rhythm.   Pulmonary: Normal pulmonary effort.   Abdomen: Soft, not distended.     Skin: Warm and well perfused.    Vascular:  Radial pulses strong 2+ bilaterally rate, regular "   Extremities/  Musculoskeletal: No edema.        IMAGIN/30 Carotid duplex shows have absolutely no evidence of carotid stenosis bilaterally    IMPRESSION:   No evidence of carotid stenosis by recent duplex exam here which is consistent with her imaging from  in [place]    PLAN:  Would suggest continue with the dual antiplatelet therapy and statin given the absence of any further symptoms.  We discussed that TIAs can be caused by many things that are not carotid related, and that she may want to consider evaluation with a stroke neurologist    P.r.n. follow up with me    LOU Adams III, MD, FACS  Professor and Chief, Vascular and Endovascular Surgery      CC: Tisha Aponte MDRef Provider (PCP)

## 2024-09-04 ENCOUNTER — OFFICE VISIT (OUTPATIENT)
Dept: NEUROLOGY | Facility: CLINIC | Age: 74
End: 2024-09-04
Payer: MEDICARE

## 2024-09-04 VITALS
SYSTOLIC BLOOD PRESSURE: 142 MMHG | HEIGHT: 67 IN | HEART RATE: 67 BPM | DIASTOLIC BLOOD PRESSURE: 90 MMHG | WEIGHT: 246.94 LBS | BODY MASS INDEX: 38.76 KG/M2

## 2024-09-04 DIAGNOSIS — I10 ESSENTIAL HYPERTENSION: ICD-10-CM

## 2024-09-04 DIAGNOSIS — E78.2 MIXED HYPERLIPIDEMIA: ICD-10-CM

## 2024-09-04 DIAGNOSIS — Z86.73 HX-TIA (TRANSIENT ISCHEMIC ATTACK): Primary | ICD-10-CM

## 2024-09-04 DIAGNOSIS — E66.01 SEVERE OBESITY (BMI 35.0-39.9) WITH COMORBIDITY: ICD-10-CM

## 2024-09-04 DIAGNOSIS — G45.9 TIA (TRANSIENT ISCHEMIC ATTACK): ICD-10-CM

## 2024-09-04 PROCEDURE — 99999 PR PBB SHADOW E&M-EST. PATIENT-LVL IV: CPT | Mod: PBBFAC,,, | Performed by: PSYCHIATRY & NEUROLOGY

## 2024-09-04 RX ORDER — FLUOROMETHOLONE 1 MG/ML
1 SUSPENSION/ DROPS OPHTHALMIC DAILY
COMMUNITY
Start: 2024-03-28

## 2024-09-04 RX ORDER — LATANOPROST 50 UG/ML
1 SOLUTION/ DROPS OPHTHALMIC NIGHTLY
COMMUNITY
Start: 2024-06-25

## 2024-09-04 RX ORDER — CLOBETASOL PROPIONATE 0.5 MG/ML
1 SOLUTION TOPICAL DAILY
COMMUNITY
Start: 2024-03-24

## 2024-09-04 RX ORDER — IPRATROPIUM BROMIDE 42 UG/1
2 SPRAY, METERED NASAL
COMMUNITY
Start: 2024-04-15

## 2024-09-04 RX ORDER — TRAMADOL HYDROCHLORIDE 50 MG/1
50 TABLET ORAL EVERY 12 HOURS
COMMUNITY
Start: 2024-04-19

## 2024-09-04 RX ORDER — ALLOPURINOL 100 MG/1
100 TABLET ORAL DAILY
COMMUNITY

## 2024-09-04 NOTE — PROGRESS NOTES
Vascular Neurology  Clinic Note    ___________________  ASSESSMENT & PLAN  74 y.o. female with recent move back from [place] (left to evacuate [place]), daughter relocated her job back to [facility]. Had two TIA's in 2022.   First event, was having trouble using the remote, couldn't talk right. Second episode within the following year, and then nothing since then.      History of TIA  Full outside records have been reviewed, brought by patient on day of visit.  She had 2 events in 2022 classified as TIA, seen at [facility], no events since.  Workup overall appears to be normal. No echo found in records, last was 2017.  No significant changes recommended at this time.  Remain on DAPT  Remain on statin  Remain on current BP regimen, goal is < 130/80 mmHg ;BP at home running 100-120 SBP.  A1c @ goal < 7  Obesity remains significant risk factor - currently w/ b/l knee replacements pending; increased exercise and healthy diet (MIND diet) recommended. Recommend dietician counseling.      Visit Diagnoses:  1. Hx-TIA (transient ischemic attack)    2. TIA (transient ischemic attack)    3. Severe obesity (BMI 35.0-39.9) with comorbidity    4. Essential hypertension    5. Mixed hyperlipidemia           I have spent a total of 45 minutes in chart review, face-to-face encounter, laboratory and/or imaging review and interpretation and documentation for this patient, including counseling and education the patient on conditions discussed.  ____________________________    Brain Imaging:  MRI/MRA 9/2023  1: mild chronic microvascular disease, generalized atrophy  MRA - unremarkable  Vessel Imaging:  IMPRESSION:     1. Brain CTA:   *   No major branch occlusion, flow limiting stenosis, or aneurysm is identified intracranially.     2. Neck CTA:   *   No evidence of hemodynamically significant cervical carotid or vertebral stenosis     CUS  Impression   =========     RIGHT SIDE:   Minimal 1-39% Right Bulb/ICA stenosis.   Heterogeneous  "plaque noted in the right internal carotid artery.   Antegrade flow noted in the right vertebral artery.     LEFT SIDE:   Normal - No evidence of plaque in the left internal carotid artery.   Antegrade flow in the left vertebral artery   Cardiac Evaluation:  · The left ventricular chamber size is normal. Left Ventricular ejection   fraction is 60 - 65%. Left ventricular systolic function is normal.Normal   left ventricular wall thickness and regional wall motion.   · Spectral Doppler shows normal pattern of LV diastolic filling. Normal LV   filling pressure.   · Mild tricuspid valve regurgitation.   · RVSP is 26 mm Hg with estimated RAP of 10 mm Hg.   Other:     Relevant Labwork:  Recent Labs   Lab 08/19/24  1546   Hemoglobin A1C 5.7 H   LDL Cholesterol 58.8 L   HDL 47   Triglycerides 111   Cholesterol 128        I reviewed the reports of the above imaging   I reviewed the above labwork.    Vital Signs:      6/19/2019     3:57 PM 6/19/2019     7:02 PM 7/5/2024     8:52 AM 7/26/2024     2:18 PM 8/19/2024     2:11 PM 9/3/2024     1:07 PM 9/4/2024     8:59 AM   Vitals - 1 value per visit   SYSTOLIC   187  146 146 142   DIASTOLIC   83  77 80 90   Pulse   59  67 75 67   Temp 98.3 °F (36.8 °C)  97.5 °F (36.4 °C)   98.1 °F (36.7 °C)    Resp  20 20       SPO2   96 %       Weight (lb)   240 268 269.84 246.92 246.92   Weight (kg)   108.863 121.564 122.4 112 112   Height   5' 7" (1.702 m) 5' 7" (1.702 m) 5' 7" (1.702 m) 5' 7" (1.702 m) 5' 7" (1.702 m)   BMI (Calculated)   37.6 42 42.3 38.7 38.7   Pain Score    Nine Nine Nine Five       Past Medical History  Past Medical History:   Diagnosis Date    Gout     Hypertension      Current Outpatient Medications   Medication Instructions    acetaminophen (TYLENOL) 500 mg, Oral, Daily    allopurinoL (ZYLOPRIM) 100 mg, Oral, Daily    aspirin (ECOTRIN) 81 mg, Daily    calcium carbonate (OS-NOBLE) 1,200 mg, Oral, Once    clobetasoL (TEMOVATE) 0.05 % external solution 1 mL, Topical (Top), " Daily    clopidogreL (PLAVIX) 75 mg, Oral, Daily    colchicine (COLCRYS) 0.6 mg, Oral, Daily PRN    famotidine (PEPCID) 40 mg, Oral, Daily    fluorometholone 0.1% (FML) 0.1 % DrpS 1 drop, Both Eyes, Daily    furosemide (LASIX) 20 mg, Oral, 2 times daily    gabapentin (NEURONTIN) 300 mg, Oral, 2 times daily    ipratropium (ATROVENT) 42 mcg (0.06 %) nasal spray 2 sprays, Each Nostril, As needed (PRN)    latanoprost 0.005 % ophthalmic solution 1 drop, Both Eyes, Nightly    losartan (COZAAR) 50 mg, Daily    metoprolol succinate (TOPROL-XL) 25 mg, Oral, Daily    rosuvastatin (CRESTOR) 20 mg, Oral, Nightly    traMADoL (ULTRAM) 50 mg, Oral, Every 12 hours    vitamin D (VITAMIN D3) 1,000 Units, Oral, Daily        Social History  Social History     Socioeconomic History    Marital status: Single   Tobacco Use    Smoking status: Never   Substance and Sexual Activity    Alcohol use: No    Drug use: No     Social Determinants of Health     Financial Resource Strain: Medium Risk (8/16/2024)    Overall Financial Resource Strain (CARDIA)     Difficulty of Paying Living Expenses: Somewhat hard   Food Insecurity: Food Insecurity Present (8/16/2024)    Hunger Vital Sign     Worried About Running Out of Food in the Last Year: Sometimes true     Ran Out of Food in the Last Year: Sometimes true   Physical Activity: Inactive (8/16/2024)    Exercise Vital Sign     Days of Exercise per Week: 0 days     Minutes of Exercise per Session: 10 min   Stress: No Stress Concern Present (8/16/2024)    [employer] - Occupational Stress Questionnaire     Feeling of Stress : Only a little   Housing Stability: Unknown (8/16/2024)    Housing Stability Vital Sign     Unable to Pay for Housing in the Last Year: No              MD Laxmi  Vascular Neurology  Office [phone]

## 2024-09-23 NOTE — TELEPHONE ENCOUNTER
----- Message from Ligia Hammad sent at 9/23/2024  9:21 AM CDT -----  Regarding: SELF  Type: Patient Call Back     Who called:SELF     What is the request in detail:pt called because she is out of gabapentin (NEURONTIN) 300 MG capsule medication, but does not see PCP until 11/12, and is stating she is currently in pain     Can the clinic reply by [facility]?-No     Would the patient rather a call back or a response via [facility]?-call back     Best call back number:[phone]

## 2024-09-24 RX ORDER — GABAPENTIN 300 MG/1
300 CAPSULE ORAL 2 TIMES DAILY
Qty: 30 CAPSULE | Refills: 0 | Status: SHIPPED | OUTPATIENT
Start: 2024-09-24

## 2024-09-30 ENCOUNTER — OFFICE VISIT (OUTPATIENT)
Dept: ORTHOPEDICS | Facility: CLINIC | Age: 74
End: 2024-09-30
Payer: MEDICARE

## 2024-09-30 VITALS — BODY MASS INDEX: 41.25 KG/M2 | HEIGHT: 67 IN | WEIGHT: 262.81 LBS

## 2024-09-30 DIAGNOSIS — M17.11 PRIMARY OSTEOARTHRITIS OF RIGHT KNEE: Primary | ICD-10-CM

## 2024-09-30 DIAGNOSIS — M17.11 PRIMARY OSTEOARTHRITIS OF RIGHT KNEE: ICD-10-CM

## 2024-09-30 DIAGNOSIS — M25.561 CHRONIC PAIN OF RIGHT KNEE: ICD-10-CM

## 2024-09-30 DIAGNOSIS — G89.29 CHRONIC PAIN OF RIGHT KNEE: ICD-10-CM

## 2024-09-30 PROCEDURE — 99214 OFFICE O/P EST MOD 30 MIN: CPT | Mod: S$GLB,,, | Performed by: ORTHOPAEDIC SURGERY

## 2024-09-30 PROCEDURE — 1101F PT FALLS ASSESS-DOCD LE1/YR: CPT | Mod: CPTII,S$GLB,, | Performed by: ORTHOPAEDIC SURGERY

## 2024-09-30 PROCEDURE — 1125F AMNT PAIN NOTED PAIN PRSNT: CPT | Mod: CPTII,S$GLB,, | Performed by: ORTHOPAEDIC SURGERY

## 2024-09-30 PROCEDURE — 3288F FALL RISK ASSESSMENT DOCD: CPT | Mod: CPTII,S$GLB,, | Performed by: ORTHOPAEDIC SURGERY

## 2024-09-30 PROCEDURE — 1159F MED LIST DOCD IN RCRD: CPT | Mod: CPTII,S$GLB,, | Performed by: ORTHOPAEDIC SURGERY

## 2024-09-30 PROCEDURE — 99999 PR PBB SHADOW E&M-EST. PATIENT-LVL III: CPT | Mod: PBBFAC,,, | Performed by: ORTHOPAEDIC SURGERY

## 2024-09-30 NOTE — PROGRESS NOTES
Subjective:      Patient ID: Sandra Lui is a 74 y.o. female.    Chief Complaint: Pain of the Right Knee and Pain of the Left Knee      History of Present Illness      Sandra Lui is a 74 y.o. female here with a 3 year history of bilateral knee pain (R>L). The patient is a  retiree. There was not a history of trauma.  The pain is severe.  The pain is located in the global aspect of the knee. There is is radiation to the foot.  The pain is described as dull, aching and throbbing. The patient has not had prior surgery on the knees before. It is aggravated by standing, walking, and with moderate activity.  It is alleviated by rest. There is not numbness or tingling of the lower extremity.  There is back pain.  She  has tried medications or injections, tylenol, CSI, Gel injection, and RFA. Her last injection was April 15th 2024 in [place]. They have helped for a short time.  She does have difficulty getting in or out of a car, getting dressed, or going up or down stairs.  The patient does use an assistive device (Cane).       Past Medical History:   Diagnosis Date    Gout     Hypertension      Past Surgical History:   Procedure Laterality Date    ANKLE SURGERY      CHOLECYSTECTOMY      HYSTERECTOMY      TONSILLECTOMY      TUBAL LIGATION       No family history on file.  Social History     Socioeconomic History    Marital status: Single   Tobacco Use    Smoking status: Never   Substance and Sexual Activity    Alcohol use: No    Drug use: No     Social Drivers of Health     Financial Resource Strain: Medium Risk (8/16/2024)    Overall Financial Resource Strain (CARDIA)     Difficulty of Paying Living Expenses: Somewhat hard   Food Insecurity: Food Insecurity Present (8/16/2024)    Hunger Vital Sign     Worried About Running Out of Food in the Last Year: Sometimes true     Ran Out of Food in the Last Year: Sometimes true   Physical Activity: Inactive (8/16/2024)    Exercise Vital Sign     Days of Exercise per Week: 0  days     Minutes of Exercise per Session: 10 min   Stress: No Stress Concern Present (8/16/2024)    [employer] - Occupational Stress Questionnaire     Feeling of Stress : Only a little   Housing Stability: Unknown (8/16/2024)    Housing Stability Vital Sign     Unable to Pay for Housing in the Last Year: No     Current Outpatient Medications on File Prior to Visit   Medication Sig Dispense Refill    acetaminophen (TYLENOL) 500 MG tablet Take 500 mg by mouth once daily.      allopurinoL (ZYLOPRIM) 100 MG tablet Take 100 mg by mouth once daily.      aspirin (ECOTRIN) 81 MG EC tablet Take 81 mg by mouth once daily.      calcium carbonate (OS-NOBLE) 600 mg calcium (1,500 mg) Tab Take 1,200 mg by mouth once.      clobetasoL (TEMOVATE) 0.05 % external solution Apply 1 mL topically once daily.      clopidogreL (PLAVIX) 75 mg tablet Take 75 mg by mouth once daily.      colchicine (COLCRYS) 0.6 mg tablet Take 0.6 mg by mouth daily as needed.      famotidine (PEPCID) 40 MG tablet Take 40 mg by mouth once daily.      fluorometholone 0.1% (FML) 0.1 % DrpS Place 1 drop into both eyes Daily.      furosemide (LASIX) 20 MG tablet Take 20 mg by mouth 2 (two) times daily.      gabapentin (NEURONTIN) 300 MG capsule Take 1 capsule (300 mg total) by mouth 2 (two) times daily. 30 capsule 0    ipratropium (ATROVENT) 42 mcg (0.06 %) nasal spray 2 sprays by Each Nostril route as needed.      latanoprost 0.005 % ophthalmic solution Place 1 drop into both eyes every evening.      losartan (COZAAR) 50 MG tablet Take 50 mg by mouth once daily.      metoprolol succinate (TOPROL-XL) 25 MG 24 hr tablet Take 25 mg by mouth once daily.      rosuvastatin (CRESTOR) 20 MG tablet Take 20 mg by mouth every evening.      traMADoL (ULTRAM) 50 mg tablet Take 50 mg by mouth every 12 (twelve) hours.      vitamin D (VITAMIN D3) 1000 units Tab Take 1,000 Units by mouth once daily.       No current facility-administered medications on  "file prior to visit.     Review of patient's allergies indicates:   Allergen Reactions    Adhesive     Erythromycin     Neosporin (neomycin-polymyx) Rash       Review of Systems   Constitutional: Negative for chills, fever and night sweats.   HENT:  Negative for hearing loss.    Eyes:  Negative for blurred vision and double vision.   Cardiovascular:  Negative for chest pain, claudication and leg swelling.   Respiratory:  Negative for shortness of breath.    Endocrine: Negative for polydipsia, polyphagia and polyuria.   Hematologic/Lymphatic: Negative for adenopathy and bleeding problem. Does not bruise/bleed easily.   Skin:  Negative for poor wound healing.   Gastrointestinal:  Negative for diarrhea and heartburn.   Genitourinary:  Negative for bladder incontinence.   Neurological:  Negative for focal weakness, headaches, numbness, paresthesias and sensory change.   Psychiatric/Behavioral:  The patient is not nervous/anxious.    Allergic/Immunologic: Negative for persistent infections.         Objective:      Body mass index is 41.16 kg/m².  Vitals:    09/30/24 1317   Weight: 119.2 kg (262 lb 12.6 oz)   Height: 5' 7" (1.702 m)         General    Constitutional: She is oriented to person, place, and time. She appears well-developed and well-nourished.   HENT:   Head: Normocephalic and atraumatic.   Eyes: EOM are normal.   Cardiovascular:  Normal rate.            Pulmonary/Chest: Effort normal.   Neurological: She is alert and oriented to person, place, and time.   Psychiatric: She has a normal mood and affect. Her behavior is normal.     General Musculoskeletal Exam   Gait: antalgic   Pelvic Obliquity: none      Right Knee Exam     Inspection   Alignment:  normal  Erythema: absent  Scars: absent  Swelling: present  Effusion: present  Deformity: present (varus)  Bruising: absent    Tenderness   The patient is tender to palpation of the patella and medial joint line.    Range of Motion   Extension:  0   Flexion:  110 "     Tests   Ligament Examination   Lachman: normal (-1 to 2mm)   MCL - Valgus: normal (0 to 2mm)  LCL - Varus: normal  Patella   Passive Patellar Tilt: neutral  Patellar Tracking: normal    Other   Popliteal (Baker's) Cyst: present  Sensation: normal    Comments:  1+ Pitting edema, Varus alignment    Left Knee Exam     Inspection   Alignment:  normal  Erythema: absent  Scars: absent  Swelling: absent  Effusion: absent  Deformity: absent  Bruising: absent    Tenderness   The patient tender to palpation of the patella and medial joint line.    Range of Motion   Extension:  0   Flexion:  120     Tests   Stability   Lachman: normal (-1 to 2mm)   MCL - Valgus: normal (0 to 2mm)  LCL - Varus: normal (0 to 2mm)  Patella   Passive Patellar Tilt: neutral  Patellar Tracking: normal    Other   Sensation: normal    Comments:  1+ Pitting edema     Right Hip Exam     Inspection   Scars: absent  Swelling: absent  Bruising: absent  No deformity of hip.  Quadriceps Atrophy:  Negative  Erythema: absent    Tenderness   The patient tender to palpation of the trochanteric bursa.    Range of Motion   Abduction:  30   Adduction:  15   Extension:  0   Flexion:  100   External rotation:  30   Internal rotation:  15     Tests   Pain w/ forced internal rotation (YAEL): absent  Stinchfield test: negative    Other   Sensation: normal  Left Hip Exam     Inspection   Scars: absent  Swelling: absent  No deformity of hip.  Quadriceps Atrophy:  negative  Erythema: absent  Bruising: absent    Tenderness   The patient tender to palpation of the trochanteric bursa.    Range of Motion   Abduction:  30   Adduction:  15   Extension:  0   Flexion:  100   External rotation:  20   Internal rotation: 10     Tests   Pain w/ forced internal rotation (YAEL): absent  Stinchfield test: negative    Other   Sensation: normal      Back (L-Spine & T-Spine) / Neck (C-Spine) Exam   Back exam is normal.      Muscle Strength   Right Lower Extremity   Hip Abduction: 5/5  "  Hip Adduction: 5/5   Hip Flexion: 5/5   Quadriceps:  5/5   Hamstrin/5   Ankle Dorsiflexion:  5/5   Left Lower Extremity   Hip Abduction: 5/5   Hip Adduction: 5/5   Hip Flexion: 5/5   Quadriceps:  5/5   Hamstrin/5   Ankle Dorsiflexion:  5/5     Reflexes     Left Side  Quadriceps:  2+    Right Side   Quadriceps:  2+    Vascular Exam     Right Pulses  Dorsalis Pedis:      2+  Posterior Tibial:      2+        Left Pulses  Dorsalis Pedis:      2+  Posterior Tibial:      2+        Capillary Refill  Right Hand: normal capillary refill  Left Hand: normal capillary refill        Edema  Right Upper Leg: absent  Right Lower Leg: present  Left Upper Leg: absent  Left Lower Leg: present                Results      Radiographs taken  a few months ago  and reviewed by me demonstrate severe arthritic change of the right knee and moderate arthritic changes of the left knee.  There  is not bone destruction.  There is not a fracture. The medial compartment is most involved.  There is a varus deformity.  The changes are tricompartmental.    Assessment:       Encounter Diagnosis   Name Primary?    Primary osteoarthritis of right knee           Plan:       Sandra SWENSON "Amanda" was seen today for pain and pain.    Diagnoses and all orders for this visit:    Primary osteoarthritis of right knee  -     Ambulatory referral/consult to Orthopedics    Treatment options were discussed. The surgical process of robotically assisted right knee replacement was discussed in detail with the patient including a detailed discussion of the procedure itself (including visual model, x-ray review, and literature review). We discussed options including implant type and why I believe the implant selected is a good match for the patient's needs. The patient expressed understanding and agrees to proceed with the planned surgeryThe typical perioperative and post-operative course was discussed and perioperative risks were discussed to the patient's " satisfaction.  Risks and complications discussed included but were not limited to the risks of anesthetic complications, infection, bleeding, wound healing complications, fracture through the pin sights, stiffness, aseptic loosening, instability, limb length inequality, neurologic dysfunction including numbness and weakness, additional surgery,  DVT, pulmonary embolism, perioperative medical risks (cardiac, pulmonary, renal, neurologic), and death and the patient elects to proceed.  The patient should get medically cleared and attend the joint seminar.Sandra Lui is aware that morbid obesity carries increased risks with joint replacement surgery.  These include problems with wound healing, alignment of the prosthesis, higher chance of infection and other medical complications, as well as the potential for early implant failure.        ASA for DVT prophylaxis/ will hold plavix one week post op.    Same day

## 2024-10-02 ENCOUNTER — TELEPHONE (OUTPATIENT)
Dept: PREADMISSION TESTING | Facility: HOSPITAL | Age: 74
End: 2024-10-02
Payer: MEDICARE

## 2024-10-02 RX ORDER — AMOXICILLIN 500 MG
CAPSULE ORAL DAILY
COMMUNITY

## 2024-10-02 NOTE — ANESTHESIA PAT ROS NOTE
10/21/2024  Sandra Lui is a 74 y.o., female with Primary osteoarthritis of right knee, arrives for anesthesia assessment and preop instructions.      Pre-op Assessment    I have reviewed the Patient Summary Reports.     I have reviewed the Nursing Notes. I have reviewed the NPO Status.   I have reviewed the Medications.     Review of Systems  Anesthesia Hx:  No problems with previous Anesthesia             Denies Family Hx of Anesthesia complications.    Denies Personal Hx of Anesthesia complications.                    Social:  Non-Smoker, Social Alcohol Use       Hematology/Oncology:    Oncology Normal    -- Denies Anemia:                                  EENT/Dental:  chronic allergic rhinitis 10/13/2024 & remains unsolved  ED VISIT  Nasal Congestion       Yellow and green mucus per patient x4 days    Clinical Impression:  Final diagnoses:  [R09.81] Nasal congestion (Primary)  [J00] Acute rhinitis    ED RX: Flonase, albuterol (PROVENTIL/VENTOLIN HFA) 90 mcg/actuation inhaler Eyes: Visual Impairment   Has Bilateral and S/P Extraction - Bilateral Catarract           Ears General/Symptom(s) Hearing Impairment:             Cardiovascular:  Exercise tolerance: poor   Denies Pacemaker. Hypertension, well controlled       Denies Angina.     hyperlipidemia FORD   7/5/2024  XR CHEST AP PORTABLE     CLINICAL HISTORY:  Chest Pain;     TECHNIQUE:  Single frontal view of the chest was performed.     COMPARISON:  None     FINDINGS:  Lungs are well expanded.  No acute consolidation, pleural effusion, or pneumothorax.     Cardiac silhouette is normal in size.     Impression:   No acute cardiopulmonary process seen.     Electronically signed by:Agustina Montes  Date  07/05/2024   Patient on beta blockers  Functional Capacity 3 METS        Chronic Venous Insufficiency, bilateral lower extremity  Peripheral Arterial  Disease   08/30/2024 BILATERAL CAROTID STUDIES:  Impression   =========     RIGHT SIDE:   Minimal 1-39% Right Bulb/ICA stenosis.   Heterogeneous plaque noted in the right internal carotid artery.   Antegrade flow noted in the right vertebral artery.     LEFT SIDE:   Normal - No evidence of plaque in the left internal carotid artery.   Antegrade flow in the left vertebral artery.       DATE OF SERVICE: 08/30/2024 BILATERAL CAROTID STUDIES:                                                      Sonographer: GISSELL AUGUSTIN RDMS, RVT   Electronically Signed by: TAE Adams M.D. at 08/31/2024-08:28             Hypertension         Pulmonary:  Denies Pneumonia  Denies COPD. Asthma mild Shortness of breath Recent URI, unresolved Sleep Apnea, CPAP first COVID infection 2022 and used with second COVID infec early 2023.                Renal/:  Chronic Renal Disease                Hepatic/GI:   Denies PUD.  GERD, well controlled Denies Liver Disease.  Denies Hepatitis. Cholescystectomy Not Taking GLP-1 Agonists            Musculoskeletal:  Arthritis    Musculoskeletal General/Symptoms: joint pain, joint stiffness, joint immobility, low back pain, neck pain, sciatica. Functional capacity is ambulatory with cane. Decreased range of motion (ROM) of right knee    XR KNEE ORTHO BILAT WITH FLEXION     CLINICAL HISTORY:  Pain in right knee     TECHNIQUE:  AP standing of both knees, PA flexion standing views of both knees, and Merchant views of both knees were performed.    Lateral views of both knees were also performed.     COMPARISON:  None     FINDINGS:  DJD with narrowing of the patellofemoral and the medial tibiofemoral joint spaces bilaterally more marked on the right side.  No fracture or dislocation.    No bone destruction identified     Electronically signed by:  Isiah Cruz MD  Date:    07/26/2024    Joint Disease:  Arthritis, Osteoarthritis, Gout  last gout flare was ~  Aug/2024 2 mos ago. Says takes gabapentin/  Tylenol 500 mg for her RA and tramadol for her PA.  Bone Disorders:    Osteoporosis   Spine Disorders: cervical Disc disease, Degenerative disease and Chronic Pain       Cervical Spine Disorder, Cervical Disc Disease R CERVICAL SPINE AP LATERAL     CLINICAL HISTORY:  Cervicalgia     TECHNIQUE:  AP, lateral and open mouth views of the cervical spine were performed.     COMPARISON:  None.     FINDINGS:  There is straightening of the normal lordosis.    C5-6 and C6-7 demonstrate disc space narrowing and mild to moderate osteophytic spurring.    A lesser degree of osteophytic spurring present at C3-4 and C4-5.    The odontoid is intact.     Impression:   Osteoarthritic degenerative change.    Electronically signed by:  Pam Whiting MD  Date:    10/08/2024    Lumbar Spine Disorders, Lumbar Disc Disease, Radiculopathy   OB/GYN/PEDS:  Hysterectomy  Tubal Ligation           Neurological:  TIA,  Neuromuscular Disease,   Denies Seizures.    Myofascial pain,   Chronic neck pain, Chronic pain of both shoulders,   Polymyalgia,         9/4/2024  Vascular Neurology  Clinic Note  ASSESSMENT & PLAN  74 y.o. female with recent move back from [place] (left to evacuate [place]), daughter relocated her job back to [facility]. Had two TIA's in 2022.   First event, was having trouble using the remote, couldn't talk right. Second episode within the following year, and then nothing since then.        History of TIA  She had 2 events in 2022 classified as TIA, seen at [facility], no events since.  Workup overall appears to be normal. No echo found in records, last was 2017.  No significant changes recommended at this time.  Remain on DAPT  Remain on statin  Remain on current BP regimen, goal is < 130/80 mmHg ;BP at home running 100-120 SBP.  A1c @ goal < 7  Obesity remains significant risk factor - currently w/ b/l knee replacements pending; increased exercise and healthy diet (MIND diet) recommended. Recommend dietician counseling.      MD Laxmi  Vascular Neurology  Office [phone]       Electronically signed by Ayaz Ferrari MD at 9/4/2024    Electronically signed by Ayaz Ferrari MD at 9/4/2024      Osteoarthritis                           Endocrine:   Denies Hypothyroidism.  Denies Hyperthyroidism. Pre-Diabetes      Morbid Obesity / BMI > 40  Dermatological:  Skin Normal    Psych:   anxiety depression                Physical Exam  General: Well nourished, Cooperative, Alert and Oriented    Airway:  Mouth Opening: Normal  Tongue: Large  Neck ROM: Left Lateral Motion Decreased  Neck: Girth Increased    Dental:  Partial Dentures, Dentures  Full top plate  Partial bottom  Chest/Lungs:  Clear to auscultation, Normal Respiratory Rate    Heart:  Rate: Bradycardia  Rhythm: Regular Rhythm  Sounds: Normal      Past Surgical History:   Procedure Laterality Date    ANKLE FRACTURE SURGERY    CHOLECYSTECTOMY    HYSTERECTOMY    TONSILLECTOMY    TUBAL LIGATION         Anesthesia Assessment: Preoperative EQUATION    Planned Procedure: Procedure(s) (LRB):  ARTHROPLASTY, KNEE, TOTAL, OCTAVIO COMPUTER-ASSISTED NAVIGATION: RIGHT: SAME DAY (Right)  Requested Anesthesia Type:Regional  Surgeon: Sha Duran MD  Service: Orthopedics  Known or anticipated Date of Surgery:11/5/2024    Surgeon notes: reviewed    Electronic QUestionnaire Assessment completed via nurse interview with patient.        Triage considerations:     The patient has no apparent active cardiac condition (No unstable coronary Syndrome such as severe unstable angina or recent [<1 month] myocardial infarction, decompensated CHF, severe valvular   disease or significant arrhythmia)    Previous anesthesia records:Not available   Patient reports no personal or family history of anesthesia complications.    Last PCP note: within 3 months , within [facility] (Dr. Dalton)  Subspecialty notes: Ortho  Neurology                    Dr. Ferrari  VS                                  Dr. Adams  Rheumatology   (CE)   Dr. Platt    Other important co-morbidities: GERD, HLD, HTN, Morbid Obesity, and RUSH, TIA x 2 (last 2023) Plavix/asa 81 mg, Carotid Artery disease (JITENDRA stenosis 1-39%), Polymyalgia Rheumatica, Back pain w/ spinal stenosis L3-5 & mild bulging disc L3/4, Asthma (inhlr prn), pre-DM, Gout, Closed head injury w/ concussion 4/2023, CKD3 (Cr 1.1/ GFR 52.1), Tramadol use, osteoporosis, recent cystitis 7/2024 (tx)      Tests already available:  Available tests,  outside [facility] , within [facility] .   8/31/24     VAS US Carotid [place]  8/19/24     A1C   TSH   CMP   CBC   Lipid panel   U/A  7/5/24       Hep C Ab   HIV 1/2 Ag/Ab   BNP   Troponin #1                            CXR   EKG  9/7/23    (media)   MRI/ MRA Brain  8/1/23    (media)   EEG  1/18/22  (CE)        CTA Head & Neck       VAS US Venous Legs cody  7/23/21  (media)   MRI Lumbar spine  9/2/17    (CE)        NM Myocardial Perfusion  9/1/17    (CE)        2 D Echo                 Instructions given. (See in Nurse's note)    Optimization:  Anesthesia Preop Clinic Assessment  Indicated   Will schedule POC.    Medical Opinion Indicated       Sub-specialist consult indicated:   [facility] Pre op [facility] Dr. Perkins.       Plan:    Testing:  PT/INR   Pre-anesthesia  visit       Visit focus: possible regional anesthesia and/or nerve block      Consultation:IM Perioperative Hospitalist     Patient  has previously scheduled Medical Appointment:    Navigation: Tests Scheduled.              Consults scheduled.             Results will be tracked by Preop Clinic.     10/2/24  Message from Ayaz Ferrari MD sent at 10/2/2024  1:47 PM CDT -----  Regarding: RE: surgical clearance/ plavix pre op 7 day hold instructions  Sounds good. She can hold plaix  ----- Message -----  From: Sandra Salas RN  Sent: 10/2/2024  11:30 AM CDT  To: Pat Perkins MD; Ayaz Ferrari MD; #  Subject: surgical clearance/ plavix pre op 7 day hold#      ,    This patient is scheduled for surgery (Arthroplasty, knee, total Rt) on 11/5/24 with Dr. Duran. This will last approximately 120 min under anesthesia. Requesting surgical clearance and management prior to this procedure, along with medication (Plavix) instructions. Requesting 7 day Plavix hold per anesthesia guidelines for neuraxial anesthesia, Aspirin 81 mg does not require hold.  Please advise.      Thanks in advance,  Amanda Salas RN                                                                                [facility]      10/21/24 Medical Optimization pending clinic visit with Dr. Perkins on 10/23/24.  Kathleen Avalos RN  Anesthesia Clinician                10/23/24  Patient is optimized     Patient/ care giver/ Family member was instructed to call and update me about any changes to health,  medication, office visits ,testing out side of the [facility] , hospitalizations between now and surgery       Pat Perkins MD  Internal Medicine  [facility]   Cell Phone- [phone]

## 2024-10-02 NOTE — PRE-PROCEDURE INSTRUCTIONS
Called and spoke with patient. Explained she is to hold her plavix 1 wk prior to surgery per Dr. Ferrari , hold starting Oct 29  (last dose will be Monday Oct 28).  Patient verbalized understanding and agreed.

## 2024-10-02 NOTE — PRE-PROCEDURE INSTRUCTIONS
Chart review; triage plan initiated.    Spoke to patient regarding upcoming scheduled surgery.  Name, , and allergies verified.  Medical, surgical, and anesthesia history reviewed.  Instructed to hold vitamins, supplements and NSAIDs for one week prior to surgery (last day can take is Oct 28). Informed that the remaining medication instructions will be provided at the POC visit. Pt verbalized understanding.     Patient explained her last gout flare was ~ 2 mos ago. Says takes gabapentin/ Tylenol 500 mg for her RA and tramadol for her [place].  Patient also explained was given an inhaler with her first COVID infection  and used with second COVID infec early .     Explained will call her when receive pre op plavix instructions from Dr. Ferrari.

## 2024-10-02 NOTE — TELEPHONE ENCOUNTER
----- Message from Ayaz Ferrari MD sent at 10/2/2024  1:47 PM CDT -----  Regarding: RE: surgical clearance/ plavix pre op 7 day hold instructions  Sounds good. She can hold plaix  ----- Message -----  From: Sandra Salas RN  Sent: 10/2/2024  11:30 AM CDT  To: Pat Perkins MD; Aayz Ferrari MD; #  Subject: surgical clearance/ plavix pre op 7 day hold#    ,    This patient is scheduled for surgery (Arthroplasty, knee, total Rt) on 11/5/24 with Dr. Duran. This will last approximately 120 min under anesthesia. Requesting surgical clearance and management prior to this procedure, along with medication (Plavix) instructions. Requesting 7 day Plavix hold per anesthesia guidelines for neuraxial anesthesia, Aspirin 81 mg does not require hold.  Please advise.     Thanks in advance,  Amanda Salas RN                                                                                [facility]

## 2024-10-07 ENCOUNTER — OFFICE VISIT (OUTPATIENT)
Dept: PAIN MEDICINE | Facility: CLINIC | Age: 74
End: 2024-10-07
Payer: MEDICARE

## 2024-10-07 ENCOUNTER — HOSPITAL ENCOUNTER (OUTPATIENT)
Dept: RADIOLOGY | Facility: OTHER | Age: 74
Discharge: HOME | End: 2024-10-07
Attending: STUDENT IN AN ORGANIZED HEALTH CARE EDUCATION/TRAINING PROGRAM
Payer: MEDICARE

## 2024-10-07 VITALS
WEIGHT: 262.81 LBS | HEART RATE: 74 BPM | RESPIRATION RATE: 18 BRPM | SYSTOLIC BLOOD PRESSURE: 150 MMHG | BODY MASS INDEX: 41.16 KG/M2 | OXYGEN SATURATION: 100 % | DIASTOLIC BLOOD PRESSURE: 83 MMHG | TEMPERATURE: 98 F

## 2024-10-07 DIAGNOSIS — M35.3 POLYMYALGIA: ICD-10-CM

## 2024-10-07 DIAGNOSIS — M54.2 NECK PAIN: ICD-10-CM

## 2024-10-07 DIAGNOSIS — G89.29 CHRONIC PAIN OF BOTH KNEES: ICD-10-CM

## 2024-10-07 DIAGNOSIS — M25.512 CHRONIC PAIN OF BOTH SHOULDERS: ICD-10-CM

## 2024-10-07 DIAGNOSIS — M25.519 SHOULDER PAIN, UNSPECIFIED CHRONICITY, UNSPECIFIED LATERALITY: ICD-10-CM

## 2024-10-07 DIAGNOSIS — M06.9 RHEUMATOID ARTHRITIS, INVOLVING UNSPECIFIED SITE, UNSPECIFIED WHETHER RHEUMATOID FACTOR PRESENT: ICD-10-CM

## 2024-10-07 DIAGNOSIS — G89.4 CHRONIC PAIN DISORDER: Primary | ICD-10-CM

## 2024-10-07 DIAGNOSIS — G89.29 CHRONIC NECK PAIN: ICD-10-CM

## 2024-10-07 DIAGNOSIS — M79.18 MYOFASCIAL PAIN: ICD-10-CM

## 2024-10-07 DIAGNOSIS — G89.29 OTHER CHRONIC PAIN: ICD-10-CM

## 2024-10-07 DIAGNOSIS — G89.29 CHRONIC PAIN OF BOTH SHOULDERS: ICD-10-CM

## 2024-10-07 DIAGNOSIS — M54.2 CHRONIC NECK PAIN: ICD-10-CM

## 2024-10-07 DIAGNOSIS — M25.511 CHRONIC PAIN OF BOTH SHOULDERS: ICD-10-CM

## 2024-10-07 DIAGNOSIS — M25.562 CHRONIC PAIN OF BOTH KNEES: ICD-10-CM

## 2024-10-07 DIAGNOSIS — M25.561 CHRONIC PAIN OF BOTH KNEES: ICD-10-CM

## 2024-10-07 PROCEDURE — 99204 OFFICE O/P NEW MOD 45 MIN: CPT | Mod: S$GLB,,, | Performed by: STUDENT IN AN ORGANIZED HEALTH CARE EDUCATION/TRAINING PROGRAM

## 2024-10-07 PROCEDURE — 1125F AMNT PAIN NOTED PAIN PRSNT: CPT | Mod: CPTII,S$GLB,, | Performed by: STUDENT IN AN ORGANIZED HEALTH CARE EDUCATION/TRAINING PROGRAM

## 2024-10-07 PROCEDURE — 3077F SYST BP >= 140 MM HG: CPT | Mod: CPTII,S$GLB,, | Performed by: STUDENT IN AN ORGANIZED HEALTH CARE EDUCATION/TRAINING PROGRAM

## 2024-10-07 PROCEDURE — 3288F FALL RISK ASSESSMENT DOCD: CPT | Mod: CPTII,S$GLB,, | Performed by: STUDENT IN AN ORGANIZED HEALTH CARE EDUCATION/TRAINING PROGRAM

## 2024-10-07 PROCEDURE — 3079F DIAST BP 80-89 MM HG: CPT | Mod: CPTII,S$GLB,, | Performed by: STUDENT IN AN ORGANIZED HEALTH CARE EDUCATION/TRAINING PROGRAM

## 2024-10-07 PROCEDURE — 1159F MED LIST DOCD IN RCRD: CPT | Mod: CPTII,S$GLB,, | Performed by: STUDENT IN AN ORGANIZED HEALTH CARE EDUCATION/TRAINING PROGRAM

## 2024-10-07 PROCEDURE — 1160F RVW MEDS BY RX/DR IN RCRD: CPT | Mod: CPTII,S$GLB,, | Performed by: STUDENT IN AN ORGANIZED HEALTH CARE EDUCATION/TRAINING PROGRAM

## 2024-10-07 PROCEDURE — 73030 X-RAY EXAM OF SHOULDER: CPT | Mod: TC,50,FY

## 2024-10-07 PROCEDURE — 73030 X-RAY EXAM OF SHOULDER: CPT | Mod: 26,50,, | Performed by: RADIOLOGY

## 2024-10-07 PROCEDURE — 1101F PT FALLS ASSESS-DOCD LE1/YR: CPT | Mod: CPTII,S$GLB,, | Performed by: STUDENT IN AN ORGANIZED HEALTH CARE EDUCATION/TRAINING PROGRAM

## 2024-10-07 PROCEDURE — 99999 PR PBB SHADOW E&M-EST. PATIENT-LVL V: CPT | Mod: PBBFAC,,, | Performed by: STUDENT IN AN ORGANIZED HEALTH CARE EDUCATION/TRAINING PROGRAM

## 2024-10-07 NOTE — PROGRESS NOTES
Chronic Pain - New Consult    Referring Physician: Kimberley Sweet, ISAIAH    Chief Complaint:   Chief Complaint   Patient presents with    Shoulder Pain    Arm Pain          SUBJECTIVE:    Sandra Lui presents to the clinic for the evaluation of arm, shoulder, and neck pain. Patient was being treated for polymyalgia at a pain clinic in [place] with prednisone for flares, intramuscular steroid injections, gabapentin 300 mg BID, tramadol 50 mg prn. The pain started 6 year ago and symptoms have been worsening. Denies inciting injury and trauma. The pain today is located in the right sided neck, BL shoulders, BL bicep region, wrists, fingers. Endorses subjective hand weakness and finger swelling once a week. Reports a history of RA and was being treated by a rheumatologist in the remote past. The pain is described as constant aching and throbbing and is rated as 10/10. The pain is rated with a score of  0/10 on the BEST day and a score of 10/10 on the WORST day.  Pain sx are worse at night, and significantly impact sleep. The patient reports <1 hours of uninterrupted sleep per night. Symptoms interfere with daily activity and sleeping. The pain is exacerbated by cooking, cervical flexion and extension and rotation, overhead activity, and by most activity involving the upper body.  The pain is mitigated by heat and medications.  Reports history of Carpal tunnel release surgery BL 4 years. Has upcoming knee arthroplasty in Nov 2024.      Patient denies urinary incontinence, bowel incontinence, significant weight loss, significant motor weakness, and loss of sensations.     Physical Therapy/Home Exercise: completed PT for low back pain in 2023, which worsened the pain. Does not complete HEP.     Pain Disability Index Review:       No data to display                Pain Medications:  gabapentin 300 mg BID - moderately helpful  tramadol 50 mg prn - moderately helpful   Tylenol 500 mg prn - not helpful        report:   Reviewed and consistent with medication use as prescribed.    Pain Procedures:   Lumbar RFA 2022 - significant relief   Lumbar DILMA x 2 2020 - significant relief     Imaging: no pertinent imaging    Past Medical History:   Diagnosis Date    Gout     Hypertension      Past Surgical History:   Procedure Laterality Date    ANKLE SURGERY      CHOLECYSTECTOMY      HYSTERECTOMY      TONSILLECTOMY      TUBAL LIGATION       Social History     Socioeconomic History    Marital status: Single   Tobacco Use    Smoking status: Never   Substance and Sexual Activity    Alcohol use: No    Drug use: No     Social Drivers of Health     Financial Resource Strain: Medium Risk (8/16/2024)    Overall Financial Resource Strain (CARDIA)     Difficulty of Paying Living Expenses: Somewhat hard   Food Insecurity: Food Insecurity Present (8/16/2024)    Hunger Vital Sign     Worried About Running Out of Food in the Last Year: Sometimes true     Ran Out of Food in the Last Year: Sometimes true   Physical Activity: Inactive (8/16/2024)    Exercise Vital Sign     Days of Exercise per Week: 0 days     Minutes of Exercise per Session: 10 min   Stress: No Stress Concern Present (8/16/2024)    [employer] - Occupational Stress Questionnaire     Feeling of Stress : Only a little   Housing Stability: Unknown (8/16/2024)    Housing Stability Vital Sign     Unable to Pay for Housing in the Last Year: No     No family history on file.    Review of patient's allergies indicates:   Allergen Reactions    Adhesive     Erythromycin     Neosporin (neomycin-polymyx) Rash       Current Outpatient Medications   Medication Sig    acetaminophen (TYLENOL) 500 MG tablet Take 500 mg by mouth once daily.    ALBUTEROL, REFILL, INHL Inhale into the lungs. 2 puffs as needed    allopurinoL (ZYLOPRIM) 100 MG tablet Take 100 mg by mouth once daily.    aspirin (ECOTRIN) 81 MG EC tablet Take 81 mg by mouth once daily.    calcium carbonate (OS-NOBLE) 600  mg calcium (1,500 mg) Tab Take 1,200 mg by mouth once.    clobetasoL (TEMOVATE) 0.05 % external solution Apply 1 mL topically once daily.    clopidogreL (PLAVIX) 75 mg tablet Take 75 mg by mouth once daily.    colchicine (COLCRYS) 0.6 mg tablet Take 0.6 mg by mouth daily as needed.    ergocalciferol, vitamin D2, (VITAMIN D ORAL) Take by mouth once daily.    famotidine (PEPCID) 40 MG tablet Take 40 mg by mouth once daily.    fluorometholone 0.1% (FML) 0.1 % DrpS Place 1 drop into both eyes Daily.    furosemide (LASIX) 20 MG tablet Take 20 mg by mouth 2 (two) times daily.    gabapentin (NEURONTIN) 300 MG capsule Take 1 capsule (300 mg total) by mouth 2 (two) times daily.    ipratropium (ATROVENT) 42 mcg (0.06 %) nasal spray 2 sprays by Each Nostril route as needed.    latanoprost 0.005 % ophthalmic solution Place 1 drop into both eyes every evening.    losartan (COZAAR) 50 MG tablet Take 50 mg by mouth once daily.    metoprolol succinate (TOPROL-XL) 25 MG 24 hr tablet Take 25 mg by mouth once daily.    omega-3 fatty acids/fish oil (FISH OIL-OMEGA-3 FATTY ACIDS) 300-1,000 mg capsule Take by mouth once daily.    rosuvastatin (CRESTOR) 20 MG tablet Take 20 mg by mouth every evening.    traMADoL (ULTRAM) 50 mg tablet Take 50 mg by mouth every 12 (twelve) hours.    vitamin D (VITAMIN D3) 1000 units Tab Take 1,000 Units by mouth once daily.     No current facility-administered medications for this visit.       REVIEW OF SYSTEMS:    GENERAL:  current fevers or chills, recent use of antibiotics denies .  HEENT:  History of migraines/headaches: denies , History of major throat surgery: denies   RESPIRATORY:  History of home oxygen or pulmonary hypertension/severe breathing dysfunction: denies   CARDIOVASCULAR:  Hx of palpitations/rhythm problems: denies  Hx of Heart Attacks/Surgery: denies   GI:  Recent abdominal discomfort or recent change in bowel habits denies   MUSCULOSKELETAL:  See HPI.  SKIN:  unhealed wounds or rashes:  denies   PSYCH: major psychiatric history or recent psychosocial stressors denies   HEMATOLOGY/LYMPHOLOGY:  Hx of prolonged bleeding, Hx of Blood thinner usage: reports plavix and aspirin ; reason: TIA (x2)   NEURO:   history of seizures, strokes, chronic/old weakness (such as paralysis or paresis of any body part): denies   All other reviewed and negative other than HPI.    OBJECTIVE:    BP (!) 150/83   Pulse 74   Temp 98.2 °F (36.8 °C)   Resp 18   Wt 119.2 kg (262 lb 12.6 oz)   SpO2 100%   BMI 41.16 kg/m²     PHYSICAL EXAMINATION:  General appearance: Well appearing, in no acute distress, alert and appropriately communicative.  Psych:  Mood and affect appropriate.  Skin: Skin color, texture, turgor normal, no rashes or lesions, in both upper and lower body for exposed skin.  Head/face:  Atraumatic, normocephalic.  Neck: pain to palpation over the right cervical paraspinous muscles, R>L trapezius, levator scap. Spurling Negative. No pain with neck flexion, or lateral flexion. Pain with neck extension   Cor: regular rate  Pulm: non-labored breathing  GI: Abdomen non-distended and non-tender.  Extremities: No deformities, significant lymphedema, or skin discoloration. No significant open wounds. No major amputations.   Musculoskeletal: Bilateral upper extremity strength is normal and symmetric .  No atrophy or tone abnormalities are noted. TTP of R>L AC joint, supraspinatus muscle, infraspinatus muscle, biceps brachii muscle. Limited active and passive ROM of the right shoulder (flexion 100 degrees, abduction 100 degrees) with significant pain, full active and passive ROM of the left shoulder with pain in all directions. + AC compression, Neers, Niño, Painful Arc BL. Negative Speeds, yergasons.   Neuro: Bilateral upper and lower extremity coordination and muscle stretch reflexes without abnormalities noted.  Marino and/or Clonus: negative; loss of sensation to light touch: negative  Gait: patient presented  to clinic in wheelchair, ambulates with assistance of cane    CMP  Sodium   Date Value Ref Range Status   08/19/2024 144 136 - 145 mmol/L Final     Potassium   Date Value Ref Range Status   08/19/2024 4.1 3.5 - 5.1 mmol/L Final     Chloride   Date Value Ref Range Status   08/19/2024 110 95 - 110 mmol/L Final     CO2   Date Value Ref Range Status   08/19/2024 26 23 - 29 mmol/L Final     Glucose   Date Value Ref Range Status   08/19/2024 94 70 - 110 mg/dL Final     BUN   Date Value Ref Range Status   08/19/2024 10 8 - 23 mg/dL Final     Creatinine   Date Value Ref Range Status   08/19/2024 1.1 0.5 - 1.4 mg/dL Final     Calcium   Date Value Ref Range Status   08/19/2024 10.1 8.7 - 10.5 mg/dL Final     Total Protein   Date Value Ref Range Status   08/19/2024 6.8 6.0 - 8.4 g/dL Final     Albumin   Date Value Ref Range Status   08/19/2024 3.6 3.5 - 5.2 g/dL Final     Total Bilirubin   Date Value Ref Range Status   08/19/2024 0.6 0.1 - 1.0 mg/dL Final     Comment:     For infants and newborns, interpretation of results should be based  on gestational age, weight and in agreement with clinical  observations.    Premature Infant recommended reference ranges:  Up to 24 hours.............<8.0 mg/dL  Up to 48 hours............<12.0 mg/dL  3-5 days..................<15.0 mg/dL  6-29 days.................<15.0 mg/dL       Alkaline Phosphatase   Date Value Ref Range Status   08/19/2024 85 55 - 135 U/L Final     AST   Date Value Ref Range Status   08/19/2024 15 10 - 40 U/L Final     ALT   Date Value Ref Range Status   08/19/2024 8 (L) 10 - 44 U/L Final     Anion Gap   Date Value Ref Range Status   08/19/2024 8 8 - 16 mmol/L Final     eGFR   Date Value Ref Range Status   08/19/2024 52.1 (A) >60 mL/min/1.73 m^2 Final         ASSESSMENT: 74 y.o. year old female with generalized upper body pain, consistent with:    1. Chronic pain disorder  Ambulatory referral/consult to Rheumatology    Ambulatory referral/consult to [facility]      2. Chronic pain of both knees  Ambulatory referral/consult to Pain Clinic      3. Shoulder pain, unspecified chronicity, unspecified laterality  X-Ray Shoulder Complete Bilateral      4. Neck pain  X-Ray Cervical Spine AP And Lateral      5. Rheumatoid arthritis, involving unspecified site, unspecified whether rheumatoid factor present  Ambulatory referral/consult to Rheumatology      6. Other chronic pain  Ambulatory referral/consult to [facility]      7. Myofascial pain        8. Chronic neck pain        9. Chronic pain of both shoulders  X-Ray Shoulder Complete Bilateral      10. Polymyalgia            IMPRESSION: Sandra Lui  presents today for generalized upper body pain. History and physical exam are consistent with polymyalgia, myofascial pain syndrome, and likely OA of the shoulder.  We will continue with conservative management (FRP and non-narcotic medications). If their pain persists despite conservative measures, we will consider interventions in an effort to give them additional relief for their pain.      PLAN:   - I have stressed the importance of physical activity and a home exercise plan to help with pain and improve health.  - Patient can continue with gabapentin for now since they are providing benefits, using them appropriately, and without side effects.  - refill prescription provided for gabapentin 300 mg BID  - recommend tylenol 1000 mg TID PRN  - XR of the shoulders and cervical spine ordered today   - referral placed to the functional restoration program  - external referral placed to rheumatology   - follow up with orthopedics regarding chronic knee pain and arthroplasty   - RTC 3 months to discuss interventions, if applicable at that time.  - Counseled patient regarding the importance of activity modification, constant sleeping habits, and physical therapy.    The above plan and management options were discussed at length with patient. Patient  is in agreement with the above and verbalized understanding. It will be communicated with the referring physician via electronic record, fax, or mail.    Taylor Portillo,   10/07/2024      I have personally reviewed the history and exam of this patient and agree with the student/resident/fellow/NPs note as stated above.  I have seen the patient and personally examined them as appropriate.  I have personally discussed the plan of care with the provider and patient and have reviewed and/or edited the plan of care as appropriate.  All questions have been answered to the best of my ability.    Jose Luis Joyce MD PhD

## 2024-10-08 ENCOUNTER — HOSPITAL ENCOUNTER (OUTPATIENT)
Dept: RADIOLOGY | Facility: HOSPITAL | Age: 74
Discharge: HOME | End: 2024-10-08
Attending: STUDENT IN AN ORGANIZED HEALTH CARE EDUCATION/TRAINING PROGRAM
Payer: MEDICARE

## 2024-10-08 ENCOUNTER — PATIENT MESSAGE (OUTPATIENT)
Dept: INTERNAL MEDICINE | Facility: CLINIC | Age: 74
End: 2024-10-08
Payer: MEDICARE

## 2024-10-08 DIAGNOSIS — M54.2 NECK PAIN: ICD-10-CM

## 2024-10-08 PROCEDURE — 72040 X-RAY EXAM NECK SPINE 2-3 VW: CPT | Mod: TC

## 2024-10-08 PROCEDURE — 72040 X-RAY EXAM NECK SPINE 2-3 VW: CPT | Mod: 26,,, | Performed by: INTERNAL MEDICINE

## 2024-10-08 RX ORDER — CLOPIDOGREL BISULFATE 75 MG/1
75 TABLET ORAL DAILY
Qty: 30 TABLET | Refills: 1 | Status: SHIPPED | OUTPATIENT
Start: 2024-10-08

## 2024-10-08 RX ORDER — FUROSEMIDE 20 MG/1
20 TABLET ORAL 2 TIMES DAILY
Qty: 60 TABLET | Refills: 2 | Status: SHIPPED | OUTPATIENT
Start: 2024-10-08

## 2024-10-08 RX ORDER — GABAPENTIN 300 MG/1
300 CAPSULE ORAL 2 TIMES DAILY
Qty: 30 CAPSULE | Refills: 0 | Status: SHIPPED | OUTPATIENT
Start: 2024-10-08

## 2024-10-08 RX ORDER — LOSARTAN POTASSIUM 50 MG/1
50 TABLET ORAL DAILY
Qty: 30 TABLET | Refills: 2 | Status: SHIPPED | OUTPATIENT
Start: 2024-10-08

## 2024-10-08 RX ORDER — ROSUVASTATIN CALCIUM 20 MG/1
20 TABLET, COATED ORAL NIGHTLY
Qty: 30 TABLET | Refills: 2 | Status: SHIPPED | OUTPATIENT
Start: 2024-10-08

## 2024-10-13 ENCOUNTER — HOSPITAL ENCOUNTER (EMERGENCY)
Facility: HOSPITAL | Age: 74
Discharge: HOME | End: 2024-10-13
Attending: EMERGENCY MEDICINE
Payer: MEDICARE

## 2024-10-13 VITALS
DIASTOLIC BLOOD PRESSURE: 76 MMHG | OXYGEN SATURATION: 96 % | SYSTOLIC BLOOD PRESSURE: 169 MMHG | HEIGHT: 67 IN | RESPIRATION RATE: 15 BRPM | WEIGHT: 262.38 LBS | BODY MASS INDEX: 41.18 KG/M2 | TEMPERATURE: 98 F | HEART RATE: 84 BPM

## 2024-10-13 DIAGNOSIS — R09.81 NASAL CONGESTION: Primary | ICD-10-CM

## 2024-10-13 DIAGNOSIS — J00 ACUTE RHINITIS: ICD-10-CM

## 2024-10-13 PROCEDURE — 99283 EMERGENCY DEPT VISIT LOW MDM: CPT

## 2024-10-13 PROCEDURE — 25000003 PHARM REV CODE 250: Performed by: EMERGENCY MEDICINE

## 2024-10-13 RX ORDER — OXYMETAZOLINE HCL 0.05 %
2 SPRAY, NON-AEROSOL (ML) NASAL
Status: COMPLETED | OUTPATIENT
Start: 2024-10-13 | End: 2024-10-13

## 2024-10-13 RX ORDER — ALBUTEROL SULFATE 90 UG/1
1-2 INHALANT RESPIRATORY (INHALATION) EVERY 6 HOURS PRN
Qty: 18 G | Refills: 1 | Status: SHIPPED | OUTPATIENT
Start: 2024-10-13

## 2024-10-13 RX ORDER — FLUTICASONE PROPIONATE 50 MCG
1 SPRAY, SUSPENSION (ML) NASAL 2 TIMES DAILY PRN
Qty: 16 G | Refills: 1 | Status: SHIPPED | OUTPATIENT
Start: 2024-10-13

## 2024-10-13 RX ADMIN — OXYMETAZOLINE HCL 2 SPRAY: 0.05 SPRAY NASAL at 11:10

## 2024-10-13 NOTE — ED NOTES
Patient identifiers verified and correct for  Ms Lui  C/C:  Nasal congestion SEE NN  APPEARANCE: awake and alert in NAD. PAIN  0/10  SKIN: warm, dry and intact. No breakdown or bruising.  MUSCULOSKELETAL: Patient moving all extremities spontaneously, no obvious swelling or deformities noted. Ambulates independently.  RESPIRATORY: Denies shortness of breath.Respirations unlabored.   CARDIAC: Denies CP, 2+ distal pulses; no peripheral edema  ABDOMEN: S/ND/NT, Denies nausea  : voids spontaneously, denies difficulty  Neurologic: AAO x 4; follows commands equal strength in all extremities; denies numbness/tingling. Denies dizziness  Denies new weknaess

## 2024-10-13 NOTE — ED PROVIDER NOTES
Encounter Date: 10/13/2024       History     Chief Complaint   Patient presents with    Nasal Congestion     Yellow and green mucus per patient x4 days     74-year-old female with a history of asthma and nasal fracture presents with nasal congestion.  Symptoms for 3 days.  Persistent despite over-the-counter medication.  She has never had sinus surgery.  Patient denies nausea, vomiting, diarrhea, fever, cough, shortness of breath, chest pain, abdominal pain, or dysuria.  She has yellow and green discharge.  She has had a nosebleed with this.  The patients available PMH, PSH, Social History, medications, allergies, and triage vital signs were reviewed just prior to their medical evaluation.         Review of patient's allergies indicates:   Allergen Reactions    Adhesive     Erythromycin     Neosporin (neomycin-polymyx) Rash     Past Medical History:   Diagnosis Date    Gout     Hypertension      Past Surgical History:   Procedure Laterality Date    ANKLE SURGERY      CHOLECYSTECTOMY      HYSTERECTOMY      TONSILLECTOMY      TUBAL LIGATION       No family history on file.  Social History     Tobacco Use    Smoking status: Never   Substance Use Topics    Alcohol use: No    Drug use: No     Review of Systems   Constitutional:  Negative for fever.   HENT:  Positive for congestion, nosebleeds, rhinorrhea and sinus pressure. Negative for sore throat, trouble swallowing and voice change.    Respiratory:  Negative for cough and shortness of breath.    Cardiovascular:  Negative for chest pain.   Gastrointestinal:  Negative for abdominal pain, diarrhea, nausea and vomiting.   Genitourinary:  Negative for dysuria.       Physical Exam     Initial Vitals [10/13/24 1037]   BP Pulse Resp Temp SpO2   (!) 169/76 84 15 97.8 °F (36.6 °C) 96 %      MAP       --         Physical Exam    Nursing note and vitals reviewed.  Constitutional: She appears well-developed and well-nourished. She is not diaphoretic. No distress.   HENT:   Head:  Normocephalic and atraumatic. Mouth/Throat: Oropharynx is clear and moist. No oropharyngeal exudate.   Nasal mucosa is inflamed bilaterally, no acute epistaxis, facial bones are nontender   Eyes: Conjunctivae are normal. Right eye exhibits no discharge. Left eye exhibits no discharge.   Neck: Neck supple.   Normal range of motion.  Cardiovascular:  Normal rate, regular rhythm and normal heart sounds.     Exam reveals no gallop and no friction rub.       No murmur heard.  Pulmonary/Chest: Breath sounds normal. No respiratory distress. She has no wheezes. She has no rhonchi. She has no rales.   Abdominal: She exhibits no distension.   Musculoskeletal:         General: No tenderness or edema. Normal range of motion.      Cervical back: Normal range of motion and neck supple.     Neurological: She is alert and oriented to person, place, and time. GCS score is 15. GCS eye subscore is 4. GCS verbal subscore is 5. GCS motor subscore is 6.   Skin: Skin is warm and dry. No rash noted. No erythema.   Psychiatric: She has a normal mood and affect. Her behavior is normal. Judgment and thought content normal.         ED Course   Procedures  Labs Reviewed - No data to display       Imaging Results    None          Medications   oxymetazoline 0.05 % nasal spray 2 spray (2 sprays Each Nostril Given 10/13/24 1102)     Medical Decision Making  74-year-old female presents with rhinitis.  Vitals with hypertension.  Physical exam as above.  No indication for labs or imaging.  Will treat with Afrin and Flonase.  No wheezing today.  I refilled her albuterol MDI out of convenience.  Provided spacer.  No indication for antibiotics.  Patient will call their primary physician tomorrow to schedule close follow-up. Patient will return to ED for worsening symptoms, inability to eat/drink, fever greater than 100.4, or any other concerns. Did bedside teaching with return precautions.  All questions answered.  The patient acknowledges  understanding.  Gave written and verbal discharge instructions.     Risk  OTC drugs.  Prescription drug management.                                      Clinical Impression:  Final diagnoses:  [R09.81] Nasal congestion (Primary)  [J00] Acute rhinitis          ED Disposition Condition    Discharge Stable          ED Prescriptions       Medication Sig Dispense Start Date End Date Auth. Provider    fluticasone propionate (FLONASE) 50 mcg/actuation nasal spray 1 spray (50 mcg total) by Each Nostril route 2 (two) times daily as needed for Rhinitis. 16 g 10/13/2024 -- Mj Mesa MD    albuterol (PROVENTIL/VENTOLIN HFA) 90 mcg/actuation inhaler Inhale 1-2 puffs into the lungs every 6 (six) hours as needed for Wheezing or Shortness of Breath. Rescue 18 g 10/13/2024 -- Mj Mesa MD          Follow-up Information       Follow up With Specialties Details Why Contact Info    Follow up with primary physician as soon as possible.  Call tomorrow for an appointment.        [facility] Emergency Medicine  Return to ED for worsening symptoms, inability to eat/drink, fever greater than 100.4, or any other concerns. [address] [facility]  [facility] [address] [phone]             Mj Mesa MD  10/13/24 1118

## 2024-10-13 NOTE — DISCHARGE INSTRUCTIONS
Use afrin as directed on packaging for three days.    Our goal in the emergency department is to always give you outstanding care and exceptional service. You may receive a survey by mail or e-mail in the next week regarding your experience in our ED. We would greatly appreciate your completing and returning the survey. Your feedback provides us with a way to recognize our staff who give very good care and it helps us learn how to improve when your experience was below our aspiration of excellence.

## 2024-10-14 ENCOUNTER — TELEPHONE (OUTPATIENT)
Dept: ORTHOPEDICS | Facility: CLINIC | Age: 74
End: 2024-10-14
Payer: MEDICARE

## 2024-10-14 NOTE — TELEPHONE ENCOUNTER
"Called pt and and stated that got  her message about the virtual joint class this morning having issues and I have sent her the class information privately and she will reach out to me with any questions she may have. Pt verbalized understanding and has no further questions.    ----- Message from Prince Lombardo sent at 10/14/2024  9:26 AM CDT -----  Regarding: FW: pt shana  Contact: [phone]    ----- Message -----  From: Britt Horn  Sent: 10/14/2024   9:21 AM CDT  To: [facility] Clinical Support  Subject: pt adivce                                        .Name Of Caller: Self     Contact Preference?:[phone]     What is the nature of the call?: calling in regards to rescheduling virtual joint class, pls call     Additional Notes:  "Thank you for all that you do for our patients"  "

## 2024-10-14 NOTE — TELEPHONE ENCOUNTER
Called pt and discussed that I will send her the joint information and she can reach out to me via the patient portal with any questions she may have. Pt verbalized understanding and has no further questions.     ----- Message from Med Assistant Lombardo sent at 10/14/2024 10:22 AM CDT -----  Regarding: FW: missed appt  Contact: @ [phone]    ----- Message -----  From: Iris Kelly  Sent: 10/14/2024   9:43 AM CDT  To: Nehemias Escobar RN; #  Subject: missed appt                                      Pt is calling to reschedule appointment on today per the patient she was having trouble logging on this morning  ...no available dates Please call and adv @ [phone]

## 2024-10-15 ENCOUNTER — TELEPHONE (OUTPATIENT)
Dept: SPORTS MEDICINE | Facility: CLINIC | Age: 74
End: 2024-10-15
Payer: MEDICARE

## 2024-10-15 NOTE — TELEPHONE ENCOUNTER
Called and left voicemail for patient in regards to setting up virtual visit with Dr. Dangelo to discuss Iovera.

## 2024-10-16 ENCOUNTER — CLINICAL SUPPORT (OUTPATIENT)
Dept: REHABILITATION | Facility: HOSPITAL | Age: 74
End: 2024-10-16
Attending: ORTHOPAEDIC SURGERY
Payer: MEDICARE

## 2024-10-16 DIAGNOSIS — M25.661 DECREASED RANGE OF MOTION (ROM) OF RIGHT KNEE: Primary | ICD-10-CM

## 2024-10-16 PROCEDURE — 97161 PT EVAL LOW COMPLEX 20 MIN: CPT

## 2024-10-16 PROCEDURE — 97112 NEUROMUSCULAR REEDUCATION: CPT

## 2024-10-16 NOTE — PLAN OF CARE
"[facility]   Physical Therapy Initial Evaluation      Name: Sandra Lui  Clinic Number: [id number]    Therapy Diagnosis:   Encounter Diagnosis   Name Primary?    Decreased range of motion (ROM) of right knee Yes        Physician: Sha Duran MD    Physician Orders: PT Eval and Treat   Medical Diagnosis from Referral: M17.11 (ICD-10-CM) - Unilateral primary osteoarthritis, right knee   Evaluation Date: 10/16/2024  Authorization Period Expiration: 9/30/2024  Plan of Care Expiration: 11/1/2024  Progress Note Due: 10th visit  Visit # / Visits authorized: 1/ 1   FOTO: 1/1    Precautions: Standard and Diabetes     Time In: 8:06 am  Time Out: 8:59 am  Total Billable Time: 53 minutes    Subjective     Date of onset: chronic    History of current condition - Pat reports: she has been long over due for a R TKA. Reports she will eventually have a L TKA too.     Falls: January 2024; fell at [employer] after tripping on a rug. Developed a concussion and broke her nose    Imaging: please see imaging    Prior Therapy: yes, "many times"  Social History:  lives alone, but will have her daughter around post op  Occupation: retired hospice nurse; 4 steps to enter home with B hand rails  Prior Level of Function: independent  Current Level of Function: difficulty walking short and long distances, difficulty ambulating stairs, notes ascending stairs is more difficult than descending, intermediate use of SPC due to reports of R Knee buckling    Pain:  Current 7/10, worst 10/10, best 7/10   Location: right knee    Description: Aching, Burning, Sharp, and Shooting  Aggravating Factors: Sitting, Standing, Walking, and Getting out of bed/chair  Easing Factors: pain medication and elevation    Patients goals: improve functional mobility      Medical History:   Past Medical History:   Diagnosis Date    Gout     Hypertension        Surgical History:   Sandra Lui  has a past surgical history that includes " "Tubal ligation; Hysterectomy; Tonsillectomy; Cholecystectomy; and Ankle surgery.    Medications:   Sandra SWENSON has a current medication list which includes the following prescription(s): acetaminophen, albuterol, albuterol, allopurinol, aspirin, calcium carbonate, clobetasol, clopidogrel, colchicine, ergocalciferol (vitamin d2), famotidine, fluorometholone 0.1%, fluticasone propionate, furosemide, gabapentin, ipratropium, latanoprost, losartan, metoprolol succinate, fish oil-omega-3 fatty acids, rosuvastatin, tramadol, and vitamin d.    Allergies:   Review of patient's allergies indicates:   Allergen Reactions    Adhesive     Erythromycin     Neosporin (neomycin-polymyx) Rash        Objective      Posture: rounded shoulders and forward head  Joint mobility/palpation: significant hypomobility during patella mobilizations in inferior and superior directions    Active range of motion:  Right knee: 2-105  Left knee: 2-112    Passive range of motion:  Right knee: 0-110  Left knee: 2-115      Lower extremity manual muscle test Right Left Pain/dysfunction with movement   Hip flexion 4+/5 4+/5    Hip extension 3+/5 3+/5    Hip abduction 3+/5 3+/5    Knee flexion 4-/5 4-/5 Right knee pain   Knee extension 4/5 4/5 Right knee pain     Timed Up and Go:  20 seconds with SPC     30" Chair Stand: 5x with upper extremity support     Gait Analysis: Modified independent with SPC, decreased gait speed, shortened step length    Impairment/Limitation/Restriction for KOOS JR. Score on FOTO Knee Survey    Therapist reviewed KOOS scores for Sandra Lui on 10/16/2024.   KOOS documents entered into [employer] - see Media section.    Limitation Score: see media section    Limitation for Total FOTO Knee Survey  Limitation Score: see media section       Treatment     Total Treatment time (time-based codes) separate from Evaluation: 25 minutes     Amanda received the treatments listed below:      neuromuscular re-education activities to improve: " Proprioception and motor control and patient education for 25 minutes. The following activities were included:  Patient education  - HEP  - plan of care  -prognosis  -importance of range of motion for proper gait cycle and functional mobility    Hamstring inhibition with heel prop 30 seconds, 3x  Heel prop for full quad activation: 3 minutes  Quad sets with towel under knee 5 second, 15x  Short arc quads with medium bolster 5 seconds, 15x  Short arc qauds with small bolster 5 seconds, 15x  Heel slides for normal gait mechanics      Patient Education and Home Exercises       Education provided: see above    Written Home Exercises Provided: yes. Exercises were reviewed and Pat was able to demonstrate them prior to the end of the session.  Pat demonstrated good  understanding of the education provided. See EMR under Patient Instructions for exercises provided during therapy sessions.    Assessment     Sandra SWENSON is a 74 y.o. female referred to outpatient Physical Therapy with a medical diagnosis of M17.11 (ICD-10-CM) - Unilateral primary osteoarthritis, right knee . Patient presents to initial evaluation for prehabiltation in anticipation of R total knee arthroplasty scheduled for 11/5/2024. Patient presents with chronic R knee pain, reduced strength and poor motor control of R quadriceps, and reduced overall lower extremity strength. Patient would benefit from skilled outpatient physical therapy to address quadriceps motor control and lower extremity strength deficits so that she may return to full independence with all household and personal ADL's, and improve overall functional mobility prior to surgery.     Patient prognosis is Fair.   Patient will benefit from skilled outpatient Physical Therapy to address the deficits stated above and in the chart below, provide patient /family education, and to maximize patientt's level of independence.     Plan of care discussed with patient: Yes  Patient's spiritual, cultural  and educational needs considered and patient is agreeable to the plan of care and goals as stated below:     Anticipated Barriers for therapy: chronicity, age,    Medical Necessity is demonstrated by the following  History  Co-morbidities and personal factors that may impact the plan of care [] LOW: no personal factors / co-morbidities  [x] MODERATE: 1-2 personal factors / co-morbidities  [] HIGH: 3+ personal factors / co-morbidities    Moderate / High Support Documentation:   Co-morbidities affecting plan of care: see medical chart    Personal Factors:   age     Examination  Body Structures and Functions, activity limitations and participation restrictions that may impact the plan of care [] LOW: addressing 1-2 elements  [] MODERATE: 3+ elements  [x] HIGH: 4+ elements (please support below)    Moderate / High Support Documentation: Limitations with prolonged walking, difficulty with ADLs, decreased range of motion, decreased strength, difficulty with gait, neuromuscular re-education, and pain.       Clinical Presentation [x] LOW: stable  [] MODERATE: Evolving  [] HIGH: Unstable     Decision Making/ Complexity Score: low       Goals:  Long Term Goals (3 Weeks):   1. Pt will be independent with HEP to supplement physical therapy treatment in improving functional status.   2. Pt will demonstrate improved impaired lower extremity strength by 1/2 grade to promote functional mobility.   3. Patient will require <2 verbal and tactile cue to engage quadricep without compensation to demonstrate improved quadriceps control and awareness.          Plan     Plan of care Certification: 10/16/2024 to 11/1/2024.    Outpatient Physical Therapy 3-4 times weekly for 6 weeks to include the following interventions: Aquatic Therapy, Cervical/Lumbar Traction, Electrical Stimulation TENS, Gait Training, Manual Therapy, Moist Heat/ Ice, Neuromuscular Re-ed, Orthotic Management and Training, Patient Education, Self Care, Therapeutic  Activities, Therapeutic Exercise, and functional dry needling  .     Yessi Echevarria, PT, DPT, OCS

## 2024-10-17 ENCOUNTER — TELEPHONE (OUTPATIENT)
Dept: SPORTS MEDICINE | Facility: CLINIC | Age: 74
End: 2024-10-17
Payer: MEDICARE

## 2024-10-17 NOTE — TELEPHONE ENCOUNTER
----- Message from Vanda sent at 10/17/2024  7:55 AM CDT -----       Nature of Call: returning a missed call    Best Call Back Number: [phone]     Additional Information:  ERIN fortune regarding Patient Advice for a missed call from Roxane, please call back to assist please call back

## 2024-10-17 NOTE — PROGRESS NOTES
[facility]   Physical Therapy Treatment Note     Name: Sandra Lui  [facility] Number: [id number]    Therapy Diagnosis:   Encounter Diagnosis   Name Primary?    Decreased range of motion (ROM) of right knee Yes     Physician: Sha Duran MD    Visit Date: 10/18/2024    Physician Orders: PT Eval and Treat   Medical Diagnosis from Referral: M17.11 (ICD-10-CM) - Unilateral primary osteoarthritis, right knee   Evaluation Date: 10/16/2024  Authorization Period Expiration: 12/31/2024  Plan of Care Expiration: 11/1/2024  Progress Note Due: 10th visit  Visit # / Visits authorized: 1 / 25 + eval  FOTO: 1/1    PTA Visit #: 1 / 5     Time In: 0758  Time Out: 0900  Total Treatment Time: 62 minutes  Total Billable Time: 54 minutes (3 NMR, 1 TE)    Precautions: Standard and Diabetes     SUBJECTIVE     Patient reports: she felt good after previous session, however also endorses soreness. She notes having pain, but it is not as intense as previous.  She was compliant with home exercise program.  Response to previous treatment: appropriate muscle response  Functional change: ongoing    Pain: 6-7/10, currently  Location: Right knee    Patient goals: improve functional mobility     OBJECTIVE     Objective Measures updated at progress report unless specified.     Treatment     Pat received the treatments listed below:      therapeutic exercises to develop strength, endurance, ROM, and flexibility for 8 minutes including:  Heel prop; 3 minutes  Long sitting hamstring stretch with heel prop; 30 second hold, 3 reps  Heel slides; 10 second hold, 5 minutes - NP    manual therapy techniques: Joint mobilizations were applied to the: Right knee for 8 minutes, including:   Tibiofemoral joint distraction at EOM  AP mobs in seated    neuromuscular re-education activities to improve: Balance, Coordination, Kinesthetic, Sense, and motor control for 38 minutes. The following activities were included:  Quad sets  with towel roll; 10 second hold, 5 minutes  Short arc quads with medium bolster; 10 second hold, 5 minutes  Short arc qauds with small bolster; 10 second hold, 5 minutes - use of strap  Long arc quads at EOM; 10 second hold, 5 minutes    therapeutic activities to improve functional performance for 10 minutes, including:  Nustep for endogenous release of opioids; 10 minutes, Level 1    Cold pack to Right knee for 8 minutes post session.    Patient Education and Home Exercises     Home Exercises Provided and Patient Education Provided     Education provided:   - compliance with HEP  - plan of care  - prognosis  - importance of range of motion for proper gait cycle and functional mobility    Written Home Exercises Provided: Patient instructed to cont prior HEP. Exercises were reviewed and Pat was able to demonstrate them prior to the end of the session.  Pat demonstrated good  understanding of the education provided. See EMR under Patient Instructions for exercises provided during therapy sessions    ASSESSMENT     Pat presents for first follow up after initial evaluation. She demonstrates fair tolerance to therapeutic interventions noting adequate muscle response throughout. There is poor quad activation observed at this time; she would benefit from use of NMES in future sessions. Unable to complete today secondary to pants donned. Mod/max cueing for quadriceps isolation and activation with quad sets. She notes difficulty with short arc quads on half foam requiring use of strap for assistance with lifting. Performed manual care with good response and improvement in subjective pain complaints. Will continue per POC towards treatment goals.  Pat Is progressing well towards her goals.   Pt prognosis is Fair.     Pt will continue to benefit from skilled outpatient physical therapy to address the deficits listed in the problem list box on initial evaluation, provide pt/family education and to maximize pt's level of  independence in the home and community environment.     Pt's spiritual, cultural and educational needs considered and pt agreeable to plan of care and goals.     Anticipated barriers to physical therapy: chronicity, age    Goals:   Long Term Goals (3 Weeks): - Appropriate, ongoing  1. Pt will be independent with HEP to supplement physical therapy treatment in improving functional status.   2. Pt will demonstrate improved impaired lower extremity strength by 1/2 grade to promote functional mobility.   3. Patient will require <2 verbal and tactile cue to engage quadricep without compensation to demonstrate improved quadriceps control and awareness.     PLAN     Plan of Care Certification: 10/16/2024 to 11/1/2024.     Outpatient Physical Therapy 3-4 times weekly for 6 weeks to include the following interventions: Aquatic Therapy, Cervical/Lumbar Traction, Electrical Stimulation TENS, Gait Training, Manual Therapy, Moist Heat/ Ice, Neuromuscular Re-ed, Orthotic Management and Training, Patient Education, Self Care, Therapeutic Activities, Therapeutic Exercise, and functional dry needling    PT/PTA met face to face to discuss patient's treatment plan and progress towards established goals. Patient will be seen by physical therapist every sixth visit and minimally once per month.    Cristy Cordova PTA

## 2024-10-17 NOTE — TELEPHONE ENCOUNTER
----- Message from Vanda sent at 10/17/2024  7:55 AM CDT -----       Nature of Call: returning a missed call    Best Call Back Number: [phone]     Additional Information:  ERIN fortune regarding Patient Advice for a missed call from Roxane, please call back to assist please call back

## 2024-10-18 ENCOUNTER — TELEPHONE (OUTPATIENT)
Dept: ADMINISTRATIVE | Facility: OTHER | Age: 74
End: 2024-10-18
Payer: MEDICARE

## 2024-10-18 ENCOUNTER — CLINICAL SUPPORT (OUTPATIENT)
Dept: REHABILITATION | Facility: HOSPITAL | Age: 74
End: 2024-10-18
Payer: MEDICARE

## 2024-10-18 DIAGNOSIS — M25.661 DECREASED RANGE OF MOTION (ROM) OF RIGHT KNEE: Primary | ICD-10-CM

## 2024-10-18 PROCEDURE — 97110 THERAPEUTIC EXERCISES: CPT | Mod: CQ

## 2024-10-18 PROCEDURE — 97112 NEUROMUSCULAR REEDUCATION: CPT | Mod: CQ

## 2024-10-18 NOTE — TELEPHONE ENCOUNTER
Spoke to patient who declined rescheduling Functional Restoration of 10- as she has upcoming knee surgery in November. States she will call when ready to reschedule.

## 2024-10-21 ENCOUNTER — HOSPITAL ENCOUNTER (OUTPATIENT)
Dept: PREADMISSION TESTING | Facility: HOSPITAL | Age: 74
Discharge: HOME | End: 2024-10-21
Attending: ORTHOPAEDIC SURGERY
Payer: MEDICARE

## 2024-10-21 ENCOUNTER — OFFICE VISIT (OUTPATIENT)
Dept: ORTHOPEDICS | Facility: CLINIC | Age: 74
End: 2024-10-21
Payer: MEDICARE

## 2024-10-21 ENCOUNTER — HOSPITAL ENCOUNTER (OUTPATIENT)
Dept: RADIOLOGY | Facility: HOSPITAL | Age: 74
Discharge: HOME | End: 2024-10-21
Attending: ORTHOPAEDIC SURGERY
Payer: MEDICARE

## 2024-10-21 ENCOUNTER — HOSPITAL ENCOUNTER (OUTPATIENT)
Dept: RADIOLOGY | Facility: HOSPITAL | Age: 74
Discharge: HOME | End: 2024-10-21
Payer: MEDICARE

## 2024-10-21 ENCOUNTER — ANESTHESIA EVENT (OUTPATIENT)
Dept: SURGERY | Facility: HOSPITAL | Age: 74
End: 2024-10-21
Payer: MEDICARE

## 2024-10-21 ENCOUNTER — TELEPHONE (OUTPATIENT)
Dept: INTERNAL MEDICINE | Facility: CLINIC | Age: 74
End: 2024-10-21
Payer: MEDICARE

## 2024-10-21 VITALS
TEMPERATURE: 98 F | OXYGEN SATURATION: 97 % | DIASTOLIC BLOOD PRESSURE: 76 MMHG | HEART RATE: 67 BPM | HEIGHT: 67 IN | WEIGHT: 263.75 LBS | BODY MASS INDEX: 41.39 KG/M2 | SYSTOLIC BLOOD PRESSURE: 170 MMHG

## 2024-10-21 DIAGNOSIS — M25.559 HIP PAIN, UNSPECIFIED LATERALITY: ICD-10-CM

## 2024-10-21 DIAGNOSIS — M17.11 PRIMARY OSTEOARTHRITIS OF RIGHT KNEE: ICD-10-CM

## 2024-10-21 DIAGNOSIS — M17.11 PRIMARY OSTEOARTHRITIS OF RIGHT KNEE: Primary | ICD-10-CM

## 2024-10-21 PROCEDURE — 99214 OFFICE O/P EST MOD 30 MIN: CPT | Mod: S$GLB,,,

## 2024-10-21 PROCEDURE — 73521 X-RAY EXAM HIPS BI 2 VIEWS: CPT | Mod: TC

## 2024-10-21 PROCEDURE — 73521 X-RAY EXAM HIPS BI 2 VIEWS: CPT | Mod: 26,,, | Performed by: RADIOLOGY

## 2024-10-21 PROCEDURE — 99999 PR PBB SHADOW E&M-EST. PATIENT-LVL IV: CPT | Mod: PBBFAC,,,

## 2024-10-21 PROCEDURE — 73700 CT LOWER EXTREMITY W/O DYE: CPT | Mod: 26,RT,, | Performed by: RADIOLOGY

## 2024-10-21 PROCEDURE — 1159F MED LIST DOCD IN RCRD: CPT | Mod: CPTII,S$GLB,,

## 2024-10-21 PROCEDURE — 3288F FALL RISK ASSESSMENT DOCD: CPT | Mod: CPTII,S$GLB,,

## 2024-10-21 PROCEDURE — 1126F AMNT PAIN NOTED NONE PRSNT: CPT | Mod: CPTII,S$GLB,,

## 2024-10-21 PROCEDURE — 73700 CT LOWER EXTREMITY W/O DYE: CPT | Mod: TC,RT

## 2024-10-21 PROCEDURE — 1160F RVW MEDS BY RX/DR IN RCRD: CPT | Mod: CPTII,S$GLB,,

## 2024-10-21 PROCEDURE — 1101F PT FALLS ASSESS-DOCD LE1/YR: CPT | Mod: CPTII,S$GLB,,

## 2024-10-21 RX ORDER — NALOXONE HCL 0.4 MG/ML
0.02 VIAL (ML) INJECTION
OUTPATIENT
Start: 2024-10-21

## 2024-10-21 RX ORDER — SODIUM CHLORIDE 9 MG/ML
INJECTION, SOLUTION INTRAVENOUS CONTINUOUS
OUTPATIENT
Start: 2024-10-21 | End: 2024-10-22

## 2024-10-21 RX ORDER — ONDANSETRON HYDROCHLORIDE 2 MG/ML
4 INJECTION, SOLUTION INTRAVENOUS EVERY 8 HOURS PRN
OUTPATIENT
Start: 2024-10-21

## 2024-10-21 RX ORDER — OXYCODONE HYDROCHLORIDE 5 MG/1
10 TABLET ORAL
OUTPATIENT
Start: 2024-10-21

## 2024-10-21 RX ORDER — PREGABALIN 75 MG/1
75 CAPSULE ORAL
OUTPATIENT
Start: 2024-10-21

## 2024-10-21 RX ORDER — LIDOCAINE HYDROCHLORIDE 10 MG/ML
1 INJECTION, SOLUTION EPIDURAL; INFILTRATION; INTRACAUDAL; PERINEURAL
OUTPATIENT
Start: 2024-10-21

## 2024-10-21 RX ORDER — FAMOTIDINE 20 MG/1
20 TABLET, FILM COATED ORAL 2 TIMES DAILY
OUTPATIENT
Start: 2024-10-21

## 2024-10-21 RX ORDER — MUPIROCIN 20 MG/G
1 OINTMENT TOPICAL
OUTPATIENT
Start: 2024-10-21

## 2024-10-21 RX ORDER — SODIUM CHLORIDE 9 MG/ML
INJECTION, SOLUTION INTRAVENOUS
OUTPATIENT
Start: 2024-10-21

## 2024-10-21 RX ORDER — BISACODYL 10 MG/1
10 SUPPOSITORY RECTAL EVERY 12 HOURS PRN
OUTPATIENT
Start: 2024-10-21

## 2024-10-21 RX ORDER — SODIUM CHLORIDE 0.9 % (FLUSH) 0.9 %
10 SYRINGE (ML) INJECTION
OUTPATIENT
Start: 2024-10-21

## 2024-10-21 RX ORDER — ASPIRIN 81 MG/1
81 TABLET ORAL 2 TIMES DAILY
OUTPATIENT
Start: 2024-10-21

## 2024-10-21 RX ORDER — METHOCARBAMOL 750 MG/1
750 TABLET, FILM COATED ORAL 3 TIMES DAILY
OUTPATIENT
Start: 2024-10-21

## 2024-10-21 RX ORDER — ACETAMINOPHEN 500 MG
1000 TABLET ORAL EVERY 6 HOURS
OUTPATIENT
Start: 2024-10-21

## 2024-10-21 RX ORDER — OXYCODONE HYDROCHLORIDE 5 MG/1
5 TABLET ORAL
OUTPATIENT
Start: 2024-10-21

## 2024-10-21 RX ORDER — ACETAMINOPHEN 500 MG
1000 TABLET ORAL
OUTPATIENT
Start: 2024-10-21

## 2024-10-21 RX ORDER — PROCHLORPERAZINE EDISYLATE 5 MG/ML
5 INJECTION INTRAMUSCULAR; INTRAVENOUS EVERY 6 HOURS PRN
OUTPATIENT
Start: 2024-10-21

## 2024-10-21 RX ORDER — AMOXICILLIN 250 MG
1 CAPSULE ORAL 2 TIMES DAILY
OUTPATIENT
Start: 2024-10-21

## 2024-10-21 RX ORDER — FENTANYL CITRATE 50 UG/ML
100 INJECTION, SOLUTION INTRAMUSCULAR; INTRAVENOUS
OUTPATIENT
Start: 2024-10-21 | End: 2024-10-22

## 2024-10-21 RX ORDER — PREGABALIN 75 MG/1
75 CAPSULE ORAL NIGHTLY
OUTPATIENT
Start: 2024-10-21

## 2024-10-21 RX ORDER — MIDAZOLAM HYDROCHLORIDE 1 MG/ML
4 INJECTION, SOLUTION INTRAMUSCULAR; INTRAVENOUS
OUTPATIENT
Start: 2024-10-21 | End: 2024-10-22

## 2024-10-21 RX ORDER — POLYETHYLENE GLYCOL 3350 17 G/17G
17 POWDER, FOR SOLUTION ORAL DAILY
OUTPATIENT
Start: 2024-10-22

## 2024-10-21 NOTE — H&P (VIEW-ONLY)
CC:  Right knee pain    Sandra Lui is a 74 y.o. female with history of Right knee pain. Pain is worse with activity and weight bearing.  Patient has experienced interference of activities of daily living due to decreased range of motion and an increase in joint pain and swelling.  Patient has failed non-operative treatment including NSAIDs, corticosteroid injections, viscosupplement injections, and activity modification.  Sandra Lui currently ambulates using assistive device .     Relevant medical conditions of significance in perioperative period:  History of TIA- on medication managed by PCP  HTN- on medication managed by PCP  HLD- on medication managed by PCP  CKD 3a- monitored by PCP       Given documented medical comorbidities including those listed above and based off of CMS criteria patient would meet inpatient admission status guidelines. This case has been approved as inpatient.     Past Medical History:   Diagnosis Date    Gout     Hypertension        Past Surgical History:   Procedure Laterality Date    ANKLE SURGERY      CHOLECYSTECTOMY      HYSTERECTOMY      TONSILLECTOMY      TUBAL LIGATION         No family history on file.    Review of patient's allergies indicates:   Allergen Reactions    Adhesive     Erythromycin     Neosporin (neomycin-polymyx) Rash         Current Outpatient Medications:     acetaminophen (TYLENOL) 500 MG tablet, Take 500 mg by mouth once daily., Disp: , Rfl:     albuterol (PROVENTIL/VENTOLIN HFA) 90 mcg/actuation inhaler, Inhale 1-2 puffs into the lungs every 6 (six) hours as needed for Wheezing or Shortness of Breath. Rescue, Disp: 18 g, Rfl: 1    ALBUTEROL, REFILL, INHL, Inhale into the lungs. 2 puffs as needed, Disp: , Rfl:     allopurinoL (ZYLOPRIM) 100 MG tablet, Take 100 mg by mouth once daily., Disp: , Rfl:     aspirin (ECOTRIN) 81 MG EC tablet, Take 81 mg by mouth once daily., Disp: , Rfl:     calcium carbonate (OS-NOBLE) 600 mg calcium (1,500 mg) Tab, Take 1,200  mg by mouth once., Disp: , Rfl:     clobetasoL (TEMOVATE) 0.05 % external solution, Apply 1 mL topically once daily., Disp: , Rfl:     clopidogreL (PLAVIX) 75 mg tablet, Take 1 tablet (75 mg total) by mouth once daily., Disp: 30 tablet, Rfl: 1    colchicine (COLCRYS) 0.6 mg tablet, Take 0.6 mg by mouth daily as needed., Disp: , Rfl:     ergocalciferol, vitamin D2, (VITAMIN D ORAL), Take by mouth once daily., Disp: , Rfl:     famotidine (PEPCID) 40 MG tablet, Take 40 mg by mouth once daily., Disp: , Rfl:     fluorometholone 0.1% (FML) 0.1 % DrpS, Place 1 drop into both eyes Daily., Disp: , Rfl:     fluticasone propionate (FLONASE) 50 mcg/actuation nasal spray, 1 spray (50 mcg total) by Each Nostril route 2 (two) times daily as needed for Rhinitis., Disp: 16 g, Rfl: 1    furosemide (LASIX) 20 MG tablet, Take 1 tablet (20 mg total) by mouth 2 (two) times daily., Disp: 60 tablet, Rfl: 2    gabapentin (NEURONTIN) 300 MG capsule, Take 1 capsule (300 mg total) by mouth 2 (two) times daily., Disp: 30 capsule, Rfl: 0    ipratropium (ATROVENT) 42 mcg (0.06 %) nasal spray, 2 sprays by Each Nostril route as needed., Disp: , Rfl:     latanoprost 0.005 % ophthalmic solution, Place 1 drop into both eyes every evening., Disp: , Rfl:     losartan (COZAAR) 50 MG tablet, Take 1 tablet (50 mg total) by mouth once daily., Disp: 30 tablet, Rfl: 2    metoprolol succinate (TOPROL-XL) 25 MG 24 hr tablet, Take 25 mg by mouth once daily., Disp: , Rfl:     omega-3 fatty acids/fish oil (FISH OIL-OMEGA-3 FATTY ACIDS) 300-1,000 mg capsule, Take by mouth once daily., Disp: , Rfl:     rosuvastatin (CRESTOR) 20 MG tablet, Take 1 tablet (20 mg total) by mouth every evening., Disp: 30 tablet, Rfl: 2    traMADoL (ULTRAM) 50 mg tablet, Take 50 mg by mouth every 12 (twelve) hours., Disp: , Rfl:     vitamin D (VITAMIN D3) 1000 units Tab, Take 1,000 Units by mouth once daily., Disp: , Rfl:     Review of Systems:  Constitutional: no fever or chills  Eyes:  no visual changes  ENT: no nasal congestion or sore throat  Respiratory: no cough or shortness of breath  Cardiovascular: no chest pain or palpitations  Gastrointestinal: no nausea or vomiting, tolerating diet  Genitourinary: no hematuria or dysuria  Integument/Breast: no rash or pruritis  Hematologic/Lymphatic: no easy bruising or lymphadenopathy  Musculoskeletal: positive for knee pain  Neurological: no seizures or tremors  Behavioral/Psych: no auditory or visual hallucinations  Endocrine: no heat or cold intolerance    PE:  There were no vitals taken for this visit.  General: Pleasant, cooperative, NAD   Gait: antalgic  HEENT: NCAT, sclera nonicteric   Lungs: Respirations clear bilaterally; equal and unlabored.   CV: S1S2; 2+ bilateral upper and lower extremity pulses.   Skin: Intact throughout with no rashes, erythema, or lesions  Extremities: No LE edema,  no erythema or warmth of the skin in either lower extremity.    Right knee exam:  Knee Range of Motion:  0-95, pain with passive range of motion  Effusion:  Mild  Condition of skin:intact  Location of tenderness:Medial joint line   Strength:normal  Stability: stable to testing    Hip Examination: painless PROM of hip     Radiographs: Radiographs reveal advanced degenerative changes including subchondral cyst formation, subchondral sclerosis, osteophyte formation, joint space narrowing.     Knee Alignment: normal    Diagnosis: Primary osteoarthritis Right knee    Plan: Right total knee arthroplasty    Due to the serious nature of total joint infection and the high prevalence of community acquired MRSA, vancomycin will be used perioperatively.

## 2024-10-21 NOTE — PROGRESS NOTES
Sandra Lui is a 74 y.o. year old here today for pre surgery optimization visit  in preparation for a Right total knee arthroplasty to be performed by Dr. Duran on 11/5/24.  she was last seen and treated in the clinic on 9/30/2024. she will be medically optimized by the pre op center. There has been no significant change in medical status since last visit. No fever, chills, malaise, or unexplained weight change.      Allergies, Medications, past medical and surgical history reviewed.    Focused examination performed.    Patient did not request to see surgeon in clinic today. All questions answered. Patient encouraged to call with questions. Contact information given.     Pre, abby, and post operative procedures and expectations discussed. Goals of successful surgery reviewed and include manageable pain levels, surgical site free of infection, medication management, and ambulation with PT/OT assistance. Healthy weight management discussed with patient and caregiver who were receptive to eduction of healthy diet and activity. No other necessary lifestyle changes identified. Educated patient about signs and symptoms of infection, medication management, anticoagulation therapy, risk of tobacco and alcohol use, and self-care to promote healing. Surgical guide given for future reference. Hibiclens given to patient with instructions. All questions were answered.     Sandra Lui verbalized an understanding to the education and goals. Patient has displayed readiness to engage in care and is ready to proceed with surgery.  Patient reports daughter and sister are able and ready to provide assistance at home after discharge.    Surgical and blood consents signed.    DME will be arranged. The mobility limitation cannot be sufficiently resolved by the use of a cane. Patient's functional mobility deficit can be sufficiently resolved with the use of a (Rolling Walker or Walker). Patient's mobility limitation  "significantly impairs their ability to participate in one of more activities of daily living. The use of a (Rolling Walker or Walker) will significantly improve the patient's ability to participate in MRADLS and the patient will use it on regular basis in the home."     Sandra Lui will contact us if there are any questions, concerns, or changes in medical status prior to surgery.       Joint class:  10/14/2024    Patient has discussed discharge planning with surgeon. Patient will be discharged to home following surgery.   patient will be scheduled with [facility]. PT will be done at the [place] location.    30 minutes of time was spent on patient education, review of records, templating, H&P, , appointment scheduling and optimizing patient for surgery.      "

## 2024-10-21 NOTE — TELEPHONE ENCOUNTER
----- Message from RN Nurse sent at 10/21/2024 11:26 AM CDT -----  Regarding: unresolved URI  Good morning.  Fyi:  Above patient, who is scheduled for Right TKA on 11/5/2024, completed preop anesthesia visit this morning. During her visit, she disclosed unresolved URI.  Patient visited ED on 10/13/2024 and was prescribed Flonase and Inhaler.  Antibiotics were not prescribed.    Anesthesia requests coordination with patient directly for any further outpatient appointments.    Please advise.  Kathleen Avalos, RN  Anesthesia Clinician

## 2024-10-21 NOTE — DISCHARGE INSTRUCTIONS
Your surgery has been scheduled for:_11/5/2024    You should report to:  _x___[facility]: [address] at [address] [address]. [address]     Please Note   Tell your doctor if you take Aspirin, products containing Aspirin, herbal medications  or blood thinners, such as Coumadin, Ticlid, or Plavix.  (Consult your provider regarding holding or stopping before surgery).  Arrange for someone to drive you home following surgery.  You will not be allowed to leave the surgical facility alone or drive yourself home following sedation and anesthesia.    Before Surgery  Stop taking all herbal medications, vitamins, and supplements 7 days prior to surgery  No Motrin/Advil (Ibuprofen) 7 days before surgery  No Aleve (Naproxen) 7 days before surgery  Stop Taking Asprin, products containing Asprin _7____days before surgery  Stop taking blood thinners_______days before surgery  No Goody's/BC  Powder 7 days before surgery  Refrain from drinking alcoholic beverages for 24hours before and after surgery  Stop or limit smoking ____7_____days before surgery  You may take Tylenol for pain    Night before Surgery  Do not eat or drink after midnight  Take a shower or bath (shower is recommended).  Bathe with Hibiclens soap or an antibacterial soap from the neck down.  If not supplied by your surgeon, hibiclens soap will need to be purchased over the counter in pharmacy.  Rinse soap off thoroughly.  Shampoo your hair with your regular shampoo    The Day of Surgery  Take another bath or shower with hibiclens or any antibacterial soap, to reduce the chance of infection.  Take heart and blood pressure medications with a small sip of water, as advised by the perioperative team.  Do not take fluid pills  You may brush your teeth and rinse your mouth, but do not swall any additional water.   Do not apply perfumes, powder, body lotions or deodorant on the day of surgery.  Nail polish should be removed.  Do  not wear makeup or moisturizer  Wear comfortable clothes, such as a button front shirt and loose fitting pants.  Leave all jewelry, including body piercings, and valuables at home.    Bring any devices you will neeed after surgery such as crutches or canes.  If you have sleep apnea, please bring your CPAP machine  In the event that your physical condition changes including the onset of a cold or respiratory illness, or if you have to delay or cancel your surgery, please notify your surgeon.       Anesthesia: Regional Anesthesia    Youre scheduled for surgery. During surgery, youll receive medicine called anesthesia to keep you comfortable and pain-free. Your surgeon has decided that youll receive regional anesthesia. This sheet tells you what to expect with this type of anesthesia.  What is regional anesthesia?  Regional anesthesia numbs one region of your body. The anesthesia may be given around nerves or into veins in your arms, neck, or legs (nerve block or [place] block). Or it may be sent into the spinal fluid (spinal anesthesia) or into the space just outside the spinal fluid (epidural anesthesia). You may also be given sedatives to help you relax.  Nerve block or [place] block  A small area of the body, such as an arm or leg, can be numbed using a nerve block or [place] block.  Nerve block. During a nerve block, your skin is numbed. A needle is then inserted near nerves that serve the area to be numbed. Anesthetic is sent through the needle.  IV regional or [place] block. For this type of block, an IV line is put into a vein. The blood flow to the area to be numbed is blocked for a short time. Anesthetic is sent through the IV.  Spinal anesthesia  Spinal anesthesia numbs your body from about the waist down.  Anesthetic is injected into the spinal fluid. This is a substance that surrounds the spinal cord in your spinal column. The anesthetic blocks pain traveling from the body to the brain.  To receive the anesthetic,  your skin is numbed at the injection site on your back.  A needle is then inserted into the spinal space. Anesthetic is sent into the spinal fluid through the needle.  Epidural anesthesia  Epidural anesthesia is most commonly used during childbirth and may also be used after surgical procedures of the chest, belly, and legs.  Anesthetic is injected into the epidural space. This is just outside the dural sac which contains the spinal fluid.  To receive the anesthetic, your skin is numbed at the injection site on your back.  A needle is then inserted into the epidural space. Anesthetic is sent into the epidural space through the needle.  A small flexible catheter may be attached to the needle and left in place. This allows for continuous injections or infusions of anesthetic.  Anesthesia tools and medicines that might be near you during your procedure  Local anesthetic. This medicine is given through a needle numbs one region of your body.  Electrocardiography leads (electrodes). These are used to record your heart rate and rhythm.  Blood pressure cuff. A cuff is placed on your arm to keep track of your blood pressure.  Pulse oximeter. This small clip is placed on the end of the finger. It measures your blood oxygen level.  Sedatives. These medicines may be given through an IV. They help to relax you and keep you comfortable. You may stay awake or sleep lightly.  Oxygen. You may be given oxygen through a facemask.  Risks and possible complications  Regional anesthesia carries some risks. These include:  Nausea and vomiting  Headache  Backache  Decreased blood pressure  Allergic reaction to the anesthetic  Ongoing numbness (rare)  Irregular heartbeat (rare)  Cardiac arrest (rare)   Date Last Reviewed: 12/1/2016 © 2000-2017 [employer]. [address] [place] [address], [place] [address]. All rights reserved. This information is not intended as a substitute for professional medical care. Always follow your  healthcare professional's instructions.

## 2024-10-22 ENCOUNTER — TELEPHONE (OUTPATIENT)
Dept: SPORTS MEDICINE | Facility: CLINIC | Age: 74
End: 2024-10-22
Payer: MEDICARE

## 2024-10-22 ENCOUNTER — TELEPHONE (OUTPATIENT)
Dept: INTERNAL MEDICINE | Facility: CLINIC | Age: 74
End: 2024-10-22
Payer: MEDICARE

## 2024-10-22 ENCOUNTER — CLINICAL SUPPORT (OUTPATIENT)
Dept: REHABILITATION | Facility: HOSPITAL | Age: 74
End: 2024-10-22
Payer: MEDICARE

## 2024-10-22 DIAGNOSIS — M25.661 DECREASED RANGE OF MOTION (ROM) OF RIGHT KNEE: Primary | ICD-10-CM

## 2024-10-22 PROCEDURE — 97112 NEUROMUSCULAR REEDUCATION: CPT

## 2024-10-22 RX ORDER — BUPROPION HYDROCHLORIDE 150 MG/1
150 TABLET ORAL DAILY
COMMUNITY
Start: 2024-10-14

## 2024-10-22 NOTE — PROGRESS NOTES
[facility]   Physical Therapy Treatment Note     Name: Sandra Lui  [facility] Number: [id number]    Therapy Diagnosis:   Encounter Diagnosis   Name Primary?    Decreased range of motion (ROM) of right knee Yes     Physician: Sha Duran MD    Visit Date: 10/22/2024    Physician Orders: PT Eval and Treat   Medical Diagnosis from Referral: M17.11 (ICD-10-CM) - Unilateral primary osteoarthritis, right knee   Evaluation Date: 10/16/2024  Authorization Period Expiration: 12/31/2024  Plan of Care Expiration: 11/1/2024  Progress Note Due: 10th visit  Visit # / Visits authorized: 1 / 25 + eval  FOTO: 1/1    PTA Visit #: 0 / 5     Time In: 9:00 am  Time Out: 9:55 am  Total Treatment Time: 40 minutes  Total Billable Time: 25  minutes (2 NMR)    Precautions: Standard and Diabetes     SUBJECTIVE     Patient reports: she felt good after previous session, however also endorses soreness. She notes having pain, but it is not as intense as previous.  She was compliant with home exercise program.  Response to previous treatment: appropriate muscle response  Functional change: ongoing    Pain: 6-7/10, currently  Location: Right knee    Patient goals: improve functional mobility     OBJECTIVE     Objective Measures updated at progress report unless specified.     Treatment     Pat received the treatments listed below:      therapeutic exercises to develop strength, endurance, ROM, and flexibility for 00 minutes including:  Heel prop; 3 minutes  Heel slides; 10 second hold, 5 minutes - NP    manual therapy techniques: Joint mobilizations were applied to the: Right knee for 5 minutes, including:   Tibiofemoral joint distraction at EOM  AP mobs in seated    neuromuscular re-education activities to improve: Balance, Coordination, Kinesthetic, Sense, and motor control for 30 minutes. The following activities were included:  Long sitting heel prop; 45 second hold, 4 reps for full quad activation and  hamstring inhibition  Quad sets with towel roll; 10 second hold, 5 minutes  Short arc quads with medium bolster; 10 second hold, 5 minutes  Short arc qauds with small bolster; 10 second hold, 5 minutes - use of strap  Long arc quads at EOM; 10 second hold, 5 minutes    therapeutic activities to improve functional performance for 10 minutes, including:  Nustep for endogenous release of opioids; 10 minutes, Level 1    Cold pack to Right knee for 8 minutes post session.    Patient Education and Home Exercises     Home Exercises Provided and Patient Education Provided     Education provided:   - compliance with HEP  - plan of care  - prognosis  - importance of range of motion for proper gait cycle and functional mobility    Written Home Exercises Provided: Patient instructed to cont prior HEP. Exercises were reviewed and Pat was able to demonstrate them prior to the end of the session.  Pat demonstrated good  understanding of the education provided. See EMR under Patient Instructions for exercises provided during therapy sessions    ASSESSMENT     Poor tolerance for therapeutic interventions; held NMES due to poor tolerance for performing low level exercises such as quad sets. Applied a hot pack throughout session. Will continue to emphasize quadriceps motor control and strength at future sessions.       Pat Is progressing well towards her goals.   Pt prognosis is Fair.     Pt will continue to benefit from skilled outpatient physical therapy to address the deficits listed in the problem list box on initial evaluation, provide pt/family education and to maximize pt's level of independence in the home and community environment.     Pt's spiritual, cultural and educational needs considered and pt agreeable to plan of care and goals.     Anticipated barriers to physical therapy: chronicity, age    Goals:   Long Term Goals (3 Weeks): - Appropriate, ongoing  1. Pt will be independent with HEP to supplement physical therapy  treatment in improving functional status.   2. Pt will demonstrate improved impaired lower extremity strength by 1/2 grade to promote functional mobility.   3. Patient will require <2 verbal and tactile cue to engage quadricep without compensation to demonstrate improved quadriceps control and awareness.     PLAN     Plan of Care Certification: 10/16/2024 to 11/1/2024.     Outpatient Physical Therapy 3-4 times weekly for 6 weeks to include the following interventions: Aquatic Therapy, Cervical/Lumbar Traction, Electrical Stimulation TENS, Gait Training, Manual Therapy, Moist Heat/ Ice, Neuromuscular Re-ed, Orthotic Management and Training, Patient Education, Self Care, Therapeutic Activities, Therapeutic Exercise, and functional dry needling    PT/PTA met face to face to discuss patient's treatment plan and progress towards established goals. Patient will be seen by physical therapist every sixth visit and minimally once per month.    Yessi Echevarria, PT, DPT, OCS

## 2024-10-22 NOTE — TELEPHONE ENCOUNTER
----- Message from Sherly sent at 10/22/2024  1:25 PM CDT -----  Regarding: PT'S CALLING REGARDING INJECTION ON10/25  Contact: pt  Luis Eduardo procedure. Pt's requesting a call back from staff    Confirmed contact info below:  Contact Name: Sandra SWENSON Hu  Phone Number: [phone]

## 2024-10-23 ENCOUNTER — LAB VISIT (OUTPATIENT)
Dept: LAB | Facility: HOSPITAL | Age: 74
End: 2024-10-23
Attending: ANESTHESIOLOGY
Payer: MEDICARE

## 2024-10-23 ENCOUNTER — OFFICE VISIT (OUTPATIENT)
Dept: INTERNAL MEDICINE | Facility: CLINIC | Age: 74
End: 2024-10-23
Payer: MEDICARE

## 2024-10-23 VITALS
DIASTOLIC BLOOD PRESSURE: 78 MMHG | HEART RATE: 64 BPM | WEIGHT: 255 LBS | HEIGHT: 67 IN | BODY MASS INDEX: 40.02 KG/M2 | TEMPERATURE: 98 F | SYSTOLIC BLOOD PRESSURE: 171 MMHG | OXYGEN SATURATION: 98 % | RESPIRATION RATE: 16 BRPM

## 2024-10-23 DIAGNOSIS — Z01.818 PREOP EXAMINATION: Primary | ICD-10-CM

## 2024-10-23 DIAGNOSIS — G47.30 SLEEP APNEA, UNSPECIFIED TYPE: ICD-10-CM

## 2024-10-23 DIAGNOSIS — M54.2 CHRONIC NECK PAIN: ICD-10-CM

## 2024-10-23 DIAGNOSIS — M81.0 OSTEOPOROSIS, UNSPECIFIED OSTEOPOROSIS TYPE, UNSPECIFIED PATHOLOGICAL FRACTURE PRESENCE: ICD-10-CM

## 2024-10-23 DIAGNOSIS — G89.29 CHRONIC NECK PAIN: ICD-10-CM

## 2024-10-23 DIAGNOSIS — M54.50 CHRONIC MIDLINE LOW BACK PAIN WITHOUT SCIATICA: ICD-10-CM

## 2024-10-23 DIAGNOSIS — I10 PRIMARY HYPERTENSION: ICD-10-CM

## 2024-10-23 DIAGNOSIS — M17.11 PRIMARY OSTEOARTHRITIS OF RIGHT KNEE: ICD-10-CM

## 2024-10-23 DIAGNOSIS — N18.30 STAGE 3 CHRONIC KIDNEY DISEASE, UNSPECIFIED WHETHER STAGE 3A OR 3B CKD: ICD-10-CM

## 2024-10-23 DIAGNOSIS — K21.9 GASTROESOPHAGEAL REFLUX DISEASE, UNSPECIFIED WHETHER ESOPHAGITIS PRESENT: ICD-10-CM

## 2024-10-23 DIAGNOSIS — Z86.16 HISTORY OF COVID-19: ICD-10-CM

## 2024-10-23 DIAGNOSIS — Z90.710 H/O TOTAL HYSTERECTOMY: ICD-10-CM

## 2024-10-23 DIAGNOSIS — M79.609 PAIN IN EXTREMITY, UNSPECIFIED EXTREMITY: ICD-10-CM

## 2024-10-23 DIAGNOSIS — G45.9 TIA (TRANSIENT ISCHEMIC ATTACK): ICD-10-CM

## 2024-10-23 DIAGNOSIS — T78.40XD ALLERGY, SUBSEQUENT ENCOUNTER: ICD-10-CM

## 2024-10-23 DIAGNOSIS — R60.9 EDEMA, UNSPECIFIED TYPE: ICD-10-CM

## 2024-10-23 DIAGNOSIS — K59.00 CONSTIPATION, UNSPECIFIED CONSTIPATION TYPE: ICD-10-CM

## 2024-10-23 DIAGNOSIS — E78.5 HYPERLIPIDEMIA, UNSPECIFIED HYPERLIPIDEMIA TYPE: ICD-10-CM

## 2024-10-23 DIAGNOSIS — R73.03 PREDIABETES: ICD-10-CM

## 2024-10-23 DIAGNOSIS — F32.A DEPRESSION, UNSPECIFIED DEPRESSION TYPE: ICD-10-CM

## 2024-10-23 DIAGNOSIS — G89.29 CHRONIC MIDLINE LOW BACK PAIN WITHOUT SCIATICA: ICD-10-CM

## 2024-10-23 DIAGNOSIS — J45.909 ASTHMA, UNSPECIFIED ASTHMA SEVERITY, UNSPECIFIED WHETHER COMPLICATED, UNSPECIFIED WHETHER PERSISTENT: ICD-10-CM

## 2024-10-23 DIAGNOSIS — M35.3 POLYMYALGIA: ICD-10-CM

## 2024-10-23 DIAGNOSIS — M10.9 GOUT, UNSPECIFIED CAUSE, UNSPECIFIED CHRONICITY, UNSPECIFIED SITE: ICD-10-CM

## 2024-10-23 PROBLEM — T78.40XA ALLERGIES: Status: ACTIVE | Noted: 2024-10-23

## 2024-10-23 LAB
INR PPP: 1 (ref 0.8–1.2)
PROTHROMBIN TIME: 10.5 SEC (ref 9–12.5)

## 2024-10-23 PROCEDURE — 1159F MED LIST DOCD IN RCRD: CPT | Mod: CPTII,S$GLB,, | Performed by: HOSPITALIST

## 2024-10-23 PROCEDURE — 1125F AMNT PAIN NOTED PAIN PRSNT: CPT | Mod: CPTII,S$GLB,, | Performed by: HOSPITALIST

## 2024-10-23 PROCEDURE — 36415 COLL VENOUS BLD VENIPUNCTURE: CPT | Performed by: ANESTHESIOLOGY

## 2024-10-23 PROCEDURE — 85610 PROTHROMBIN TIME: CPT | Performed by: ANESTHESIOLOGY

## 2024-10-23 PROCEDURE — 3288F FALL RISK ASSESSMENT DOCD: CPT | Mod: CPTII,S$GLB,, | Performed by: HOSPITALIST

## 2024-10-23 PROCEDURE — 1101F PT FALLS ASSESS-DOCD LE1/YR: CPT | Mod: CPTII,S$GLB,, | Performed by: HOSPITALIST

## 2024-10-23 PROCEDURE — 3077F SYST BP >= 140 MM HG: CPT | Mod: CPTII,S$GLB,, | Performed by: HOSPITALIST

## 2024-10-23 PROCEDURE — 99999 PR PBB SHADOW E&M-EST. PATIENT-LVL V: CPT | Mod: PBBFAC,,, | Performed by: HOSPITALIST

## 2024-10-23 PROCEDURE — 3078F DIAST BP <80 MM HG: CPT | Mod: CPTII,S$GLB,, | Performed by: HOSPITALIST

## 2024-10-23 PROCEDURE — 99215 OFFICE O/P EST HI 40 MIN: CPT | Mod: S$GLB,,, | Performed by: HOSPITALIST

## 2024-10-23 NOTE — ASSESSMENT & PLAN NOTE
She has asthma flare up with the upper respiratory illnesses that is not uncommon  She had has had sinus problem that she is getting over accept mild hoarseness  She is currently not on any maintenance medication and is doing well with asthma control

## 2024-10-23 NOTE — ASSESSMENT & PLAN NOTE
I have discussed the stages of chronic kidney disease and suggested measures to reduce the progression of chronic kidney disease    Suggested good blood pressure control, weight control, hydration  Suggested avoidance of anti-inflammatory medication IV contrast    Creatinine stable  Stages of CKD discussed  Deleterious effects NSAID's , Beneficial effects of Hydration discussed   Acetaminophen ( If not intolerant ) as needed for pain     I  suggest monitoring renal function, in put and out put status abby-operatively. I  suggest avoiding nephrotoxic medication including NSAIDs, COX2 inhibitors, intravenous contrast agent,avoiding hypotension to prevent further renal impairment.   Care with intravenous contrast discussed

## 2024-10-23 NOTE — ASSESSMENT & PLAN NOTE
She is on calcium and vitamin-D  Suggested follow up    She was also taking steroid treatment for 4 polymyalgia rheumatica

## 2024-10-23 NOTE — ASSESSMENT & PLAN NOTE
Gout   - Controlled   - I suggest continuation of the maintenance treatment for gout   - I suggest to keep the patient adequately hydrated.  - Please note that surgical stress ,dehydration can precipitate acute gout     Suggested not to take colchicine on the morning of surgery

## 2024-10-23 NOTE — ASSESSMENT & PLAN NOTE
She lost a significant amount of weight of about 40 lb intentionally in preparation for the knee replacement surgery since April of 2024    Weight related conditions     Known to have     HTN  Prediabetes   Gout   Hyperlipidemia   Sleep apnea   Acid reflux   Osteoarthritis    Not troubled with / Not known to have     Type 2 Diabetes   Fatty liver       Encouraged maintaining healthy weight for improved health

## 2024-10-23 NOTE — ASSESSMENT & PLAN NOTE
Sleep apnea   Uses CPAP .  Suggested bringing for hospital use .   Informed the risk of worsening sleep apnea in the perioperative period and suggest using CPAP use any time in 24 hrs ( day or night )for planned sleep     I suggest  caution with usage of medication that can cause respiratory suppression in the perioperative period    Avoidance of  supine sleep, weight gain , alcoholic beverages , care with , sedative , CNS depressant use indicated  since all of these can worsen [place]         I suggest   caution with usage of medication that can cause respiratory suppression in the perioperative period

## 2024-10-23 NOTE — ASSESSMENT & PLAN NOTE
She had injection treatment for the back and seems to be doing reasonably well from a back standpoint and I have encouraged her that with weight loss her back pain might get better and also the way she might be distributing her weight might be different due to right knee pain    She was no leg weakness or urinary incontinence
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
## 2024-10-23 NOTE — ASSESSMENT & PLAN NOTE
She is currently on losartan, metoprolol    Home BP readings -128/72    Hypertension-  Blood pressure is acceptable . I suggest continuation of --metoprolol------ during the entire perioperative period. I suggest holding ---losartan----- on the morning of the surgery and can continue that  post operatively under blood pressure, electrolyte and renal function monitoring as long as they are acceptable.I suggest addressing pain control as uncontrolled pain can increased blood pressure
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
## 2024-10-23 NOTE — ASSESSMENT & PLAN NOTE
She had a total hysterectomy at 34 years of age-for heavy vaginal bleeding from fibroids  She has osteoporosis-she was broken ankle on the left leg from-mechanical fall in 2014

## 2024-10-23 NOTE — ASSESSMENT & PLAN NOTE
Across the neck on both shoulder  She attributes this to polymyalgia-the way she sleeps  She was not have any suggestions of cervical myelopathy

## 2024-10-23 NOTE — ASSESSMENT & PLAN NOTE
Doing good from a mental health stand point     Has supportive family   On Medication that is helping   No Suicidal / Homicidal ideation      I suggest monitoring the sodium as SIADH from Wellbutrin use and hypersecretion of ADH associated with surgery can reduce sodium in the perioperative period

## 2024-10-23 NOTE — ASSESSMENT & PLAN NOTE
Polymyalgia rheumatica    Was under rheumatology care  She is currently not under rheumatology care due to insurance reasons    She had polymyalgia for about 5 years    She feels pain in the both shoulder areas    I have placed a rheumatology evaluation but she realizes that it may not be possible that she sees a rheumatologist/getting treated for polymyalgia rheumatica between now and surgery and she chose to continue with the surgery realizing that she may have to weightbear on the shoulders for some time during the recovery process   ROSA ambulatory encounter  FAMILY PRACTICE OFFICE VISIT    CHIEF COMPLAINT:    Chief Complaint   Patient presents with   • Dizziness       SUBJECTIVE:  Geni Lr is a 51 year old female who presented requesting evaluation for follow-up on chronic medical conditions including asthma, anxiety, depression, arthritis and HIV infection. She is known to take care of infectious disease for management of HIV.  She denies depression but she is having  A lot of stress in her life. She is dealing with her medical conditions but she is also dealing with her older daughter's medical conditions. Her daughter is 31 years old and she had breast cancer 3 years ago and it was discovered when she was opregnant with her first kid. She got treatment and got remission but few months later was diagnosed with Leukemia got treatment and got better, she got pregnant again and has a 4 month old daughter and now she was diagnosed with breast cancer again and she gutierrez snot want to have treatment as she says when she has been on chemotherapy it is so hard that she does not want to gt through it all over again. She prefers to pass away in peace. Patient's son is very depressed with this whole situation and he has attempted suicide 3 times. Patient's  was deported to [place] 4 years ago. Despite all this patient states she gets sad but does not feel depressed . She is always trying to do something new. She enjoys listening to music and exercising. She is also her e because her HIV specialist is leaving the practice ans she was asked to follow up with PCP. She states she had blood work in August but she has not been informed of the results. She would like to go over this results. She is also requesting albuterol inhaler for treat ment of asthma. Patient also reports on and off dizziness. No urinary symptoms. Denies nausea, vomiting or diarrhea.       Review of systems:   Constitutional: Negative for fever and chills.    Dwhsfmu-Sjfmw-Gjxodveb-TenofAF (GENVOYA) [phone]-Mely Valadez MD

## 2024-10-23 NOTE — ASSESSMENT & PLAN NOTE
Suggested working on bowel movements in preparation for surgery   Constipation- I suggest giving bowel movement regimen as opioid use,reduced ambulation  can increase the constipation

## 2024-10-23 NOTE — ASSESSMENT & PLAN NOTE
She is doing good from an acid reflux standpoint taking Pepcid      Does not sound Cardiac     GERD-  I suggest continuation of the Pepcid in the perioperative period . I suggest aspiration precautions

## 2024-10-23 NOTE — ASSESSMENT & PLAN NOTE
Had 2 TIAs 2022 and 2023  1st-had difficulty operating her TV-difficulty with speech expression-she was taken to the emergency room  She was not hospitalized  Symptoms resolved in less than 15 minutes  She was commenced on Plavix in addition to the aspirin she was taking  To her understanding her TIA was attributed to carotid artery stenosis on the left side  No unilateral symptoms    The 2nd episode-had recurrence of the similar symptoms as for the 1st 1 but not as bad-she was already taking aspirin Plavix at that time  From what I gather, her TIAs were attributed to carotid stenosis rather than a cardioembolic source         Sept 2024     RIGHT SIDE:   Minimal 1-39% Right Bulb/ICA stenosis.   Heterogeneous plaque noted in the right internal carotid artery.   Antegrade flow noted in the right vertebral artery.     LEFT SIDE:   Normal - No evidence of plaque in the left internal carotid artery.   Antegrade flow in the left vertebral artery.     2022    1. Brain CTA:   *   No major branch occlusion, flow limiting stenosis, or aneurysm is identified intracranially.         2. Neck CTA:   *   No evidence of hemodynamically significant cervical carotid or vertebral stenosis     ----   2 episodes of TIAs is manifested by a proximally 10 minutes of dysarthria 1 2022 and once in 2023.   She was begun on dual antiplatelet therapy and statin has had none since.  She was evidently told by a vascular specialist she had some carotid stenosis but not enough to intervene upon     As per vascular - sept 2024-No evidence of carotid stenosis by recent duplex exam here which is consistent with her imaging from 2022 in [place]    Based on what I gather her cerebrovascular events were likely from small-vessel disease rather than carotid/cardiac problems    Risk factors for TIA     Hypertension   Hyperlipidemia    Secondary prevention     Aspirin   Statin   Plavix     She usually holds Plavix and aspirin 1 week prior to procedures with  no problems    I suggested to hold Plavix 1 week before surgery in the last day of Plavix use prior to surgery is 28th of October    After discussing the risks benefits of aspirin use with her, she chose to continue on aspirin which I support

## 2024-10-23 NOTE — ASSESSMENT & PLAN NOTE
She had cardiac evaluation due to the TIA  To her understanding, she was not have heart failure, arrhythmia, valve problem or coronary artery disease

## 2024-10-23 NOTE — ASSESSMENT & PLAN NOTE
Last A1c was 5.7   I have encouraged her that with increased mobility after joint replacement surgery, weight loss her prediabetes might resolve    Hemoglobin A1c in the pre diabetic range   Weight loss with diet and exercise , if able, helps lower A1c  Increased risk of becoming a diabetic .  Suggested follow-up     I suggest monitoring the glucose in the perioperative period as  glucose may be high from stress hyperglycemia in which case Insulin may be required

## 2024-10-23 NOTE — OUTPATIENT SUBJECTIVE & OBJECTIVE
Outpatient Subjective & Objective     Chief complaint-Preoperative evaluation, Perioperative Medical management, complication reduction plan     Active cardiac conditions- none    Revised cardiac risk index predictors- history of cerebrovascular disease    Functional capacity -Examples of physical activity  - walks , than fairly good she is doing physical therapy can take a flight of stairs holding on to the railing----- She can undertake all the above activities without  chest pain,chest tightness, Shortness of breath ,dizziness,lightheadedness making her exercise tolerance more,   than 4 Mets.       Review of Systems   Constitutional:  Negative for chills and fever.   HENT:          Sleep apnea     Eyes:         No new visual changes   Respiratory:          No cough , phlegm    No Hemoptysis   Cardiovascular:         As noted   Gastrointestinal:         No overt GI/ blood losses  Bowel movements- longstanding bowel frequency once in couple days constipated   Endocrine:        Prednisone use > 20 mg daily for 3 weeks- None\     Genitourinary:  Negative for dysuria.        No urinary hesitancy   She had urinary tract infection about 3 months ago  She took the antibiotic for about 5 days  Her history suggests that she had a urine test routinely that showed UTI    She currently does not have any indication for testing her urine for urinary tract infection   Musculoskeletal:         As above   Right knee more painful than the left knee   Skin:  Negative for rash.   Neurological:  Negative for syncope.        No unilateral weakness   Hematological:         None       No past medical history pertinent negatives.  No family history on file.      No anesthesia, bleeding, cardiac problems , PONVwith previous surgeries/procedures.  Medications and Allergies reviewed in epic.     FH- No anesthesia,bleeding / venous thrombosis , in family    Physical Exam  Blood pressure (!) 171/78, pulse 64, temperature 97.7 °F (36.5 °C),  "temperature source Oral, resp. rate 16, height 5' 7" (1.702 m), weight 115.7 kg (255 lb), SpO2 98%.    I offered a sheet and the presence of a chaperone during physical examination   She was comfortable to proceed with the exam without the the presence of a chaperone    Physical Exam  Constitutional- Vitals - Body mass index is 39.94 kg/m².,   Vitals:    10/23/24 0743   BP: (!) 171/78   Pulse: 64   Resp: 16   Temp: 97.7 °F (36.5 °C)     General appearance-Conscious,Coherent  Eyes- No conjunctival icterus,pupils  round , reactive to light , and  bilateral intra ocular lenses  ENT-Oral cavity- moist ,  upper denture, and lower partial denture    , Hearing grossly normal   Neck- No thyromegaly ,Trachea -central, No jugular venous distension,   No Carotid Bruit   Cardiovascular -Heart Sounds- Normal  and  no murmur   , No gallop rhythm   Respiratory - Normal Respiratory Effort, Normal breath sounds,  no wheeze , and  no forced expiratory wheeze    Peripheral pitting pedal edema-- mild, no calf pain   Gastrointestinal -Soft abdomen, No palpable masses, Non Tender,Liver,Spleen not palpable. No-- free fluid and shifting dullness  Musculoskeletal- No finger Clubbing. Strength grossly normal   Lymphatic-No Palpable cervical, axillary,Inguinal lymphadenopathy   Psychiatric - normal effect,Orientation  Rt Dorsalis pedis pulses-palpable    Lt Dorsalis pedis pulses- palpable   Rt Posterior tibial pulses -palpable   Left posterior tibial pulses -palpable   Miscellaneous -  Surgical scarAbdomen  and  no renal bruit   Investigations  Lab and Imaging have been reviewed in epic.    Review of Medicine tests    EKG- I had independently reviewed the EKG from--July 2024  It was reported to be showing   Sinus bradycardia with 1st degree A-V block   Otherwise normal ECG         Review of clinical lab tests:  Lab Results   Component Value Date    CREATININE 1.1 08/19/2024    HGB 14.1 08/19/2024     08/19/2024         Review of old " records- Was done and information gathered regards to events leading to surgery and health conditions of significance in the perioperative period         Review of old records- Was done and information gathered regards to events leading to surgery and health conditions of significance in the perioperative period.    Outpatient Subjective & Objective

## 2024-10-23 NOTE — ASSESSMENT & PLAN NOTE
Both legs and both feet that she attributes to salt intake     Contributing factors     Venous insufficiency  Salted food         Not known to have     Heart failure   Liver failure   Kidney failure     Edema- I suggested avoidance of added salt,avoidance of NSAID's, unless advised or ordered  and suggested Limb elevation and stocking use

## 2024-10-24 ENCOUNTER — CLINICAL SUPPORT (OUTPATIENT)
Dept: REHABILITATION | Facility: HOSPITAL | Age: 74
End: 2024-10-24
Payer: MEDICARE

## 2024-10-24 DIAGNOSIS — M25.661 DECREASED RANGE OF MOTION (ROM) OF RIGHT KNEE: Primary | ICD-10-CM

## 2024-10-24 PROCEDURE — 97112 NEUROMUSCULAR REEDUCATION: CPT | Mod: CQ

## 2024-10-24 PROCEDURE — 97530 THERAPEUTIC ACTIVITIES: CPT | Mod: CQ

## 2024-10-24 NOTE — PROGRESS NOTES
"[facility]   Physical Therapy Treatment Note     Name: Sandra Lui  Clinic Number: [id number]    Therapy Diagnosis:   Encounter Diagnosis   Name Primary?    Decreased range of motion (ROM) of right knee Yes     Physician: Sha Duran MD    Visit Date: 10/24/2024    Physician Orders: PT Eval and Treat   Medical Diagnosis from Referral: M17.11 (ICD-10-CM) - Unilateral primary osteoarthritis, right knee   Evaluation Date: 10/16/2024  Authorization Period Expiration: 12/31/2024  Plan of Care Expiration: 11/1/2024  Progress Note Due: 10th visit  Visit # / Visits authorized: 3 / 25 + eval  FOTO: 1/1    PTA Visit #: 1 / 5     Time In: 0852  Time Out: 1005  Total Treatment Time: 73 minutes  Total Billable Time: 38 minutes (1 TA, 2 NMR)    Precautions: Standard and Diabetes     SUBJECTIVE     Patient reports: "today is a good day." She goes tomorrow for the nerve block in her knee.  She was compliant with home exercise program.  Response to previous treatment: appropriate muscle response  Functional change: ongoing    Pain: 2/10, currently  Location: Right knee    Patient goals: improve functional mobility     OBJECTIVE     Objective Measures updated at progress report unless specified.     Treatment     Pat received the treatments listed below:      therapeutic exercises to develop strength, endurance, ROM, and flexibility for 00 minutes including:  Heel prop; 3 minutes  Heel slides; 10 second hold, 5 minutes - NP    manual therapy techniques: Joint mobilizations were applied to the: Right knee for 00 minutes, including:   Tibiofemoral joint distraction at EOM  AP mobs in seated    neuromuscular re-education activities to improve: Balance, Coordination, Kinesthetic, Sense, and motor control for 50 minutes. The following activities were included:    Long sitting HS stretch in heel prop; 45 second hold, 4 reps for full quad activation and hamstring inhibition  Lateral walks at counter (9 " feet); 5 minutes    *use of moist heat pack to Right knee during exercises:  Quad sets with towel roll; 10 second hold, 5 minutes  Short arc quads with medium bolster; 5-10 second hold, 5 minutes  Short arc quads with small bolster; 10 second hold, 5 minutes - use of strap, significant difficulty  Long arc quads at EOM; 10 second hold, 5 minutes    therapeutic activities to improve functional performance for 15 minutes, including:  Nustep for endogenous release of opioids; 10 minutes/1,000K steps, Level 1  Sit to stands from hi-lo mat; 2 x 5 reps with UE assist    cold pack to Right knee for 8 minutes post session.    Patient Education and Home Exercises     Home Exercises Provided and Patient Education Provided     Education provided:   - compliance with HEP  - plan of care  - prognosis  - importance of range of motion for proper gait cycle and functional mobility    Written Home Exercises Provided: Patient instructed to cont prior HEP. Exercises were reviewed and Pat was able to demonstrate them prior to the end of the session.  Pat demonstrated good  understanding of the education provided. See EMR under Patient Instructions for exercises provided during therapy sessions    ASSESSMENT     Poor quad activation observed at this time with mod/max cueing for muscle activation and isolation to prevent glut compensation. Unable to initiate NMES this session secondary to pants donned. Moist heat pack applied with open chain exercises for pain reduction. Addition of sit to stands from high surface and lateral walks to improve weight bearing tolerance for functional ADL's. Will continue per POC towards treatment goals.  Pat Is progressing well towards her goals.   Pt prognosis is Fair.     Pt will continue to benefit from skilled outpatient physical therapy to address the deficits listed in the problem list box on initial evaluation, provide pt/family education and to maximize pt's level of independence in the home and  community environment.     Pt's spiritual, cultural and educational needs considered and pt agreeable to plan of care and goals.     Anticipated barriers to physical therapy: chronicity, age    Goals:   Long Term Goals (3 Weeks): - Appropriate, ongoing  1. Pt will be independent with HEP to supplement physical therapy treatment in improving functional status.   2. Pt will demonstrate improved impaired lower extremity strength by 1/2 grade to promote functional mobility.   3. Patient will require <2 verbal and tactile cue to engage quadricep without compensation to demonstrate improved quadriceps control and awareness.     PLAN     Plan of Care Certification: 10/16/2024 to 11/1/2024.     Outpatient Physical Therapy 3-4 times weekly for 6 weeks to include the following interventions: Aquatic Therapy, Cervical/Lumbar Traction, Electrical Stimulation TENS, Gait Training, Manual Therapy, Moist Heat/ Ice, Neuromuscular Re-ed, Orthotic Management and Training, Patient Education, Self Care, Therapeutic Activities, Therapeutic Exercise, and functional dry needling    PT/PTA met face to face to discuss patient's treatment plan and progress towards established goals. Patient will be seen by physical therapist every sixth visit and minimally once per month.    Cristy Cordova PTA

## 2024-10-25 ENCOUNTER — PROCEDURE VISIT (OUTPATIENT)
Dept: SPORTS MEDICINE | Facility: CLINIC | Age: 74
End: 2024-10-25
Payer: MEDICARE

## 2024-10-25 VITALS
BODY MASS INDEX: 41.28 KG/M2 | DIASTOLIC BLOOD PRESSURE: 87 MMHG | SYSTOLIC BLOOD PRESSURE: 155 MMHG | WEIGHT: 263 LBS | HEIGHT: 67 IN | HEART RATE: 60 BPM

## 2024-10-25 DIAGNOSIS — M25.561 CHRONIC PAIN OF RIGHT KNEE: Primary | ICD-10-CM

## 2024-10-25 DIAGNOSIS — G89.29 CHRONIC PAIN OF RIGHT KNEE: Primary | ICD-10-CM

## 2024-10-25 RX ORDER — GABAPENTIN 300 MG/1
300 CAPSULE ORAL 2 TIMES DAILY
Qty: 30 CAPSULE | Refills: 0 | Status: SHIPPED | OUTPATIENT
Start: 2024-10-25

## 2024-10-25 NOTE — PROCEDURES
CC: right knee pain    74 y.o. Female presents today for Iovera procedure for right knee pain.    INFORMED CONSENT: I verify that I personally obtained Sandra Lui's consent which was signed and uploaded into [email or web address].     PAIN SCORE:  Pre-procedure:  10/10  Gait: antalgic    Inspection/Palpation:   +Skin warm and dry without open wounds or erythema  -Rubor   -Calor    Procedure Note Iovera:    DATE OF PROCEDURE:  10/25/2024    PREOPERATIVE DIAGNOSIS: right knee pain.     POSTOPERATIVE DIAGNOSIS: right knee pain.     PROCEDURE: Iovera treatment of anterior femoral cutaneous nerve, Lateral femoral cut nerve, and superior and inferior branches of infrapatellar saphenous nerve using at least 4+ different punctures to treat all 4 nerves. (cpt 64640 x4)    COMPLICATIONS: None.     IMPLANTS:  None    ESTIMATED BLOOD LOSS:  < 5cc    SPECIMENS REMOVED:  None    ANESTHESIA: 20cc Local xylocaine with epinephrine    INDICATIONS FOR PROCEDURE: This is a 74 y.o. female with longstanding knee pain. They have failed non operative management including injections.I discussed a new treatment therapy called Iovera, which is cryotherapy, to provide symptomatic relief along the sensory distribution of the infrapatellar tendon branch of the saphenous nerve  and AFCN. The patient elected to move forward with this  We did discuss the fact that this is a fairly novel procedure and there is very limited scientific data around this.  However, it FDA approved.  The patient was given patient information and literature to review prior to the procedure as well.  Based on this, the patient agreed to move forward with doing the procedure.    Time was spent discussing the cryoanalgesia procedure Iovera with the patient.  Risks and benefits were also discussed with patient.  X-rays were reviewed to use as a diagram to explain procedure. A detailed description of landmarks and placement of anesthetic used during procedure were also explained to  patient.  Contraindications for procedure were discussed with patient.    CONSENT:  Verbal and written consent were obtained from the patient.     PROCEDURE:    A surgical time out was performed, including verification of patient ID, procedure to be performed, site and side, availability of information and equipment, review of safety issues, and the patients agreement and consent.  The patient was placed supine on the exam table and the proximal medial aspect of the right tibia and anterior aspect of distal femur was prepped with sterile Chlorhexidine and alcohol.  A line was drawn extending approximately 5 cm medial to inferior pole of the patella distally to a point approximately 5 cm medial to the tibial tubercle.  A second line was drawn in a medial to lateral direction the width of the patella approximately 7 cm proximal to the patella. We then infiltrated the skin with lidocaine with epinephrine along both lines using a 25g needle. We then introduced the Iovera device along these lines and this device penetrated the skin, creating cryotherapy to the superior and inferior branches of the infrapatellar saphenous nerve, a third treatment to the anterior femoral cutaneous nerve, and fourth LFCN. 7 punctures of the skin were made to treat the superior and inferior branches of the ISN and another 7 punctures were made to treat the AFCN and LFCN. There were a total of 4 nerves treated with iovera. The patient tolerated the procedure well with no problems.    PAIN SCORES:  Pre-procedure:  10/10  Post-procedure:  0/10  Gait: improved      ASSESSMENT:      ICD-10-CM ICD-9-CM   1. Chronic pain of right knee  M25.561 719.46    G89.29 338.29         PLAN:    All questions were answered to the best of my ability and all concerns were addressed at this time.    This note is dictated using the M*Modal Fluency Direct word recognition program. There are word recognition mistakes that are occasionally missed on review.

## 2024-10-29 ENCOUNTER — CLINICAL SUPPORT (OUTPATIENT)
Dept: REHABILITATION | Facility: HOSPITAL | Age: 74
End: 2024-10-29
Payer: MEDICARE

## 2024-10-29 DIAGNOSIS — M25.661 DECREASED RANGE OF MOTION (ROM) OF RIGHT KNEE: Primary | ICD-10-CM

## 2024-10-29 PROCEDURE — 97014 ELECTRIC STIMULATION THERAPY: CPT | Mod: KX,CQ

## 2024-10-29 PROCEDURE — 97112 NEUROMUSCULAR REEDUCATION: CPT | Mod: KX,CQ

## 2024-10-29 PROCEDURE — 97530 THERAPEUTIC ACTIVITIES: CPT | Mod: KX,CQ

## 2024-10-31 ENCOUNTER — CLINICAL SUPPORT (OUTPATIENT)
Dept: REHABILITATION | Facility: HOSPITAL | Age: 74
End: 2024-10-31
Payer: MEDICARE

## 2024-10-31 DIAGNOSIS — M25.661 DECREASED RANGE OF MOTION (ROM) OF RIGHT KNEE: Primary | ICD-10-CM

## 2024-10-31 PROCEDURE — 97014 ELECTRIC STIMULATION THERAPY: CPT | Mod: KX,CQ

## 2024-10-31 PROCEDURE — 97112 NEUROMUSCULAR REEDUCATION: CPT | Mod: KX,CQ

## 2024-11-01 DIAGNOSIS — Z96.651 S/P TOTAL KNEE REPLACEMENT, RIGHT: Primary | ICD-10-CM

## 2024-11-01 RX ORDER — DEXTROMETHORPHAN HYDROBROMIDE, GUAIFENESIN 5; 100 MG/5ML; MG/5ML
650 LIQUID ORAL EVERY 8 HOURS
Qty: 120 TABLET | Refills: 0 | Status: SHIPPED | OUTPATIENT
Start: 2024-11-01

## 2024-11-01 RX ORDER — METHOCARBAMOL 750 MG/1
750 TABLET, FILM COATED ORAL 4 TIMES DAILY PRN
Qty: 40 TABLET | Refills: 0 | Status: SHIPPED | OUTPATIENT
Start: 2024-11-01 | End: 2024-11-11

## 2024-11-01 RX ORDER — OXYCODONE HYDROCHLORIDE 5 MG/1
TABLET ORAL
Qty: 50 TABLET | Refills: 0 | Status: SHIPPED | OUTPATIENT
Start: 2024-11-01

## 2024-11-01 RX ORDER — ASPIRIN 81 MG/1
81 TABLET ORAL 2 TIMES DAILY
Qty: 60 TABLET | Refills: 0 | Status: SHIPPED | OUTPATIENT
Start: 2024-11-01 | End: 2024-12-01

## 2024-11-01 RX ORDER — PANTOPRAZOLE SODIUM 40 MG/1
40 TABLET, DELAYED RELEASE ORAL DAILY
Qty: 30 TABLET | Refills: 0 | Status: SHIPPED | OUTPATIENT
Start: 2024-11-01 | End: 2024-12-01

## 2024-11-01 RX ORDER — AMOXICILLIN 250 MG
1 CAPSULE ORAL DAILY
Qty: 30 TABLET | Refills: 0 | Status: SHIPPED | OUTPATIENT
Start: 2024-11-01

## 2024-11-04 ENCOUNTER — TELEPHONE (OUTPATIENT)
Dept: ORTHOPEDICS | Facility: CLINIC | Age: 74
End: 2024-11-04
Payer: MEDICARE

## 2024-11-04 NOTE — TELEPHONE ENCOUNTER
Just called pt with her surgery report time and let pt know that I will send a message to out DME folks to get her orders faxed over. Pt verbalized understanding and has no further questions.     ----- Message from Prince Carrera sent at 11/4/2024 12:01 PM CST -----  Regarding: Orders  Contact: pt [phone]  Type:  Needs Order Advice    Who Called:  Sandra is calling stated that she call  [facility] and was told the orders needed to be faxed over before they can do anything. Order is for walker with wheels,  bedside commode  and  shower chair from [facility].   Would the patient rather a call back or a response via [facility]?  Call back   Best Call Back Number: pt [phone]   Additional Information:

## 2024-11-04 NOTE — TELEPHONE ENCOUNTER
I called the patient today regarding surgery on 11/05/2024 with Dr. Sha Duran. I informed the patient that her surgery will be at  [facility] ([facility] [place] [address]). I informed the patient they must arrive at 9:00am and their surgery will start at 11:00am.     Per the [facility] COVID-19 Pre-Procedural Testing Policy (administered 10/26/2022), patients do not need tested for COVID-19 regardless of vaccination status.    I reminded the patient that they cannot eat or drink after midnight, the night before surgery.      I reminded the patient to be careful of their skin over the next few days to make sure they do not get any cuts, scratches or scrapes.    The patient has not yet received their walker, bedside commode or shower chair from [facility]. I told the patient that she needs to call [facility] today at [phone].    The patient verbalized understanding and has no further questions.      ----- Message from Sherly sent at 11/4/2024 11:21 AM CST -----  Regarding: PT IS CALLING REGARDING ARRIVAL TIME FOR SURGERY  Contact: PT  Confirmed contact info below:  Contact Name: Sandra SWENSON Hu  Phone Number: [phone]

## 2024-11-05 ENCOUNTER — ANESTHESIA (OUTPATIENT)
Dept: SURGERY | Facility: HOSPITAL | Age: 74
End: 2024-11-05
Payer: MEDICARE

## 2024-11-05 ENCOUNTER — HOSPITAL ENCOUNTER (OUTPATIENT)
Facility: HOSPITAL | Age: 74
Discharge: HOME | End: 2024-11-06
Attending: ORTHOPAEDIC SURGERY | Admitting: ORTHOPAEDIC SURGERY
Payer: MEDICARE

## 2024-11-05 DIAGNOSIS — M79.609 PAIN IN EXTREMITY, UNSPECIFIED EXTREMITY: Primary | ICD-10-CM

## 2024-11-05 DIAGNOSIS — M17.11 PRIMARY OSTEOARTHRITIS OF RIGHT KNEE: ICD-10-CM

## 2024-11-05 PROCEDURE — 27201423 OPTIME MED/SURG SUP & DEVICES STERILE SUPPLY: Performed by: ORTHOPAEDIC SURGERY

## 2024-11-05 PROCEDURE — 25000003 PHARM REV CODE 250: Performed by: NURSE ANESTHETIST, CERTIFIED REGISTERED

## 2024-11-05 PROCEDURE — 94760 N-INVAS EAR/PLS OXIMETRY 1: CPT

## 2024-11-05 PROCEDURE — 94761 N-INVAS EAR/PLS OXIMETRY MLT: CPT

## 2024-11-05 PROCEDURE — 71000033 HC RECOVERY, INTIAL HOUR: Performed by: ORTHOPAEDIC SURGERY

## 2024-11-05 PROCEDURE — 63600175 PHARM REV CODE 636 W HCPCS

## 2024-11-05 PROCEDURE — 97530 THERAPEUTIC ACTIVITIES: CPT

## 2024-11-05 PROCEDURE — 97116 GAIT TRAINING THERAPY: CPT

## 2024-11-05 PROCEDURE — 63600175 PHARM REV CODE 636 W HCPCS: Performed by: PHYSICIAN ASSISTANT

## 2024-11-05 PROCEDURE — 63600175 PHARM REV CODE 636 W HCPCS: Performed by: STUDENT IN AN ORGANIZED HEALTH CARE EDUCATION/TRAINING PROGRAM

## 2024-11-05 PROCEDURE — D9220A PRA ANESTHESIA: Mod: CRNA,,, | Performed by: NURSE ANESTHETIST, CERTIFIED REGISTERED

## 2024-11-05 PROCEDURE — 97162 PT EVAL MOD COMPLEX 30 MIN: CPT

## 2024-11-05 PROCEDURE — 37000008 HC ANESTHESIA 1ST 15 MINUTES: Performed by: ORTHOPAEDIC SURGERY

## 2024-11-05 PROCEDURE — 27447 TOTAL KNEE ARTHROPLASTY: CPT | Mod: RT,,, | Performed by: ORTHOPAEDIC SURGERY

## 2024-11-05 PROCEDURE — C1713 ANCHOR/SCREW BN/BN,TIS/BN: HCPCS | Performed by: ORTHOPAEDIC SURGERY

## 2024-11-05 PROCEDURE — 25000003 PHARM REV CODE 250: Performed by: STUDENT IN AN ORGANIZED HEALTH CARE EDUCATION/TRAINING PROGRAM

## 2024-11-05 PROCEDURE — 97166 OT EVAL MOD COMPLEX 45 MIN: CPT

## 2024-11-05 PROCEDURE — 36000710: Performed by: ORTHOPAEDIC SURGERY

## 2024-11-05 PROCEDURE — 25000003 PHARM REV CODE 250

## 2024-11-05 PROCEDURE — 25000003 PHARM REV CODE 250: Performed by: ANESTHESIOLOGY

## 2024-11-05 PROCEDURE — C1776 JOINT DEVICE (IMPLANTABLE): HCPCS | Performed by: ORTHOPAEDIC SURGERY

## 2024-11-05 PROCEDURE — 97535 SELF CARE MNGMENT TRAINING: CPT

## 2024-11-05 PROCEDURE — 0055T BONE SRGRY CMPTR CT/MRI IMAG: CPT | Mod: ,,, | Performed by: ORTHOPAEDIC SURGERY

## 2024-11-05 PROCEDURE — D9220A PRA ANESTHESIA: Mod: ANES,,, | Performed by: STUDENT IN AN ORGANIZED HEALTH CARE EDUCATION/TRAINING PROGRAM

## 2024-11-05 PROCEDURE — 99900035 HC TECH TIME PER 15 MIN (STAT)

## 2024-11-05 PROCEDURE — 36000711: Performed by: ORTHOPAEDIC SURGERY

## 2024-11-05 PROCEDURE — 63600175 PHARM REV CODE 636 W HCPCS: Performed by: NURSE ANESTHETIST, CERTIFIED REGISTERED

## 2024-11-05 PROCEDURE — 71000039 HC RECOVERY, EACH ADD'L HOUR: Performed by: ORTHOPAEDIC SURGERY

## 2024-11-05 PROCEDURE — 37000009 HC ANESTHESIA EA ADD 15 MINS: Performed by: ORTHOPAEDIC SURGERY

## 2024-11-05 DEVICE — PATELLA
Type: IMPLANTABLE DEVICE | Site: KNEE | Status: FUNCTIONAL
Brand: TRIATHLON

## 2024-11-05 DEVICE — PRIMARY TIBIAL BASEPLATE
Type: IMPLANTABLE DEVICE | Site: KNEE | Status: FUNCTIONAL
Brand: TRIATHLON

## 2024-11-05 DEVICE — TIBIAL BEARING INSERT
Type: IMPLANTABLE DEVICE | Site: KNEE | Status: FUNCTIONAL
Brand: TRIATHLON

## 2024-11-05 DEVICE — SIMPLEX® HV WITH GENTAMICIN IS A FAST-SETTING ACRYLIC RESIN WITH ADDITION OF GENTAMICIN SULFATE FOR USE IN BONE SURGERY. MIXING THE TWO SEPARATE STERILE COMPONENTS PRODUCES A DUCTILE BONE CEMENT WHICH, AFTER HARDENING, FIXES THE IMPLANT AND TRANSFERS STRESSES PRODUCED DURING MOVEMENT EVENLY TO THE BONE. SIMPLEX® HV WITH GENTAMICINN CEMENT POWDER ALSO CONTAINS INSOLUBLE ZIRCONIUM DIOXIDE AS AN X-RAY CONTRAST MEDIUM. SIMPLEX® HV WITH GENTAMICIN DOES NOT EMIT A SIGNAL AND DOES NOT POSE A SAFETY RISK IN A MAGNETIC RESONANCE ENVIRONMENT.
Type: IMPLANTABLE DEVICE | Site: KNEE | Status: FUNCTIONAL
Brand: SIMPLEX HV WITH GENTAMICIN

## 2024-11-05 DEVICE — CRUCIATE RETAINING FEMORAL
Type: IMPLANTABLE DEVICE | Site: KNEE | Status: FUNCTIONAL
Brand: TRIATHLON

## 2024-11-05 RX ORDER — BISACODYL 10 MG/1
10 SUPPOSITORY RECTAL EVERY 12 HOURS PRN
Status: DISCONTINUED | OUTPATIENT
Start: 2024-11-05 | End: 2024-11-06 | Stop reason: HOSPADM

## 2024-11-05 RX ORDER — SUCRALFATE 1 G/10ML
1 SUSPENSION ORAL EVERY 6 HOURS
Status: DISCONTINUED | OUTPATIENT
Start: 2024-11-05 | End: 2024-11-06 | Stop reason: HOSPADM

## 2024-11-05 RX ORDER — BUPROPION HYDROCHLORIDE 150 MG/1
150 TABLET ORAL DAILY
Status: DISCONTINUED | OUTPATIENT
Start: 2024-11-06 | End: 2024-11-06 | Stop reason: HOSPADM

## 2024-11-05 RX ORDER — DEXAMETHASONE SODIUM PHOSPHATE 4 MG/ML
INJECTION, SOLUTION INTRA-ARTICULAR; INTRALESIONAL; INTRAMUSCULAR; INTRAVENOUS; SOFT TISSUE
Status: DISCONTINUED | OUTPATIENT
Start: 2024-11-05 | End: 2024-11-05

## 2024-11-05 RX ORDER — FAMOTIDINE 20 MG/1
20 TABLET, FILM COATED ORAL 2 TIMES DAILY
Status: DISCONTINUED | OUTPATIENT
Start: 2024-11-05 | End: 2024-11-06 | Stop reason: HOSPADM

## 2024-11-05 RX ORDER — LIDOCAINE HYDROCHLORIDE 10 MG/ML
1 INJECTION, SOLUTION EPIDURAL; INFILTRATION; INTRACAUDAL; PERINEURAL
Status: DISCONTINUED | OUTPATIENT
Start: 2024-11-05 | End: 2024-11-05 | Stop reason: HOSPADM

## 2024-11-05 RX ORDER — DOCUSATE SODIUM 100 MG
400 CAPSULE ORAL
Status: DISCONTINUED | OUTPATIENT
Start: 2024-11-05 | End: 2024-11-06 | Stop reason: HOSPADM

## 2024-11-05 RX ORDER — ATORVASTATIN CALCIUM 40 MG/1
80 TABLET, FILM COATED ORAL DAILY
Status: DISCONTINUED | OUTPATIENT
Start: 2024-11-06 | End: 2024-11-06 | Stop reason: HOSPADM

## 2024-11-05 RX ORDER — PROPOFOL 10 MG/ML
VIAL (ML) INTRAVENOUS CONTINUOUS PRN
Status: DISCONTINUED | OUTPATIENT
Start: 2024-11-05 | End: 2024-11-05

## 2024-11-05 RX ORDER — GLUCAGON 1 MG
1 KIT INJECTION
Status: DISCONTINUED | OUTPATIENT
Start: 2024-11-05 | End: 2024-11-05 | Stop reason: HOSPADM

## 2024-11-05 RX ORDER — PROPOFOL 10 MG/ML
VIAL (ML) INTRAVENOUS
Status: DISCONTINUED | OUTPATIENT
Start: 2024-11-05 | End: 2024-11-05

## 2024-11-05 RX ORDER — OXYCODONE HYDROCHLORIDE 10 MG/1
10 TABLET ORAL
Status: DISCONTINUED | OUTPATIENT
Start: 2024-11-05 | End: 2024-11-06 | Stop reason: HOSPADM

## 2024-11-05 RX ORDER — POLYETHYLENE GLYCOL 3350 17 G/17G
17 POWDER, FOR SOLUTION ORAL DAILY
Status: DISCONTINUED | OUTPATIENT
Start: 2024-11-06 | End: 2024-11-06 | Stop reason: HOSPADM

## 2024-11-05 RX ORDER — LOSARTAN POTASSIUM 25 MG/1
50 TABLET ORAL DAILY
Status: DISCONTINUED | OUTPATIENT
Start: 2024-11-05 | End: 2024-11-06 | Stop reason: HOSPADM

## 2024-11-05 RX ORDER — OXYCODONE HYDROCHLORIDE 5 MG/1
5 TABLET ORAL
Status: DISCONTINUED | OUTPATIENT
Start: 2024-11-05 | End: 2024-11-06 | Stop reason: HOSPADM

## 2024-11-05 RX ORDER — PREGABALIN 75 MG/1
75 CAPSULE ORAL NIGHTLY
Status: DISCONTINUED | OUTPATIENT
Start: 2024-11-05 | End: 2024-11-06 | Stop reason: HOSPADM

## 2024-11-05 RX ORDER — PROCHLORPERAZINE EDISYLATE 5 MG/ML
5 INJECTION INTRAMUSCULAR; INTRAVENOUS EVERY 6 HOURS PRN
Status: DISCONTINUED | OUTPATIENT
Start: 2024-11-05 | End: 2024-11-06 | Stop reason: HOSPADM

## 2024-11-05 RX ORDER — ACETAMINOPHEN 500 MG
1000 TABLET ORAL
Status: COMPLETED | OUTPATIENT
Start: 2024-11-05 | End: 2024-11-05

## 2024-11-05 RX ORDER — METOPROLOL SUCCINATE 25 MG/1
25 TABLET, EXTENDED RELEASE ORAL DAILY
Status: DISCONTINUED | OUTPATIENT
Start: 2024-11-06 | End: 2024-11-06 | Stop reason: HOSPADM

## 2024-11-05 RX ORDER — ALUMINUM HYDROXIDE, MAGNESIUM HYDROXIDE, AND SIMETHICONE 1200; 120; 1200 MG/30ML; MG/30ML; MG/30ML
30 SUSPENSION ORAL
Status: DISCONTINUED | OUTPATIENT
Start: 2024-11-05 | End: 2024-11-06 | Stop reason: HOSPADM

## 2024-11-05 RX ORDER — OXYCODONE HYDROCHLORIDE 5 MG/1
5 TABLET ORAL
Status: DISCONTINUED | OUTPATIENT
Start: 2024-11-05 | End: 2024-11-05 | Stop reason: HOSPADM

## 2024-11-05 RX ORDER — SODIUM CHLORIDE 0.9 % (FLUSH) 0.9 %
10 SYRINGE (ML) INJECTION
Status: DISCONTINUED | OUTPATIENT
Start: 2024-11-05 | End: 2024-11-05 | Stop reason: HOSPADM

## 2024-11-05 RX ORDER — FUROSEMIDE 20 MG/1
20 TABLET ORAL 2 TIMES DAILY
Status: DISCONTINUED | OUTPATIENT
Start: 2024-11-05 | End: 2024-11-05

## 2024-11-05 RX ORDER — AMOXICILLIN 250 MG
1 CAPSULE ORAL 2 TIMES DAILY
Status: DISCONTINUED | OUTPATIENT
Start: 2024-11-05 | End: 2024-11-06 | Stop reason: HOSPADM

## 2024-11-05 RX ORDER — ONDANSETRON HYDROCHLORIDE 2 MG/ML
INJECTION, SOLUTION INTRAVENOUS
Status: DISCONTINUED | OUTPATIENT
Start: 2024-11-05 | End: 2024-11-05

## 2024-11-05 RX ORDER — ROPIVACAINE HYDROCHLORIDE 2 MG/ML
INJECTION, SOLUTION EPIDURAL; INFILTRATION; PERINEURAL CONTINUOUS
Status: DISCONTINUED | OUTPATIENT
Start: 2024-11-05 | End: 2024-11-06 | Stop reason: HOSPADM

## 2024-11-05 RX ORDER — FUROSEMIDE 20 MG/1
20 TABLET ORAL 2 TIMES DAILY
Status: DISCONTINUED | OUTPATIENT
Start: 2024-11-06 | End: 2024-11-06 | Stop reason: HOSPADM

## 2024-11-05 RX ORDER — ASPIRIN 81 MG/1
81 TABLET ORAL 2 TIMES DAILY
Status: DISCONTINUED | OUTPATIENT
Start: 2024-11-05 | End: 2024-11-06 | Stop reason: HOSPADM

## 2024-11-05 RX ORDER — SODIUM CHLORIDE 9 MG/ML
INJECTION, SOLUTION INTRAVENOUS CONTINUOUS
Status: DISCONTINUED | OUTPATIENT
Start: 2024-11-05 | End: 2024-11-05

## 2024-11-05 RX ORDER — MUPIROCIN 20 MG/G
1 OINTMENT TOPICAL
Status: COMPLETED | OUTPATIENT
Start: 2024-11-05 | End: 2024-11-05

## 2024-11-05 RX ORDER — PREGABALIN 75 MG/1
75 CAPSULE ORAL
Status: COMPLETED | OUTPATIENT
Start: 2024-11-05 | End: 2024-11-05

## 2024-11-05 RX ORDER — NAPROXEN SODIUM 220 MG/1
81 TABLET, FILM COATED ORAL 2 TIMES DAILY
Status: DISCONTINUED | OUTPATIENT
Start: 2024-11-05 | End: 2024-11-05

## 2024-11-05 RX ORDER — ASPIRIN 81 MG/1
81 TABLET ORAL 2 TIMES DAILY
Status: DISCONTINUED | OUTPATIENT
Start: 2024-11-05 | End: 2024-11-05

## 2024-11-05 RX ORDER — CEFAZOLIN 2 G/1
2 INJECTION, POWDER, FOR SOLUTION INTRAMUSCULAR; INTRAVENOUS
Status: COMPLETED | OUTPATIENT
Start: 2024-11-05 | End: 2024-11-06

## 2024-11-05 RX ORDER — ONDANSETRON HYDROCHLORIDE 2 MG/ML
4 INJECTION, SOLUTION INTRAVENOUS EVERY 8 HOURS PRN
Status: DISCONTINUED | OUTPATIENT
Start: 2024-11-05 | End: 2024-11-06 | Stop reason: HOSPADM

## 2024-11-05 RX ORDER — SODIUM CHLORIDE 9 MG/ML
INJECTION, SOLUTION INTRAVENOUS
Status: COMPLETED | OUTPATIENT
Start: 2024-11-05 | End: 2024-11-05

## 2024-11-05 RX ORDER — MIDAZOLAM HYDROCHLORIDE 1 MG/ML
4 INJECTION, SOLUTION INTRAMUSCULAR; INTRAVENOUS
Status: DISCONTINUED | OUTPATIENT
Start: 2024-11-05 | End: 2024-11-05 | Stop reason: HOSPADM

## 2024-11-05 RX ORDER — NALOXONE HCL 0.4 MG/ML
0.02 VIAL (ML) INJECTION
Status: DISCONTINUED | OUTPATIENT
Start: 2024-11-05 | End: 2024-11-06 | Stop reason: HOSPADM

## 2024-11-05 RX ORDER — NAPROXEN SODIUM 220 MG/1
81 TABLET, FILM COATED ORAL ONCE
Status: DISCONTINUED | OUTPATIENT
Start: 2024-11-05 | End: 2024-11-05

## 2024-11-05 RX ORDER — FENTANYL CITRATE 50 UG/ML
100 INJECTION, SOLUTION INTRAMUSCULAR; INTRAVENOUS
Status: DISCONTINUED | OUTPATIENT
Start: 2024-11-05 | End: 2024-11-05 | Stop reason: HOSPADM

## 2024-11-05 RX ORDER — ACETAMINOPHEN 500 MG
1000 TABLET ORAL EVERY 6 HOURS
Status: DISCONTINUED | OUTPATIENT
Start: 2024-11-05 | End: 2024-11-06 | Stop reason: HOSPADM

## 2024-11-05 RX ORDER — FENTANYL CITRATE 50 UG/ML
25 INJECTION, SOLUTION INTRAMUSCULAR; INTRAVENOUS EVERY 5 MIN PRN
Status: DISCONTINUED | OUTPATIENT
Start: 2024-11-05 | End: 2024-11-05 | Stop reason: HOSPADM

## 2024-11-05 RX ORDER — KETAMINE HYDROCHLORIDE 100 MG/ML
INJECTION, SOLUTION INTRAMUSCULAR; INTRAVENOUS
Status: DISCONTINUED | OUTPATIENT
Start: 2024-11-05 | End: 2024-11-05

## 2024-11-05 RX ORDER — METHOCARBAMOL 750 MG/1
750 TABLET, FILM COATED ORAL 3 TIMES DAILY
Status: DISCONTINUED | OUTPATIENT
Start: 2024-11-05 | End: 2024-11-06 | Stop reason: HOSPADM

## 2024-11-05 RX ORDER — FAMOTIDINE 20 MG/1
40 TABLET, FILM COATED ORAL DAILY
Status: DISCONTINUED | OUTPATIENT
Start: 2024-11-06 | End: 2024-11-06 | Stop reason: HOSPADM

## 2024-11-05 RX ORDER — ONDANSETRON HYDROCHLORIDE 2 MG/ML
4 INJECTION, SOLUTION INTRAVENOUS DAILY PRN
Status: DISCONTINUED | OUTPATIENT
Start: 2024-11-05 | End: 2024-11-05 | Stop reason: HOSPADM

## 2024-11-05 RX ORDER — CEFAZOLIN 2 G/1
2 INJECTION, POWDER, FOR SOLUTION INTRAMUSCULAR; INTRAVENOUS
Status: COMPLETED | OUTPATIENT
Start: 2024-11-05 | End: 2024-11-05

## 2024-11-05 RX ADMIN — PROPOFOL 75 MCG/KG/MIN: 10 INJECTION, EMULSION INTRAVENOUS at 11:11

## 2024-11-05 RX ADMIN — OXYCODONE 5 MG: 5 TABLET ORAL at 02:11

## 2024-11-05 RX ADMIN — OXYCODONE HYDROCHLORIDE 10 MG: 10 TABLET ORAL at 05:11

## 2024-11-05 RX ADMIN — MUPIROCIN 1 G: 20 OINTMENT TOPICAL at 09:11

## 2024-11-05 RX ADMIN — CEFAZOLIN 2 G: 2 INJECTION, POWDER, FOR SOLUTION INTRAMUSCULAR; INTRAVENOUS at 10:11

## 2024-11-05 RX ADMIN — FENTANYL CITRATE 25 MCG: 50 INJECTION, SOLUTION INTRAMUSCULAR; INTRAVENOUS at 02:11

## 2024-11-05 RX ADMIN — SODIUM CHLORIDE: 9 INJECTION, SOLUTION INTRAVENOUS at 09:11

## 2024-11-05 RX ADMIN — SUCRALFATE 1 G: 1 SUSPENSION ORAL at 07:11

## 2024-11-05 RX ADMIN — METHOCARBAMOL TABLETS 750 MG: 750 TABLET, COATED ORAL at 10:11

## 2024-11-05 RX ADMIN — Medication: at 01:11

## 2024-11-05 RX ADMIN — KETAMINE HYDROCHLORIDE 10 MG: 100 INJECTION INTRAMUSCULAR; INTRAVENOUS at 11:11

## 2024-11-05 RX ADMIN — TRANEXAMIC ACID 1000 MG: 100 INJECTION INTRAVENOUS at 11:11

## 2024-11-05 RX ADMIN — PREGABALIN 75 MG: 75 CAPSULE ORAL at 10:11

## 2024-11-05 RX ADMIN — FENTANYL CITRATE 50 MCG: 50 INJECTION, SOLUTION INTRAMUSCULAR; INTRAVENOUS at 10:11

## 2024-11-05 RX ADMIN — MIDAZOLAM 1 MG: 1 INJECTION INTRAMUSCULAR; INTRAVENOUS at 10:11

## 2024-11-05 RX ADMIN — Medication 400 ML: at 09:11

## 2024-11-05 RX ADMIN — ACETAMINOPHEN 1000 MG: 500 TABLET ORAL at 05:11

## 2024-11-05 RX ADMIN — PREGABALIN 75 MG: 75 CAPSULE ORAL at 09:11

## 2024-11-05 RX ADMIN — ONDANSETRON 4 MG: 2 INJECTION INTRAMUSCULAR; INTRAVENOUS at 11:11

## 2024-11-05 RX ADMIN — Medication 400 ML: at 05:11

## 2024-11-05 RX ADMIN — MEPIVACAINE HYDROCHLORIDE 3.2 ML: 15 INJECTION, SOLUTION EPIDURAL; INFILTRATION at 11:11

## 2024-11-05 RX ADMIN — LOSARTAN POTASSIUM 50 MG: 25 TABLET, FILM COATED ORAL at 05:11

## 2024-11-05 RX ADMIN — ACETAMINOPHEN 1000 MG: 500 TABLET ORAL at 09:11

## 2024-11-05 RX ADMIN — FENTANYL CITRATE 25 MCG: 50 INJECTION, SOLUTION INTRAMUSCULAR; INTRAVENOUS at 03:11

## 2024-11-05 RX ADMIN — TRANEXAMIC ACID 1000 MG: 100 INJECTION INTRAVENOUS at 01:11

## 2024-11-05 RX ADMIN — METHOCARBAMOL TABLETS 750 MG: 750 TABLET, COATED ORAL at 02:11

## 2024-11-05 RX ADMIN — ASPIRIN 81 MG: 81 TABLET, COATED ORAL at 10:11

## 2024-11-05 RX ADMIN — PROPOFOL 30 MG: 10 INJECTION, EMULSION INTRAVENOUS at 11:11

## 2024-11-05 RX ADMIN — FAMOTIDINE 20 MG: 20 TABLET, FILM COATED ORAL at 10:11

## 2024-11-05 RX ADMIN — SODIUM CHLORIDE: 9 INJECTION, SOLUTION INTRAVENOUS at 12:11

## 2024-11-05 RX ADMIN — VANCOMYCIN HYDROCHLORIDE 1500 MG: 1.5 INJECTION, POWDER, LYOPHILIZED, FOR SOLUTION INTRAVENOUS at 09:11

## 2024-11-05 RX ADMIN — DEXAMETHASONE SODIUM PHOSPHATE 8 MG: 4 INJECTION, SOLUTION INTRAMUSCULAR; INTRAVENOUS at 11:11

## 2024-11-05 RX ADMIN — CEFAZOLIN 2 G: 2 INJECTION, POWDER, FOR SOLUTION INTRAMUSCULAR; INTRAVENOUS at 11:11

## 2024-11-05 RX ADMIN — SENNOSIDES AND DOCUSATE SODIUM 1 TABLET: 50; 8.6 TABLET ORAL at 10:11

## 2024-11-05 NOTE — PLAN OF CARE
Problem: Physical Therapy  Goal: Physical Therapy Goal  Description: Goals to be met by: 24     Patient will increase functional independence with mobility by performin. Supine to sit with Supervision  2. Sit to stand transfer with Stand-by Assistance  3. Gait  x 150 feet with Stand-by Assistance using Rolling Walker.   4. Ascend/descend 4 stair with bilateral Handrails Contact Guard Assistance using No Assistive Device.   5. Lower extremity TKA home exercise program x 10 reps per handout, with supervision    Patient demonstrates a mobility limitation that significantly impairs their ability to participate in one or more mobility related activities of daily living. Patient's mobility limitation cannot be sufficiently resolved with the use of a cane, but can be sufficiently resolved with the use of a rolling walker.The use of a rolling walker will considerably improve their ability to participate in MRADLs. Patient will use the walker on a regular basis at home.       Outcome: Progressing

## 2024-11-05 NOTE — PLAN OF CARE
Problem: Adult Inpatient Plan of Care  Goal: Plan of Care Review  Outcome: Progressing  Flowsheets (Taken 11/5/2024 1745)  Plan of Care Reviewed With: patient     Problem: Adult Inpatient Plan of Care  Goal: Patient-Specific Goal (Individualized)  Outcome: Progressing  Flowsheets (Taken 11/5/2024 1745)  Individualized Care Needs: Pain management     Problem: Pain Acute  Goal: Optimal Pain Control and Function  Intervention: Develop Pain Management Plan  Flowsheets (Taken 11/5/2024 1745)  Pain Management Interventions:   care clustered   breathing exercises utilized   position adjusted   quiet environment facilitated   relaxation techniques promoted   warm blanket provided   cold applied     Problem: Pain Acute  Goal: Optimal Pain Control and Function  Intervention: Prevent or Manage Pain  Flowsheets (Taken 11/5/2024 1745)  Medication Review/Management:   medications reviewed   high-risk medications identified     Problem: Fall Injury Risk  Goal: Absence of Fall and Fall-Related Injury  Intervention: Identify and Manage Contributors  Flowsheets (Taken 11/5/2024 1745)  Medication Review/Management:   medications reviewed   high-risk medications identified     Problem: Fall Injury Risk  Goal: Absence of Fall and Fall-Related Injury  Intervention: Promote Injury-Free Environment  Flowsheets (Taken 11/5/2024 1745)  Safety Promotion/Fall Prevention:   assistive device/personal item within reach   Fall Risk reviewed with patient/family   family expresses understanding of fall risk and prevention   high risk medications identified   lighting adjusted   instructed to call staff for mobility   medications reviewed   nonskid shoes/socks when out of bed   side rails raised x 2   Supervised toileting - stay within arms reach     Problem: Knee Arthroplasty  Goal: Absence of Bleeding  Intervention: Monitor and Manage Bleeding  Flowsheets (Taken 11/5/2024 1745)  Bleeding Management: dressing monitored     Problem: Knee  Arthroplasty  Goal: Nausea and Vomiting Relief  Intervention: Prevent or Manage Nausea and Vomiting  Flowsheets (Taken 11/5/2024 1745)  Nausea/Vomiting Interventions:   nausea triggers minimized   stimuli minimized     Problem: Comorbidity Management  Goal: Blood Pressure in Desired Range  Intervention: Maintain Blood Pressure Management  Flowsheets (Taken 11/5/2024 1745)  Medication Review/Management:   medications reviewed   high-risk medications identified     Plan of care reviewed with patient, verbalized understanding. Medications reviewed and given as ordered. Rounding for safety and patient care per policy. Safety precautions maintained. Call light within reach, bed wheels locked, bed in lowest position, side rails up x2, patient instructed to call for assistance, DME at bedside.

## 2024-11-05 NOTE — INTERVAL H&P NOTE
Sandra Lui was interviewed, examined and the H and P reviewed.  There has been no interval change in his History and Physical.

## 2024-11-05 NOTE — OP NOTE
DATE OF PROCEDURE:  11/5/24.     PREOPERATIVE DIAGNOSIS:  Arthritis, right knee.     POSTOPERATIVE DIAGNOSIS:  Arthritis, right knee.     PROCEDURES PERFORMED:  Robotically-assisted right total knee arthroplasty.     SURGEON:  Sha Duran M.D.     ASSISTANT:  ESDRAS Leone     ANESTHESIA:  Regional.     COMPLICATIONS:  None.     COUNTS:  Correct.     DISPOSITION:  Recovery Room, stable.     SPECIMENS:  Bone and cartilage.     FINDINGS:  Arthritis.     FLUIDS:  2000 mL.     ESTIMATED BLOOD LOSS:  <50cc     IMPLANTS:  Joshua Triathlon size 4 right  Triathlon cruciate retaining femoral component and a size 5 primary tibial baseplate, 33 mm patella and a size 5, 9 mm   CS tibial insert.     INDICATIONS FOR PROCEDURE:   Sandra Lui  is a 74-year-old female who is   having symptoms of right knee pain.  Physical examination and imaging studies   were consistent with arthritis.Treatment options were explained and it was decided   to proceed with robotically-assisted right total knee arthroplasty.  She was   aware of reasonable treatment options as well as risks and benefits.       PROCEDURE IN DETAIL:  After appropriate consent was obtained, the patient   brought in the Operating Room, anesthesia was administered.  She received   antibiotic prophylaxis.  Cast padding and tourniquet was applied to the proximal  right thigh.  right lower extremity was then prepped and draped in usual sterile   fashion.  The leg murphy was applied.  Timeout was called.  Limb was elevated   and tourniquet was inflated.  The knee was flexed.  An incision was made from   the tibial tubercle just proximal to the superior pole of the patella.  It was   taken down through the skin and retinacular.  A medial parapatellar arthrotomy   was performed followed by a medial release.  Following this ACL was resected, fat pad   was excised.  The patella was everted.  It was measured and found to be 24 mm,  9 mm of bone removed and the 3 peg holes  were drilled for the 33 mm patella.    Following this, 2 stab incisions were made 4 fingerbreadths below the tibial   tubercle on the medial aspect of the tibial crest.  The 2 guide pins were placed   along with the fixation device through these.  The array was applied and   tightened to the clamp. We checked the security of the array and it was fine. Following this, we placed the femoral pins 1 cm superior and anterior to the MCL insertion.  The check point was placed in between the pins. The guide clamp and array were secured..  Once this was accomplished, the hip center was obtained, the   medial and lateral malleoli were demarcated.  The femoral check point and tibial   check point were placed and the femur and tibia were then registered.    Acceptable registration was obtained.  Osteophytes were removed. We then captured poses in extension,   And approximately 90 degrees of flexion.  Initial alignment was 6.5 degrees varus and 9  Degrees flexion .  The  initial gaps were   then obtained. The extension gaps were 15 medially and 19.5 laterally.  The flexion gaps were 14.5 medially and 17.5 laterally.  Component   position was adjusted.  We were very satisfied with the component position and   sizing.  We had a size 4 femur and a 5 tibia.  Once this was accomplished, the   robotic arm was brought in and our cuts were made.  The tibia was cut 1st.  We then cut the anterior femur anterior chamfer and posterior femur.  The saw blade was then switched and we made our distal and posterior chamfer cut.  We were very satisfied all the cuts.  Bone fragments were removed.  Lamina  was used to open up posteriorly and we removed any remaining osteophytes and meniscal remnants. The PCL was intact and   robust.  We placed the tibial trial.  We had good coverage with this.  We used   the medial one-third of the tibial tubercle to guide rotation and the trial was pinned in   place.  A 9 mm insert was placed and then the  femoral component was placed.    This was centered on the femur and the knee was brought through a range of   motion.  She was very well balanced in flexion with an 18 mm gaps medially and a 18.5mm gap  laterally.  In extension, there was an 18.5 mm gap medially 19 mm gap laterally.    Clinically,there was excellent balance and stability.  Final alignment was 3 degrees flexion and 3.5 degrees varus. Trial   patellar button was placed.  Patella tracked well.  Therefore, at this point, we   were satisfied with knee range of motion and stability, component position,   sizing and alignment as well as patella tracking.  It should be noted that she  had full extension and he had at least 130 degrees of flexion and there was no   instability at full extension, midflexion, or deep flexion.  Trial components   were removed.  Arrays and femoral and tibial pins were removed. The bone was then prepared for cementing for pulsatile lavage and   drying. Components were then cemented into   place. Tibia followed by femur.  Cement was applied to the tibial keel as   well.  Excess cement was removed.  The tibial insert was firmly seated.  The knee was inspected.  There was no loose body, foreign body, or soft tissue interposition.  It was   then reduced.  Patella button was cemented in place.  Once the cement was dry,   the knee was irrigated with Betadine solution.  Following this, it was irrigated   with pulsatile lavage.  This was periodically done throughout the closure.  The   incision was then closed.  The arthrotomy was closed with #1 Vicryl.  Once the   arthrotomy was closed, the knee was brought through range of motion and was   stable as previously described and the patella tracked well.  The remainder of   the incision was closed with 0 Vicryl, 2-0 Vicryl, Monocryl, and Dermabond.    The stab incisions were closed with Vicryl and Dermabond.  It should be noted   that all check points were removed as well as the femoral and  tibial pins.    Sterile dressing was applied.  She was transferred from the Operating Room table   to a stretcher, brought to Recovery Room in stable condition.  She tolerated the   procedure well and there were no known complications. A gram of IV tranexamic acid was received prior to incision and at closure.

## 2024-11-05 NOTE — ASSESSMENT & PLAN NOTE
74 y.o. female POD1 s/p R TKA    Pain control: MM  PT/OT: WBAT RLE, nothing behind the knee  DVT PPx: ASA 81 BID, FCDs at all times when not ambulating  Abx: postop Ancef  Labs: none  Drain: none  Morales: none    Dispo: Outpatient PT; encourage PT/OT

## 2024-11-05 NOTE — PLAN OF CARE
Problem: Occupational Therapy  Goal: Occupational Therapy Goal  Description: Goals to be met by: 11/6/24     Patient will increase functional independence with ADLs by performing:    UE Dressing with Stand-by Assistance.  LE Dressing with Stand-by Assistance.  Grooming while standing at sink with Stand-by Assistance.  Toileting from toilet with Stand-by Assistance for hygiene and clothing management.   Toilet transfer to toilet with Stand-by Assistance.    Outcome: Progressing     OT eval completed and goals established.

## 2024-11-05 NOTE — ANESTHESIA PROCEDURE NOTES
Spinal    Diagnosis: total knee arthroplasty  Patient location during procedure: OR  Start time: 11/5/2024 11:21 AM  Timeout: 11/5/2024 11:20 AM  End time: 11/5/2024 11:26 AM    Staffing  Authorizing Provider: Rolo Gomez MD  Performing Provider: Rolo Gomez MD    Staffing  Performed by: Rolo Gomez MD  Authorized by: Rolo Gomez MD    Preanesthetic Checklist  Completed: patient identified, IV checked, site marked, risks and benefits discussed, surgical consent, monitors and equipment checked, pre-op evaluation and timeout performed  Spinal Block  Patient position: sitting  Prep: ChloraPrep  Patient monitoring: heart rate, cardiac monitor, continuous pulse ox and frequent blood pressure checks  Approach: midline  Location: L4-5  Injection technique: single shot  CSF Fluid: clear free-flowing CSF  Needle  Needle type: Joseph   Needle localization: anatomical landmarks  Assessment  Ease of block: moderate  Patient's tolerance of the procedure: comfortable throughout block and no complaints  Medications:    Medications: mepivacaine (CARBOCAINE) injection 15 mg/mL (1.5%) - Other   3.2 mL - 11/5/2024 11:25:00 AM

## 2024-11-05 NOTE — PLAN OF CARE
Pre-op complete. Waiting on site toi, H&P update, and anesthesia consent. Daughter at bedside. Bed locked in lowest position with call bell within reach.

## 2024-11-05 NOTE — HPI
CC:  Right knee pain     Sandra Lui is a 74 y.o. female with history of Right knee pain. Pain is worse with activity and weight bearing.  Patient has experienced interference of activities of daily living due to decreased range of motion and an increase in joint pain and swelling.  Patient has failed non-operative treatment including NSAIDs, corticosteroid injections, viscosupplement injections, and activity modification.  Sandra Lui currently ambulates using assistive device .      Relevant medical conditions of significance in perioperative period:  History of TIA- on medication managed by PCP  HTN- on medication managed by PCP  HLD- on medication managed by PCP  CKD 3a- monitored by PCP         Given documented medical comorbidities including those listed above and based off of CMS criteria patient would meet inpatient admission status guidelines. This case has been approved as inpatient.

## 2024-11-05 NOTE — PT/OT/SLP EVAL
"Physical Therapy Evaluation and Treatment    Patient Name: Sandra Lui   MRN: [id number]  Recent Surgery: Procedure(s) (LRB):  ARTHROPLASTY, KNEE, TOTAL, OCTAVIO COMPUTER-ASSISTED NAVIGATION: RIGHT: SAME DAY (Right) Day of Surgery    Recommendations:     Discharge Recommendations: Low Intensity Therapy   Discharge Equipment Recommendations: bedside commode, shower chair, walker, rolling   Barriers to discharge: None    Assessment:     Sandra Lui is a 74 y.o. female admitted with a medical diagnosis of Primary osteoarthritis of right knee. She presents with the following impairments/functional limitations: weakness, impaired endurance, impaired self care skills, impaired functional mobility, gait instability, impaired balance, decreased lower extremity function, pain, decreased ROM, edema. Pt presents s/p R TKA with expected post-op deficits.  Pt did really well with PT today and was able to amb 75' with RW and CGA .   She had no LOB and no dizziness, amb with very slow eva and decrease step length. She will benefit from cont PT to maximize  functional recovery, strength and ROM.      Rehab Prognosis: Good; patient would benefit from acute PT services to address these deficits and reach maximum level of function.    Plan:     During this hospitalization, patient to be seen daily to address the above listed problems via gait training, therapeutic activities, therapeutic exercises    Plan of Care Expires: 12/05/24    Subjective     Chief Complaint:  The pain is a 40 out of ten but that's better than it was".  "It's not as bad as I thought it would be" when ambulating.  Patient Comments/Goals: to be able to go out and wear good shoes instead of tennis shoes  Pain/Comfort:  Pain Rating 1:  (40/10 per pt as she laughed)  Location - Side 1: Right  Location 1: knee  Pain Addressed 1: Pre-medicate for activity, Reposition, Distraction  Pain Rating Post-Intervention 1:  ("tolerable")    Social History:  Living " Environment: Patient lives alone in a single story home with number of outside stair(s): 4 XIANG with B rails and tub-shower combo  Her dtr will stay with her after d/c.  Prior Level of Function: Prior to admission, patient ambulated community distances using straight cane  Equipment Used at Home: cane, straight  DME owned (not currently used): single point cane  Assistance Upon Discharge:  her dtr    Objective:     Communicated with RN and OT prior to session. Patient found up in chair with FCD, cryotherapy, perineural catheter, peripheral IV upon PT entry to room.  Pt's dtr present in room  but left at beginning of PT session.    General Precautions: Standard, fall   Orthopedic Precautions: RLE weight bearing as tolerated   Braces: N/A    Respiratory Status: Room air    Exams:  RLE ROM: WFL except decrease knee flex/ext due to pain and post op bandages  RLE Strength:  at least 3/5  LLE ROM: WNL  LLE Strength: WNL  Cognitive: Patient is oriented to Person, Place, Time, Situation  Gross Motor Coordination: WFL  Postural Exam: Patient presented with the following abnormalities:    -       Rounded shoulders  -       Forward head  Sensation:    -       Intact    Functional Mobility:  Gait belt applied - Yes  Bed Mobility  Seated in chair at start of session and returned to chair  Transfers  Sit to Stand: minimum assistance with rolling walker and with cues for hand placement and foot placement  Gait  Patient ambulated 75 feet with rolling walker and contact guard assistance. Patient required cues for heel strike, position in walker, sequencing, upright posture, and weight bearing status to increase independence and safety. Patient required cues ~ 25% of the time.  Pt amb with decrease eva and step length, increase time for task but no LOB or dizziness during gait training.  Balance  Sitting: GOOD: Maintains balance through MODERATE excursions of active trunk movement  Standing: FAIR: Needs CONTACT GUARD during  gait    Therapeutic Activities and Exercises:  Patient educated on role of acute care PT and PT POC, safety while in hospital including calling nurse for mobility, and call light usage.  Educated about weightbearing as bonnie and provided cuing for adherence as appropriate during session.  Educated about importance of OOB mobility and remaining up in chair most of the day.  Pt ed on use of cryotherapy for edema and pain control, and on safety awareness with use of RW in the home and pt verbalized good understanding back to PT.     AM-PAC 6 CLICK MOBILITY  Total Score:18    Patient left up in chair with all lines intact, call button in reach, and RN notified.    GOALS:   Multidisciplinary Problems       Physical Therapy Goals          Problem: Physical Therapy    Goal Priority Disciplines Outcome Interventions   Physical Therapy Goal     PT, PT/OT Progressing    Description: Goals to be met by: 24     Patient will increase functional independence with mobility by performin. Supine to sit with Supervision  2. Sit to stand transfer with Stand-by Assistance  3. Gait  x 150 feet with Stand-by Assistance using Rolling Walker.   4. Ascend/descend 4 stair with bilateral Handrails Contact Guard Assistance using No Assistive Device.   5. Lower extremity TKA home exercise program x 10 reps per handout, with supervision    Patient demonstrates a mobility limitation that significantly impairs their ability to participate in one or more mobility related activities of daily living. Patient's mobility limitation cannot be sufficiently resolved with the use of a cane, but can be sufficiently resolved with the use of a rolling walker.The use of a rolling walker will considerably improve their ability to participate in MRADLs. Patient will use the walker on a regular basis at home.                            History:     Past Medical History:   Diagnosis Date    Arthritis     Asthma     Depression     Disorder of kidney and  ureter     GERD (gastroesophageal reflux disease)     Gout     Hyperlipidemia     Hypertension     Osteoporosis        Past Surgical History:   Procedure Laterality Date    ANKLE SURGERY      Cataract surgeries both sides around 2017      CHOLECYSTECTOMY      HYSTERECTOMY      TONSILLECTOMY      TUBAL LIGATION         Time Tracking:     PT Received On: 11/05/24  PT Start Time: 1652  PT Stop Time: 1719  PT Total Time (min): 27 min     Billable Minutes: Evaluation 10 and Gait Training 17    11/5/2024

## 2024-11-05 NOTE — NURSING
Patient arrived to unit AAOx3, In stretcher from PACU and stood and pivoted to hospital bed in room. VSS and IVF infusing. Dressing to right knee, CDI. See assessment. Patient oriented to room. Bed in lowest position, side rails up x2, call light within reach, patient instructed to call for assistance, verbalized understanding. Will continue to monitor.     Nurses Note -- 4 Eyes      11/5/2024   4:28 PM      Skin assessed during: Admit      [x] No Altered Skin Integrity Present    []Prevention Measures Documented      [] Yes- Altered Skin Integrity Present or Discovered   [] LDA Added if Not in Epic (Describe Wound)   [] New Altered Skin Integrity was Present on Admit and Documented in LDA   [] Wound Image Taken    Wound Care Consulted? No    Attending Nurse:  Lara Avalos RN/Staff Member:   CRISTINE Lemus

## 2024-11-05 NOTE — NURSING TRANSFER
Nursing Transfer Note      11/5/2024   3:59 PM    Nurse giving handoff: Nguyen COLUNGA  Nurse receiving handoff: Lara COLUNGA    Reason patient is being transferred: overnight stay    Transfer From: PACU    Transfer via stretcher    Transfer with  to O2    Transported by [facility]      Patient belongings transferred with patient: Yes    Chart send with patient: Yes    Notified: daughter    VSS. Pt able to tolerate oral liquids. Pt reports tolerable pain level for transfer. Ice pack and dressing intact. FCDs intact. Nimbus infusion pump infusing. Pt to be transferred via bed to recovery suites. Pt stable for transfer. No distress noted.

## 2024-11-05 NOTE — TRANSFER OF CARE
"Anesthesia Transfer of Care Note    Patient: Sandra Lui    Procedure(s) Performed: Procedure(s) (LRB):  ARTHROPLASTY, KNEE, TOTAL, OCTAVIO COMPUTER-ASSISTED NAVIGATION: RIGHT: SAME DAY (Right)    Patient location: PACU    Anesthesia Type: spinal and general    Transport from OR: Transported from OR on 6-10 L/min O2 by face mask with adequate spontaneous ventilation    Post pain: adequate analgesia    Post assessment: no apparent anesthetic complications    Post vital signs: stable    Level of consciousness: awake, oriented and alert    Nausea/Vomiting: no nausea/vomiting    Complications: none    Transfer of care protocol was followed    Last vitals: Visit Vitals  BP (!) 165/74 (BP Location: Left arm, Patient Position: Lying)   Pulse 62   Temp 36.4 °C (97.5 °F) (Temporal)   Resp (!) 24   Ht 5' 7" (1.702 m)   Wt 115.7 kg (255 lb)   SpO2 98%   Breastfeeding No   BMI 39.94 kg/m²     "

## 2024-11-05 NOTE — HOSPITAL COURSE
On 11/5/24, the patient arrived to the [facility] for proper pre-operative management.  Upon completion of pre-operative preparation, the patient was taken back to the operative theatre. R TKA was performed without complication and the patient was transported to the post anesthesia care unit in stable condition.  After appropriate recovery from the anaesthetic agents used during the surgery, the patient was then transported to the hospital inpatient floor.  The interim of the hospital stay from arrival on the floor up to discharge has been uncomplicated. The patient has tolerated regular diet.  The patient's pain has been controlled using a multimodal approach. Currently, the patient's pain is well controlled on an oral regimen.  The patient has been voiding without difficulty.  The patient began participation in physical therapy after surgery and has progressed throughout the entire hospital stay.  Currently, the patient's progress is sufficient to allow the them to be discharged to home safely.  The patient agrees with this assessment and desires a discharge today.

## 2024-11-05 NOTE — ANESTHESIA PREPROCEDURE EVALUATION
11/05/2024  Pre-operative evaluation for Procedure(s) (LRB):  ARTHROPLASTY, KNEE, TOTAL, OCTAVIO COMPUTER-ASSISTED NAVIGATION: RIGHT: SAME DAY (Right)    Sandra Lui is a 74 y.o. female     Patient Active Problem List   Diagnosis    Primary osteoarthritis of right knee    TIA (transient ischemic attack)    BMI 39.0-39.9,adult    Myofascial pain    Chronic neck pain    Chronic pain of both shoulders    Polymyalgia    Decreased range of motion (ROM) of right knee    Sleep apnea    Primary hypertension    Asthma    Prediabetes    CKD (chronic kidney disease) stage 3, GFR 30-59 ml/min    Acid reflux    Edema    Depression    Gout    Allergies    HLD (hyperlipidemia)    History of COVID-19    Chronic midline low back pain without sciatica    Constipation    H/O total hysterectomy    Osteoporosis       Review of patient's allergies indicates:   Allergen Reactions    Adhesive      Skin peeling    Erythromycin      Vomiting, weakness, nausea    Neosporin (neomycin-polymyx) Rash       No current facility-administered medications on file prior to encounter.     Current Outpatient Medications on File Prior to Encounter   Medication Sig Dispense Refill    acetaminophen (TYLENOL) 500 MG tablet Take 500 mg by mouth once daily.      ALBUTEROL, REFILL, INHL Inhale into the lungs. 2 puffs as needed      allopurinoL (ZYLOPRIM) 100 MG tablet Take 100 mg by mouth once daily.      aspirin (ECOTRIN) 81 MG EC tablet Take 81 mg by mouth once daily.      calcium carbonate (OS-NOBLE) 600 mg calcium (1,500 mg) Tab Take 1,200 mg by mouth once daily.      colchicine (COLCRYS) 0.6 mg tablet Take 0.6 mg by mouth daily as needed.      famotidine (PEPCID) 40 MG tablet Take 40 mg by mouth once daily.      metoprolol succinate (TOPROL-XL) 25 MG 24 hr tablet Take 25 mg by mouth once daily.      omega-3 fatty acids/fish oil (FISH OIL-OMEGA-3  "FATTY ACIDS) 300-1,000 mg capsule Take by mouth once daily.      traMADoL (ULTRAM) 50 mg tablet Take 50 mg by mouth every 12 (twelve) hours.      vitamin D (VITAMIN D3) 1000 units Tab Take 1,000 Units by mouth once daily.      clobetasoL (TEMOVATE) 0.05 % external solution Apply 1 mL topically once daily.      ergocalciferol, vitamin D2, (VITAMIN D ORAL) Take by mouth once daily.      fluorometholone 0.1% (FML) 0.1 % DrpS Place 1 drop into both eyes Daily.      ipratropium (ATROVENT) 42 mcg (0.06 %) nasal spray 2 sprays by Each Nostril route as needed.      latanoprost 0.005 % ophthalmic solution Place 1 drop into both eyes every evening.         Past Surgical History:   Procedure Laterality Date    ANKLE SURGERY      Cataract surgeries both sides around 2017      CHOLECYSTECTOMY      HYSTERECTOMY      TONSILLECTOMY      TUBAL LIGATION         Social History     Socioeconomic History    Marital status: Single   Tobacco Use    Smoking status: Never    Smokeless tobacco: Never   Substance and Sexual Activity    Alcohol use: No    Drug use: No     Social Drivers of Health     Financial Resource Strain: Medium Risk (8/16/2024)    Overall Financial Resource Strain (CARDIA)     Difficulty of Paying Living Expenses: Somewhat hard   Food Insecurity: Food Insecurity Present (8/16/2024)    Hunger Vital Sign     Worried About Running Out of Food in the Last Year: Sometimes true     Ran Out of Food in the Last Year: Sometimes true   Physical Activity: Inactive (8/16/2024)    Exercise Vital Sign     Days of Exercise per Week: 0 days     Minutes of Exercise per Session: 10 min   Stress: No Stress Concern Present (8/16/2024)    [employer] [place] [employer] - Occupational Stress Questionnaire     Feeling of Stress : Only a little   Housing Stability: Unknown (8/16/2024)    Housing Stability Vital Sign     Unable to Pay for Housing in the Last Year: No         CBC: No results for input(s): "WBC", "RBC", "HGB", "HCT", " ""PLT", "MCV", "MCH", "MCHC" in the last 72 hours.    CMP: No results for input(s): "NA", "K", "CL", "CO2", "BUN", "CREATININE", "GLU", "MG", "PHOS", "CALCIUM", "ALBUMIN", "PROT", "ALKPHOS", "ALT", "AST", "BILITOT" in the last 72 hours.    INR  No results for input(s): "PT", "INR", "PROTIME", "APTT" in the last 72 hours.        Diagnostic Studies:      EKD Echo:  No results found for this or any previous visit.       Pre-op Assessment    I have reviewed the Patient Summary Reports.     I have reviewed the Nursing Notes. I have reviewed the NPO Status.   I have reviewed the Medications.     Review of Systems  Cardiovascular:     Hypertension                                          Pulmonary:    Asthma    Sleep Apnea                Renal/:  Chronic Renal Disease                Hepatic/GI:     GERD                Musculoskeletal:  Arthritis               Neurological:  TIA,                                         Physical Exam  General: Well nourished and Cooperative    Airway:  Mallampati: II   Mouth Opening: Normal  TM Distance: Normal  Tongue: Normal  Neck ROM: Normal ROM    Chest/Lungs:  Clear to auscultation, Normal Respiratory Rate    Heart:  Rate: Normal  Rhythm: Regular Rhythm  Sounds: Normal        Anesthesia Plan  Type of Anesthesia, risks & benefits discussed:    Anesthesia Type: Gen ETT, Spinal, CSE, Regional  Intra-op Monitoring Plan: Standard ASA Monitors  Post Op Pain Control Plan: multimodal analgesia and IV/PO Opioids PRN  Induction:  IV  Airway Plan: Direct and Video, Post-Induction  Informed Consent: Informed consent signed with the Patient and all parties understand the risks and agree with anesthesia plan.  All questions answered.   ASA Score: 3    Ready For Surgery From Anesthesia Perspective.     .      "

## 2024-11-05 NOTE — SUBJECTIVE & OBJECTIVE
Principal Problem:<principal problem not specified>    Principal Orthopedic Problem: s/p R TKA    Interval History: Pt seen and examined at bedside. NAEON. Pain controlled. Ambulated 75 ft with RW, per PT.  VSS, AF. No other concerns stated. Urinating.      Review of patient's allergies indicates:   Allergen Reactions    Adhesive      Skin peeling    Erythromycin      Vomiting, weakness, nausea    Neosporin (neomycin-polymyx) Rash       Current Facility-Administered Medications   Medication    0.9%  NaCl infusion    acetaminophen tablet 1,000 mg    aluminum-magnesium hydroxide-simethicone 200-200-20 mg/5 mL suspension 30 mL    aspirin chewable tablet 81 mg    [START ON 11/6/2024] atorvastatin tablet 80 mg    [START ON 11/6/2024] buPROPion TB24 tablet 150 mg    ceFAZolin 2 g    dextrose 10% bolus 125 mL 125 mL    dextrose 10% bolus 250 mL 250 mL    [START ON 11/6/2024] famotidine tablet 40 mg    fentaNYL 50 mcg/mL injection 100 mcg    fentaNYL 50 mcg/mL injection 25 mcg    furosemide tablet 20 mg    glucagon (human recombinant) injection 1 mg    LIDOcaine (PF) 10 mg/ml (1%) injection 10 mg    losartan tablet 50 mg    methocarbamoL tablet 750 mg    [START ON 11/6/2024] metoprolol succinate (TOPROL-XL) 24 hr tablet 25 mg    midazolam injection 4 mg    naloxone 0.4 mg/mL injection 0.02 mg    ondansetron injection 4 mg    ondansetron injection 4 mg    oxyCODONE immediate release tablet 10 mg    oxyCODONE immediate release tablet 5 mg    oxyCODONE immediate release tablet 5 mg    prochlorperazine injection Soln 5 mg    ropivacaine 0.2% Perineural Pump infusion 500 ML    sodium chloride 0.9% flush 10 mL    sucralfate 100 mg/mL suspension 1 g    tranexamic acid (CYKLOKAPRON) 1,000 mg in 0.9% NaCl 100 mL IVPB (MB+)     Objective:     Vital Signs (Most Recent):  Temp: 97.5 °F (36.4 °C) (11/05/24 1340)  Pulse: (!) 50 (11/05/24 1445)  Resp: 17 (11/05/24 1455)  BP: (!) 146/83 (11/05/24 1445)  SpO2: 97 % (11/05/24 1445) Vital  "Signs (24h Range):  Temp:  [97.5 °F (36.4 °C)] 97.5 °F (36.4 °C)  Pulse:  [50-74] 50  Resp:  [14-24] 17  SpO2:  [97 %-100 %] 97 %  BP: (125-213)/() 146/83     Weight: 115.7 kg (255 lb)  Height: 5' 7" (170.2 cm)  Body mass index is 39.94 kg/m².      Intake/Output Summary (Last 24 hours) at 11/5/2024 1505  Last data filed at 11/5/2024 1325  Gross per 24 hour   Intake 2000 ml   Output --   Net 2000 ml        Ortho/SPM Exam  RLE  - Dressing c/d/I over the knee, minor strikethrough over the distal tibial pin sites  - Quad and hip flexor intact  - Compartments soft and compressible  - ROM full (eversion,inversion,plantar/dorsiflexion)  - TA/EHL/Gastroc/FHL assessed in isolation without deficit  - SILT throughout  - DP and PT palpated  2+  - Capillary Refill <3s        Significant Labs: All pertinent labs within the past 24 hours have been reviewed.    Significant Imaging: I have reviewed and interpreted all pertinent imaging results/findings.  "

## 2024-11-05 NOTE — CARE UPDATE
Ortho Post-Op Progress Note:    Patient seen and examined at bedside. Sandra Lui is a 74 y.o. female who is POD0 from right TKA. At this time, they are comfortable and reports minimal pain. Epidural has completely worn off by now. Vital signs are stable. Dressings are c/d/i with PNC in place.    RLE: 5/5 strength with ankle DF/PF and great toe flexion/extension. Sensation to light touch intact throughout lower extremity. Palpable DP pulse.    LLE: 5/5 strength with ankle DF/PF and great toe flexion/extension. Sensation to light touch intact throughout lower extremity. Palpable DP pulse.    Labs: reviewed  Imaging: reviewed    Plan:  -- Continue with current pain control regimen  -- PT/OT. WBAT RLE  -- DVT prophylaxis: ASA 81mg BID, FCD's to be worn at all times  - abx: ancef     -- Continue to monitor; will re-assess in AM    Danial Leone MD  Department of Orthopedic Surgery  [facility]

## 2024-11-06 ENCOUNTER — OFFICE VISIT (OUTPATIENT)
Dept: ORTHOPEDICS | Facility: CLINIC | Age: 74
End: 2024-11-06
Payer: MEDICARE

## 2024-11-06 VITALS
BODY MASS INDEX: 40.02 KG/M2 | HEIGHT: 67 IN | SYSTOLIC BLOOD PRESSURE: 120 MMHG | HEART RATE: 62 BPM | TEMPERATURE: 98 F | RESPIRATION RATE: 18 BRPM | DIASTOLIC BLOOD PRESSURE: 59 MMHG | WEIGHT: 255 LBS | OXYGEN SATURATION: 96 %

## 2024-11-06 DIAGNOSIS — Z96.651 S/P TOTAL KNEE REPLACEMENT, RIGHT: Primary | ICD-10-CM

## 2024-11-06 PROCEDURE — 25000003 PHARM REV CODE 250

## 2024-11-06 PROCEDURE — 97535 SELF CARE MNGMENT TRAINING: CPT

## 2024-11-06 PROCEDURE — 1160F RVW MEDS BY RX/DR IN RCRD: CPT | Mod: CPTII,95,, | Performed by: NURSE PRACTITIONER

## 2024-11-06 PROCEDURE — 25000003 PHARM REV CODE 250: Performed by: ANESTHESIOLOGY

## 2024-11-06 PROCEDURE — 1159F MED LIST DOCD IN RCRD: CPT | Mod: CPTII,95,, | Performed by: NURSE PRACTITIONER

## 2024-11-06 PROCEDURE — 97530 THERAPEUTIC ACTIVITIES: CPT

## 2024-11-06 PROCEDURE — 63600175 PHARM REV CODE 636 W HCPCS

## 2024-11-06 PROCEDURE — 99900035 HC TECH TIME PER 15 MIN (STAT)

## 2024-11-06 PROCEDURE — 97116 GAIT TRAINING THERAPY: CPT

## 2024-11-06 PROCEDURE — 99024 POSTOP FOLLOW-UP VISIT: CPT | Mod: 95,,, | Performed by: NURSE PRACTITIONER

## 2024-11-06 PROCEDURE — 97110 THERAPEUTIC EXERCISES: CPT

## 2024-11-06 PROCEDURE — 94761 N-INVAS EAR/PLS OXIMETRY MLT: CPT

## 2024-11-06 RX ADMIN — ACETAMINOPHEN 1000 MG: 500 TABLET ORAL at 05:11

## 2024-11-06 RX ADMIN — METOPROLOL SUCCINATE 25 MG: 25 TABLET, EXTENDED RELEASE ORAL at 09:11

## 2024-11-06 RX ADMIN — METHOCARBAMOL TABLETS 750 MG: 750 TABLET, COATED ORAL at 09:11

## 2024-11-06 RX ADMIN — ACETAMINOPHEN 1000 MG: 500 TABLET ORAL at 01:11

## 2024-11-06 RX ADMIN — ATORVASTATIN CALCIUM 80 MG: 40 TABLET, FILM COATED ORAL at 09:11

## 2024-11-06 RX ADMIN — ASPIRIN 81 MG: 81 TABLET, COATED ORAL at 09:11

## 2024-11-06 RX ADMIN — OXYCODONE HYDROCHLORIDE 10 MG: 10 TABLET ORAL at 01:11

## 2024-11-06 RX ADMIN — OXYCODONE HYDROCHLORIDE 10 MG: 10 TABLET ORAL at 07:11

## 2024-11-06 RX ADMIN — BUPROPION HYDROCHLORIDE 150 MG: 150 TABLET, EXTENDED RELEASE ORAL at 09:11

## 2024-11-06 RX ADMIN — SENNOSIDES AND DOCUSATE SODIUM 1 TABLET: 50; 8.6 TABLET ORAL at 09:11

## 2024-11-06 RX ADMIN — FAMOTIDINE 20 MG: 20 TABLET, FILM COATED ORAL at 09:11

## 2024-11-06 RX ADMIN — SUCRALFATE 1 G: 1 SUSPENSION ORAL at 01:11

## 2024-11-06 RX ADMIN — CEFAZOLIN 2 G: 2 INJECTION, POWDER, FOR SOLUTION INTRAMUSCULAR; INTRAVENOUS at 05:11

## 2024-11-06 RX ADMIN — LOSARTAN POTASSIUM 50 MG: 25 TABLET, FILM COATED ORAL at 09:11

## 2024-11-06 RX ADMIN — Medication 400 ML: at 01:11

## 2024-11-06 RX ADMIN — SUCRALFATE 1 G: 1 SUSPENSION ORAL at 05:11

## 2024-11-06 NOTE — PT/OT/SLP PROGRESS
Occupational Therapy   Treatment and Discharge Note    Name: Sandra Lui  MRN: [id number]  Admitting Diagnosis:  Primary osteoarthritis of right knee  1 Day Post-Op    Recommendations:     Discharge Recommendations: Low Intensity Therapy  Discharge Equipment Recommendations:  bedside commode, walker, rolling, shower chair  Barriers to discharge:  None    Assessment:     Sandra Lui is a 74 y.o. female with a medical diagnosis of Primary osteoarthritis of right knee.  She presents with R TKA. Performance deficits affecting function are impaired self care skills, impaired functional mobility, orthopedic precautions. Pt was able to perform Sit <> Stand Transfer with modified independence with rolling walker Bed <> Chair Transfer using Stand Pivot technique with modified independence with rolling walker  Toilet Transfer Stand Pivot technique with modified independence with bedside commode.  Able to perform UB/LB dressing c modified independence  Educated pt on bathing, car transfers, and cryotherapy. Patient demonstrates a mobility limitation that significantly impairs their ability to participate in one or more mobility related activities of daily living. Patient's mobility limitation cannot be sufficiently resolved with the use of a cane, but can be sufficiently resolved with the use of a rolling walker.The use of a rolling walker will considerably improve their ability to participate in MRADLs. Patient will use the walker on a regular basis at home. Patient has a mobility limitation that significantly impairs their ability to participate in one or more mobility related activities of daily living, including toileting. This deficit can be resolved by using a bedside commode. Patient demonstrates mobility limitations that will cause them to be confined to one room at home without bathroom access for up to 30 days. Using a bedside commode will greatly improve the patient's ability to participate in  "MRADLs.          Rehab Prognosis:  Good; patient would benefit from acute skilled OT services to address these deficits and reach maximum level of function.       Plan:     Patient to be seen daily to address the above listed problems via self-care/home management, therapeutic activities, therapeutic exercises  Plan of Care Expires: 11/06/24  Plan of Care Reviewed with: patient    Subjective     Chief Complaint: R TKA  Patient/Family Comments/goals: "I just got done working c PT."  Pain/Comfort:  Pain Rating 1: 0/10    Objective:     Communicated with: RN prior to session.  Patient found up in chair with cryotherapy, FCD, perineural catheter upon OT entry to room.    General Precautions: Standard, fall    Orthopedic Precautions:RLE weight bearing as tolerated  Braces: N/A  Respiratory Status: Room air     Occupational Performance:     Functional Mobility/Transfers:  Patient completed Sit <> Stand Transfer with modified independence  with  rolling walker   Patient completed Toilet Transfer Stand Pivot technique with modified independence with  rolling walker and bedside commode  Functional Mobility: Pt was able to walk to bathroom and then back to chair c CGA and RW.    Activities of Daily Living:  Upper Body Dressing: modified independence to don shirt.  Lower Body Dressing: modified independence to don underwear, shorts, and socks, and shoes      AMPAC 6 Click ADL: 23    Patient left up in chair with all lines intact, call button in reach, RN notified, and daughter present    GOALS:   Multidisciplinary Problems       Occupational Therapy Goals          Problem: Occupational Therapy    Goal Priority Disciplines Outcome Interventions   Occupational Therapy Goal     OT, PT/OT Progressing    Description: Goals to be met by: 11/6/24     Patient will increase functional independence with ADLs by performing:    UE Dressing with Stand-by Assistance.  LE Dressing with Stand-by Assistance.  Grooming while standing at sink " with Stand-by Assistance.  Toileting from toilet with Stand-by Assistance for hygiene and clothing management.   Toilet transfer to toilet with Stand-by Assistance.                         Time Tracking:     OT Date of Treatment: 11/06/24  OT Start Time: 0920  OT Stop Time: 1005  OT Total Time (min): 45 min    Billable Minutes:Self Care/Home Management 30  Therapeutic Activity 15               11/6/2024

## 2024-11-06 NOTE — PROGRESS NOTES
[facility])  Orthopedics  Progress Note    Patient Name: Sandra Lui  MRN: [id number]  Admission Date: 11/5/2024  Hospital Length of Stay: 0 days  Attending Provider: Sha Duran MD  Primary Care Provider: Tisha Aponte MD  Follow-up For: Procedure(s) (LRB):  ARTHROPLASTY, KNEE, TOTAL, OCTAVIO COMPUTER-ASSISTED NAVIGATION: RIGHT: SAME DAY (Right)    Post-Operative Day: 1 Day Post-Op  Subjective:     Principal Problem:<principal problem not specified>    Principal Orthopedic Problem: s/p R TKA    Interval History: Pt seen and examined at bedside. NAEON. Pain controlled. Ambulated 75 ft with RW, per PT.  VSS, AF. No other concerns stated. Urinating.      Review of patient's allergies indicates:   Allergen Reactions    Adhesive      Skin peeling    Erythromycin      Vomiting, weakness, nausea    Neosporin (neomycin-polymyx) Rash       Current Facility-Administered Medications   Medication    0.9%  NaCl infusion    acetaminophen tablet 1,000 mg    aluminum-magnesium hydroxide-simethicone 200-200-20 mg/5 mL suspension 30 mL    aspirin chewable tablet 81 mg    [START ON 11/6/2024] atorvastatin tablet 80 mg    [START ON 11/6/2024] buPROPion TB24 tablet 150 mg    ceFAZolin 2 g    dextrose 10% bolus 125 mL 125 mL    dextrose 10% bolus 250 mL 250 mL    [START ON 11/6/2024] famotidine tablet 40 mg    fentaNYL 50 mcg/mL injection 100 mcg    fentaNYL 50 mcg/mL injection 25 mcg    furosemide tablet 20 mg    glucagon (human recombinant) injection 1 mg    LIDOcaine (PF) 10 mg/ml (1%) injection 10 mg    losartan tablet 50 mg    methocarbamoL tablet 750 mg    [START ON 11/6/2024] metoprolol succinate (TOPROL-XL) 24 hr tablet 25 mg    midazolam injection 4 mg    naloxone 0.4 mg/mL injection 0.02 mg    ondansetron injection 4 mg    ondansetron injection 4 mg    oxyCODONE immediate release tablet 10 mg    oxyCODONE immediate release tablet 5 mg    oxyCODONE immediate release tablet 5 mg    prochlorperazine  "injection Soln 5 mg    ropivacaine 0.2% Perineural Pump infusion 500 ML    sodium chloride 0.9% flush 10 mL    sucralfate 100 mg/mL suspension 1 g    tranexamic acid (CYKLOKAPRON) 1,000 mg in 0.9% NaCl 100 mL IVPB (MB+)     Objective:     Vital Signs (Most Recent):  Temp: 97.5 °F (36.4 °C) (11/05/24 1340)  Pulse: (!) 50 (11/05/24 1445)  Resp: 17 (11/05/24 1455)  BP: (!) 146/83 (11/05/24 1445)  SpO2: 97 % (11/05/24 1445) Vital Signs (24h Range):  Temp:  [97.5 °F (36.4 °C)] 97.5 °F (36.4 °C)  Pulse:  [50-74] 50  Resp:  [14-24] 17  SpO2:  [97 %-100 %] 97 %  BP: (125-213)/() 146/83     Weight: 115.7 kg (255 lb)  Height: 5' 7" (170.2 cm)  Body mass index is 39.94 kg/m².      Intake/Output Summary (Last 24 hours) at 11/5/2024 1505  Last data filed at 11/5/2024 1325  Gross per 24 hour   Intake 2000 ml   Output --   Net 2000 ml        Ortho/SPM Exam  RLE  - Dressing c/d/I over the knee, minor strikethrough over the distal tibial pin sites  - Quad and hip flexor intact  - Compartments soft and compressible  - ROM full (eversion,inversion,plantar/dorsiflexion)  - TA/EHL/Gastroc/FHL assessed in isolation without deficit  - SILT throughout  - DP and PT palpated  2+  - Capillary Refill <3s        Significant Labs: All pertinent labs within the past 24 hours have been reviewed.    Significant Imaging: I have reviewed and interpreted all pertinent imaging results/findings.  Assessment/Plan:     * Primary osteoarthritis of right knee  74 y.o. female POD1 s/p R TKA    Pain control: MM  PT/OT: WBAT RLE, nothing behind the knee  DVT PPx: ASA 81 BID, FCDs at all times when not ambulating  Abx: postop Ancef  Labs: none  Drain: none  Morales: none    Dispo: Outpatient PT; encourage PT/OT             VICTORINA Leone MD  Orthopedics  [facility])    " 42yo man with PMH CHF with EF 22%, HTN, DM, not on any medications since May 2/2 incarceration, not following any doctors, presenting with worsening chest pain and SOB for the last 1.5 weeks. Patient unable to lie flat 2/2 symptom exacerbation, states this feels similar to prior CHF exacerbation. Denies fevers, N/V. Endorsing vague abd pain, last BM 3 days ago.

## 2024-11-06 NOTE — PROGRESS NOTES
Acute Pain Service and Perioperative Surgical Home Progress Note    HPI  Sandra Lui is a 74 y.o., female,     Interval history      Surgery:  Procedure(s) (LRB):  ARTHROPLASTY, KNEE, TOTAL, OCTAVIO COMPUTER-ASSISTED NAVIGATION: RIGHT: SAME DAY (Right)    Post Op Day #: 1    Rested well overnight.  Ambulating to restroom and bedside commode with assistance with good urine output.  No nausea or vomiting/ eating and drinking well.  Pain well controlled on multimodal regimen.    Catheter type: Perineural Adductor Canal    Infusion type: Ropivacaine 0.2%, with a 7cc automatic bolus every 3 hours, combined with a 5 cc patient controlled bolus available [id number]    Problem List:    Active Hospital Problems    Diagnosis  POA    *Primary osteoarthritis of right knee [M17.11]  Yes      Resolved Hospital Problems   No resolved problems to display.       Subjective:       General appearance of alert, oriented, no complaints   Pain with rest: 4    Numbers   Pain with movement: 4    Numbers   Side Effects    1. Pruritis No    2. Nausea No    3. Motor Blockade Yes, 0=Ability to raise lower extremities off bed    4. Sedation Yes, 1=awake and alert    Schedule Medications:    acetaminophen  1,000 mg Oral Q6H    aluminum-magnesium hydroxide-simethicone  30 mL Oral QID (AC & HS)    aspirin  81 mg Oral BID    atorvastatin  80 mg Oral Daily    buPROPion  150 mg Oral Daily    ceFAZolin (Ancef) IV (PEDS and ADULTS)  2 g Intravenous Q8H    electrolytes-dextrose  400 mL Oral Q4H    famotidine  20 mg Oral BID    famotidine  40 mg Oral Daily    furosemide  20 mg Oral BID    losartan  50 mg Oral Daily    methocarbamoL  750 mg Oral TID    metoprolol succinate  25 mg Oral Daily    polyethylene glycol  17 g Oral Daily    pregabalin  75 mg Oral QHS    senna-docusate 8.6-50 mg  1 tablet Oral BID    sucralfate  1 g Oral Q6H        Continuous Infusions:   ropivacaine 0.2% Perineural Pump infusion 500 ML   Perineural Continuous   New Bag at 11/05/24  "1341        PRN Medications:    Current Facility-Administered Medications:     bisacodyL, 10 mg, Rectal, Q12H PRN    dextrose 10%, 12.5 g, Intravenous, PRN    dextrose 10%, 25 g, Intravenous, PRN    naloxone, 0.02 mg, Intravenous, PRN    ondansetron, 4 mg, Intravenous, Q8H PRN    oxyCODONE, 5 mg, Oral, Q3H PRN    oxyCODONE, 10 mg, Oral, Q3H PRN    prochlorperazine, 5 mg, Intravenous, Q6H PRN       Antibiotics:  Antibiotics (From admission, onward)      Start     Stop Route Frequency Ordered    11/05/24 1945  ceFAZolin 2 g  (MEDICATIONS - ANTIBIOTICS NO PENICILLIN ALLERGY TOTAL KNEE PATHWAY POST-OP)         11/06/24 1144 IV Every 8 hours (non-standard times) 11/05/24 1324               Objective:     Catheter site clean, dry, intact          Vital Signs (Most Recent):  Temp: 97.7 °F (36.5 °C) (11/06/24 0501)  Pulse: 64 (11/06/24 0501)  Resp: 16 (11/06/24 0501)  BP: 136/62 (11/06/24 0501)  SpO2: 97 % (11/06/24 0501) Vital Signs Range (Last 24H):  Temp:  [96.4 °F (35.8 °C)-98.7 °F (37.1 °C)]   Pulse:  [50-74]   Resp:  [14-24]   BP: (118-213)/()   SpO2:  [95 %-100 %]          I & O (Last 24H):  Intake/Output Summary (Last 24 hours) at 11/6/2024 0527  Last data filed at 11/6/2024 0432  Gross per 24 hour   Intake 3120 ml   Output 550 ml   Net 2570 ml       Physical Exam:    GA: Alert, comfortable, no acute distress.   Pulmonary: Clear to auscultation. Normal RR.    Cardiac: regular rate and rhythm.      Laboratory: reviewed in chart  CBC: No results for input(s): "WBC", "RBC", "HGB", "HCT", "PLT", "MCV", "MCH", "MCHC" in the last 72 hours.    BMP: No results for input(s): "NA", "K", "CO2", "CL", "BUN", "CREATININE", "GLU", "MG", "PHOS", "CALCIUM" in the last 72 hours.    No results for input(s): "PT", "INR", "PROTIME", "APTT" in the last 72 hours.          Assessment:         Pain control adequate    Plan:     1) Pain: Adductor canal perineural catheter in place and infusing. Dressing in tact.  Multimodal pain " regimen ordered which includes acetaminophen, celecoxib, pregabalin, and prn oxycodone.  Will continue to monitor. Plan to discharge with Perineural Pain Pump.   2) HLD and HTN: continue home regimen   3) FEN/GI: Tolerating regular diet.     4) Dispo: Pt working well with PT/OT. Case management and SW following along for setting up home health and physical therapy. Plan to discharge home this am.          Evaluator Kym Smith PA-C

## 2024-11-06 NOTE — PLAN OF CARE
Problem: Adult Inpatient Plan of Care  Goal: Plan of Care Review  Outcome: Adequate for Care Transition  Flowsheets (Taken 11/6/2024 1053)  Plan of Care Reviewed With:   patient   child     Problem: Adult Inpatient Plan of Care  Goal: Patient-Specific Goal (Individualized)  Outcome: Adequate for Care Transition  Flowsheets (Taken 11/6/2024 1053)  Individualized Care Needs: Pain management     Problem: Pain Acute  Goal: Optimal Pain Control and Function  Intervention: Develop Pain Management Plan  Flowsheets (Taken 11/6/2024 1053)  Pain Management Interventions:   breathing exercises utilized   care clustered   cold applied   pain management plan reviewed with patient/caregiver   pain pump in use   position adjusted   quiet environment facilitated   relaxation techniques promoted   warm blanket provided     Problem: Pain Acute  Goal: Optimal Pain Control and Function  Intervention: Prevent or Manage Pain  Flowsheets (Taken 11/6/2024 1053)  Medication Review/Management:   medications reviewed   high-risk medications identified     Problem: Knee Arthroplasty  Goal: Fluid and Electrolyte Balance  Intervention: Monitor and Manage Fluid and Electrolyte Balance  Flowsheets (Taken 11/6/2024 1053)  Fluid/Electrolyte Management: oral rehydration therapy initiated     Problem: Knee Arthroplasty  Goal: Nausea and Vomiting Relief  Intervention: Prevent or Manage Nausea and Vomiting  Flowsheets (Taken 11/6/2024 1053)  Nausea/Vomiting Interventions:   nausea triggers minimized   stimuli minimized     Problem: Comorbidity Management  Goal: Blood Pressure in Desired Range  Intervention: Maintain Blood Pressure Management  Flowsheets (Taken 11/6/2024 1053)  Medication Review/Management:   medications reviewed   high-risk medications identified     Problem: Skin Injury Risk Increased  Goal: Skin Health and Integrity  Intervention: Optimize Skin Protection  Flowsheets (Taken 11/6/2024 1053)  Activity Management: Up in chair - L3    Plan of care reviewed with patient, verbalized understanding. Medications reviewed and given as ordered. Rounding for safety and patient care per policy. Safety precautions maintained. Call light within reach, bed wheels locked, bed in lowest position, side rails up x2, patient instructed to call for assistance, DME at bedside.

## 2024-11-06 NOTE — DISCHARGE SUMMARY
[facility])  Orthopedics  Discharge Summary      Patient Name: Sandra Lui  MRN: [id number]  Admission Date: 11/5/2024  Hospital Length of Stay: 0 days  Discharge Date and Time:  11/06/2024 7:02 AM  Attending Physician: Sha Duran MD   Discharging Provider: VICTORINA Leone MD  Primary Care Provider: Tisha Aponte MD    HPI:   CC:  Right knee pain     Sandra Lui is a 74 y.o. female with history of Right knee pain. Pain is worse with activity and weight bearing.  Patient has experienced interference of activities of daily living due to decreased range of motion and an increase in joint pain and swelling.  Patient has failed non-operative treatment including NSAIDs, corticosteroid injections, viscosupplement injections, and activity modification.  Sandra Lui currently ambulates using assistive device .      Relevant medical conditions of significance in perioperative period:  History of TIA- on medication managed by PCP  HTN- on medication managed by PCP  HLD- on medication managed by PCP  CKD 3a- monitored by PCP         Given documented medical comorbidities including those listed above and based off of CMS criteria patient would meet inpatient admission status guidelines. This case has been approved as inpatient.        Procedure(s) (LRB):  ARTHROPLASTY, KNEE, TOTAL, OCTAVIO COMPUTER-ASSISTED NAVIGATION: RIGHT: SAME DAY (Right)      Hospital Course:  On 11/5/24, the patient arrived to the [facility] for proper pre-operative management.  Upon completion of pre-operative preparation, the patient was taken back to the operative theatre. R TKA was performed without complication and the patient was transported to the post anesthesia care unit in stable condition.  After appropriate recovery from the anaesthetic agents used during the surgery, the patient was then transported to the hospital inpatient floor.  The interim of the hospital stay from arrival on the  floor up to discharge has been uncomplicated. The patient has tolerated regular diet.  The patient's pain has been controlled using a multimodal approach. Currently, the patient's pain is well controlled on an oral regimen.  The patient has been voiding without difficulty.  The patient began participation in physical therapy after surgery and has progressed throughout the entire hospital stay.  Currently, the patient's progress is sufficient to allow the them to be discharged to home safely.  The patient agrees with this assessment and desires a discharge today.      Goals of Care Treatment Preferences:             Significant Diagnostic Studies: No pertinent studies.    Pending Diagnostic Studies:       None          Final Active Diagnoses:    Diagnosis Date Noted POA    PRINCIPAL PROBLEM:  Primary osteoarthritis of right knee [M17.11] 07/29/2024 Yes      Problems Resolved During this Admission:      Discharged Condition: good    Disposition: Home or Self Care    Follow Up:    Patient Instructions:      Protime-INR   Standing Status: Future Number of Occurrences: 1 Standing Exp. Date: 11/30/25     Activity as tolerated     Sponge bath only until clinic visit     Keep surgical extremity elevated when not ambulating     Lifting restrictions   Order Comments: No strenuous exercise or lifting of > 10 lbs     Weight bearing as tolerated     No driving, operating heavy equipment or signing legal documents while taking pain medication     Leave dressing on - Keep it clean, dry, and intact until clinic visit   Order Comments: Do not remove surgical dressing for 2 weeks post-op. This will be done only by MD at initial post-op visit. If dressing is completely saturated, replace with identical dressing - silver-impregnated hydrocolloid dressing.     Do not get dressings wet. Do not shower.     If dressing continues to be saturated or there are signs of infection, please call [facility] [phone] for further instructions  and to make appt to be seen.     On POD1 remove ace wrap and cotton padding. Leave Aquacel bandage on until 2  week post op visit in clinic     Call MD for:  temperature >100.4     Call MD for:  persistent nausea and vomiting     Call MD for:  severe uncontrolled pain     Call MD for:  difficulty breathing, headache or visual disturbances     Call MD for:  redness, tenderness, or signs of infection (pain, swelling, redness, odor or green/yellow discharge around incision site)     Call MD for:  hives     Call MD for:  persistent dizziness or light-headedness     Call MD for:  extreme fatigue     Medications:  Reconciled Home Medications:      Medication List        ASK your doctor about these medications      * acetaminophen 500 MG tablet  Commonly known as: TYLENOL  Take 500 mg by mouth once daily.     * acetaminophen 650 MG Tbsr  Commonly known as: TYLENOL  Take 1 tablet (650 mg total) by mouth every 8 (eight) hours.     ALBUTEROL (REFILL) INHL  Inhale into the lungs. 2 puffs as needed     albuterol 90 mcg/actuation inhaler  Commonly known as: PROVENTIL/VENTOLIN HFA  Inhale 1-2 puffs into the lungs every 6 (six) hours as needed for Wheezing or Shortness of Breath. Rescue     allopurinoL 100 MG tablet  Commonly known as: ZYLOPRIM  Take 100 mg by mouth once daily.     * aspirin 81 MG EC tablet  Commonly known as: ECOTRIN  Take 1 tablet (81 mg total) by mouth 2 (two) times a day. Hold plavix for 1 week post op     * aspirin 81 MG EC tablet  Commonly known as: ECOTRIN  Take 81 mg by mouth once daily.     buPROPion 150 MG TB24 tablet  Commonly known as: WELLBUTRIN XL  Take 150 mg by mouth once daily.     calcium carbonate 600 mg calcium (1,500 mg) Tab  Commonly known as: OS-NOBLE  Take 1,200 mg by mouth once daily.     clobetasoL 0.05 % external solution  Commonly known as: TEMOVATE  Apply 1 mL topically once daily.     clopidogreL 75 mg tablet  Commonly known as: PLAVIX  Take 1 tablet (75 mg total) by mouth once daily.      colchicine 0.6 mg tablet  Commonly known as: COLCRYS  Take 0.6 mg by mouth daily as needed.     famotidine 40 MG tablet  Commonly known as: PEPCID  Take 40 mg by mouth once daily.     fish oil-omega-3 fatty acids 300-1,000 mg capsule  Take by mouth once daily.     fluorometholone 0.1% 0.1 % Drps  Commonly known as: FML  Place 1 drop into both eyes Daily.     fluticasone propionate 50 mcg/actuation nasal spray  Commonly known as: FLONASE  1 spray (50 mcg total) by Each Nostril route 2 (two) times daily as needed for Rhinitis.     furosemide 20 MG tablet  Commonly known as: LASIX  Take 1 tablet (20 mg total) by mouth 2 (two) times daily.     gabapentin 300 MG capsule  Commonly known as: NEURONTIN  Take 1 capsule (300 mg total) by mouth 2 (two) times daily.     ipratropium 42 mcg (0.06 %) nasal spray  Commonly known as: ATROVENT  2 sprays by Each Nostril route as needed.     latanoprost 0.005 % ophthalmic solution  Place 1 drop into both eyes every evening.     losartan 50 MG tablet  Commonly known as: COZAAR  Take 1 tablet (50 mg total) by mouth once daily.     methocarbamoL 750 MG Tab  Commonly known as: ROBAXIN  Take 1 tablet (750 mg total) by mouth 4 (four) times daily as needed (muscle spasms).     metoprolol succinate 25 MG 24 hr tablet  Commonly known as: TOPROL-XL  Take 25 mg by mouth once daily.     oxyCODONE 5 MG immediate release tablet  Commonly known as: ROXICODONE  Take 1-2 tablets every 4-6 hours as needed for pain     pantoprazole 40 MG tablet  Commonly known as: PROTONIX  Take 1 tablet (40 mg total) by mouth once daily.     rosuvastatin 20 MG tablet  Commonly known as: CRESTOR  Take 1 tablet (20 mg total) by mouth every evening.     senna-docusate 8.6-50 mg 8.6-50 mg per tablet  Commonly known as: SENNA WITH DOCUSATE SODIUM  Take 1 tablet by mouth once daily.     traMADoL 50 mg tablet  Commonly known as: ULTRAM  Take 50 mg by mouth every 12 (twelve) hours.     vitamin D 1000 units Tab  Commonly known  as: VITAMIN D3  Take 1,000 Units by mouth once daily.     VITAMIN D ORAL  Take by mouth once daily.           * This list has 4 medication(s) that are the same as other medications prescribed for you. Read the directions carefully, and ask your doctor or other care provider to review them with you.                  VICTORINA Leone MD  Orthopedics  [facility])

## 2024-11-06 NOTE — NURSING
Has met unit/department guidelines for discharge from each phase of the post procedure continuum. Patient discharged.  Instructions, placed in dc folder and Prescriptions given.  IV removed, tolerated well, w/ catheter intact, no redness or swelling to area. Dressing to right knee remains CDI. Patient verbalized understanding instructions.  AAOx3, VSS, NADN, no complaints of pain noted at this time.  Wheelchair to private vehicle in care of daughter.  All personal belongings sent with pt.  Blue Bracelet given applied to pts wrist and instructions given to call # on bracelet w/any surgery related issues.      Infu Block Pump teaching done w/patient & patients daughter. Instructed to remove on day 5, Sunday (11/10/24) at 12 noon or when pump alarms infusion complete. Demonstrated and explained how to remove catheter.Pt and pts daughter verbalized understanding.  All questions answered. Nimbus Pump home care instxn pamphlet provided, home care supplies provided to patient and placed in discharge folder. Encouraged to contact anesthesia using # on brochure/ on sticker on catheter site, with any questions/concerns re: pain, side effects. Instructed to call Infu Block  # for any pump related issues. Informed that pt/fmly will receive follow up calls from [facility].

## 2024-11-06 NOTE — PLAN OF CARE
Problem: Adult Inpatient Plan of Care  Goal: Plan of Care Review  Outcome: Met  Flowsheets (Taken 11/6/2024 0623)  Plan of Care Reviewed With: patient  Goal: Patient-Specific Goal (Individualized)  Outcome: Met  Flowsheets (Taken 11/6/2024 0623)  Individualized Care Needs: Pain management  Anxieties, Fears or Concerns: None  Patient/Family-Specific Goals (Include Timeframe): Keep patient and family updated on  Plan of Care     Problem: Pain Acute  Goal: Optimal Pain Control and Function  Outcome: Progressing  Intervention: Develop Pain Management Plan  Flowsheets (Taken 11/6/2024 0623)  Pain Management Interventions:   care clustered   cold applied   diversional activity provided   medication offered   pain management plan reviewed with patient/caregiver   pain pump in use   quiet environment facilitated   relaxation techniques promoted  Intervention: Prevent or Manage Pain  Flowsheets (Taken 11/6/2024 0623)  Sleep/Rest Enhancement:   awakenings minimized   noise level reduced   regular sleep/rest pattern promoted   relaxation techniques promoted   room darkened  Sensory Stimulation Regulation:   auditory stimulation minimized   care clustered   quiet environment promoted   visual stimulation minimized   tactile stimulation minimized  Bowel Elimination Promotion: adequate fluid intake promoted  Medication Review/Management:   medications reviewed   high-risk medications identified  Intervention: Optimize Psychosocial Wellbeing  Flowsheets (Taken 11/6/2024 0623)  Supportive Measures:   active listening utilized   decision-making supported   positive reinforcement provided   relaxation techniques promoted   verbalization of feelings encouraged     Problem: Fall Injury Risk  Goal: Absence of Fall and Fall-Related Injury  Outcome: Met  Intervention: Identify and Manage Contributors  Flowsheets (Taken 11/6/2024 0623)  Self-Care Promotion:   BADL personal objects within reach   BADL personal routines maintained    adaptive equipment use encouraged  Medication Review/Management:   medications reviewed   high-risk medications identified  Intervention: Promote Injury-Free Environment  Flowsheets (Taken 11/6/2024 0623)  Safety Promotion/Fall Prevention:   assistive device/personal item within reach   Fall Risk reviewed with patient/family   high risk medications identified   medications reviewed   nonskid shoes/socks when out of bed   patient expresses understanding of fall risk and prevention   side rails raised x 2     Problem: Knee Arthroplasty  Goal: Absence of Bleeding  Outcome: Met  Intervention: Monitor and Manage Bleeding  Flowsheets (Taken 11/6/2024 0623)  Bleeding Management: dressing monitored     Problem: Comorbidity Management  Goal: Blood Pressure in Desired Range  Outcome: Progressing  Intervention: Maintain Blood Pressure Management  Flowsheets (Taken 11/6/2024 0623)  Medication Review/Management:   medications reviewed   high-risk medications identified

## 2024-11-06 NOTE — ANESTHESIA POSTPROCEDURE EVALUATION
Anesthesia Post Evaluation    Patient: Sandra Lui    Procedure(s) Performed: Procedure(s) (LRB):  ARTHROPLASTY, KNEE, TOTAL, OCTAVIO COMPUTER-ASSISTED NAVIGATION: RIGHT: SAME DAY (Right)    Final Anesthesia Type: spinal      Patient location during evaluation: PACU  Patient participation: Yes- Able to Participate  Level of consciousness: awake and alert  Post-procedure vital signs: reviewed and stable  Pain management: adequate  Airway patency: patent  RUSH mitigation strategies: Multimodal analgesia  PONV status at discharge: No PONV  Anesthetic complications: no      Cardiovascular status: blood pressure returned to baseline and hemodynamically stable  Respiratory status: unassisted  Hydration status: euvolemic  Follow-up not needed.              Vitals Value Taken Time   /62 11/06/24 0751   Temp 36.6 °C (97.8 °F) 11/06/24 0751   Pulse 65 11/06/24 0751   Resp 18 11/06/24 0751   SpO2 97 % 11/06/24 0751         Event Time   Out of Recovery 16:02:31         Pain/Chuck Score: Pain Rating Prior to Med Admin: 10 (11/6/2024  7:01 AM)  Pain Rating Post Med Admin: 8 (11/6/2024  6:40 AM)  Chuck Score: 10 (11/5/2024  2:35 PM)

## 2024-11-06 NOTE — PROGRESS NOTES
Established Patient - Audio Only Telehealth Visit     The patient location is: her home in [place], [place]  The chief complaint leading to consultation is: s/p right knee replacement  Visit type: Virtual visit with audio only (telephone)  Total time spent with patient: 10 minutes       I called the patient today regarding her  surgery with Dr. Duran. The patient had a right TKA on 11/5.     Pain Scale: 3 / 10    Any issues with Fever: No.    Any issues with medications (specifically DVT prophylaxis): No.    Pain medication: yes;  Celebrex : yes  Protonix : yes  Resume home meds : Yes    Any issues with:   Bowel movements: No.  Passing slick: Yes:   Urination: Yes:     Completing at home exercises: Yes    Any concerns regarding their dressing/bandage:  No.    Patient confirmed first OP-PT appointment:  at [facility] on  11/7/2024 at 1000.     Any other concerns: No.    Blue Bracelet : yes    The patient was informed that if they have any urgent issues with their bandage, medications or any other health concerns regarding their surgery to call the 24/7 Orthopedic Post-op Hot Line at [phone]. The patient was reminded that if they have any chest pain or shortness of breath to call 911 or go to the ER.    The patient verbalized understanding and does not have any other questions                           This service was not originating from a related E/M service provided within the previous 7 days nor will  to an E/M service or procedure within the next 24 hours or my soonest available appointment.  Prevailing standard of care was able to be met in this audio-only visit.

## 2024-11-06 NOTE — PLAN OF CARE
Problem: Physical Therapy  Goal: Physical Therapy Goal  Description: Goals to be met by: 24     Patient will increase functional independence with mobility by performin. Supine to sit with Supervision- Not Met  2. Sit to stand transfer with Stand-by Assistance- Not Met  3. Gait  x 150 feet with Stand-by Assistance using Rolling Walker. - Met  4. Ascend/descend 4 stair with bilateral Handrails Contact Guard Assistance using No Assistive Device. - Met  5. Lower extremity TKA home exercise program x 10 reps per handout, with supervision- Met    Patient demonstrates a mobility limitation that significantly impairs their ability to participate in one or more mobility related activities of daily living. Patient's mobility limitation cannot be sufficiently resolved with the use of a cane, but can be sufficiently resolved with the use of a rolling walker.The use of a rolling walker will considerably improve their ability to participate in MRADLs. Patient will use the walker on a regular basis at home.     Patient has a mobility limitation that significantly impairs their ability to participate in one or more mobility related activities of daily living, including toileting. This deficit can be resolved by using a bedside commode. Patient demonstrates mobility limitations that will cause them to be confined to one room at home without bathroom access for up to 30 days. Using a bedside commode will greatly improve the patient's ability to participate in MRADLs.       Outcome: Adequate for Care Transition

## 2024-11-06 NOTE — PT/OT/SLP PROGRESS
Physical Therapy Treatment and Discharge Summary    Patient Name:  Sandra Lui   MRN:  [id number]    Recommendations:     Discharge Recommendations: Low Intensity Therapy  Discharge Equipment Recommendations: bedside commode, walker, rolling, shower chair  Barriers to discharge: None    Assessment:     Sandra Lui is a 74 y.o. female admitted with a medical diagnosis of Primary osteoarthritis of right knee.  She presents with the following impairments/functional limitations: weakness, impaired endurance, impaired self care skills, impaired functional mobility, gait instability, impaired balance, decreased lower extremity function, pain, decreased ROM, edema  Pt progressed really well with PT today and was able to amb 175' with RW and SBA .    She ascended/descended 4 steps with B rails and CGA. She had no LOB and no dizziness.  She does require increase time to perform sit to stand due to weakness but is able to complete with CGA from most surfaces. She does require min assist for sit to stand from a lower surface and will benefit from us of bedside commode for elevated height and armrests for increase safety with toileting.    Pt is ready for d/c home today from PT standpoint with her dtr  to assist PRN and  will benefit from OPPT for cont PT to maximize  functional recovery, strength and ROM.    .    Rehab Prognosis: Good; patient would benefit from cont skilled PT services post d/c to address these deficits and reach maximum level of function.    Recent Surgery: Procedure(s) (LRB):  ARTHROPLASTY, KNEE, TOTAL, OCTAVIO COMPUTER-ASSISTED NAVIGATION: RIGHT: SAME DAY (Right) 1 Day Post-Op    Plan:     During this hospitalization, patient to be d/c home today with her dtr to assist PRN and OPPT to start 11/7/24 to address the identified rehab impairments via gait training, therapeutic activities, therapeutic exercises and progress toward the following goals:    Plan of Care Expires:  12/05/24    Subjective     Chief  "Complaint: " I am okay" Reports her pain " is lower now"  Patient/Family Comments/goals: to go home and be able to go out and wear good shoes  Pain/Comfort:  Pain Rating 1: 8/10  Location - Side 1: Right  Location 1: knee  Pain Addressed 1: Pre-medicate for activity, Reposition, Distraction  Pain Rating Post-Intervention 1: 8/10 (states is tolerable and she is"okay")      Objective:     Communicated with RN prior to session.  Patient found HOB elevated with cryotherapy, FCD, perineural catheter, peripheral IV upon PT entry to room.  Pt's dtr was present in room when pt and PT returned to room after PT session in the therapy gym.    General Precautions: Standard, fall  Orthopedic Precautions: RLE weight bearing as tolerated  Braces: N/A  Respiratory Status: Room air     Pt with noted small area of sanguinous drg on bandage proximal to the patella, drg area did not change in size during session. RN notified and OT notified for monitoring during their session for changes.     Functional Mobility:  Bed Mobility:    Supine to Sit: Stand by assistance in the bed  Supine to Sit: minimum assistance R LE on ex mat  Sit to Supine: minimum assistance R LE on ex mat    Transfers: - pt requires increase time to complete all transfers due to moves slowly due to pain and weakness  Sit to Stand:  minimal assistance from bed at standard (lower) height with rolling walker  Sit to Stand:  stand by assistance from bedside commode with rolling walker  Sit to Stand:  contact guard assistance  from ex mat and bedside chair with rolling walker  Toilet Transfer: stand by assistance with  rolling walker  using  bedside commode over commode in bathroom  Pt performed personal hygiene at commode and at the sink with RW and SBA     Gait:   Pt amb  15 feet x 2, and then 175 feet x 1 with RW and SBA on level surfaces with cues for heel strike, posture and sequencing to increase safety and indep with gait.   Pt amb with decrease eva, step " length, + antalgic gait pattern R LE and step-to gait pattern  She required increase time for task due to slow eva  No LOB and no dizziness during gait.     Balance: Stand static with RW: Supervision   Stand dynamic with RW: SBA    Stairs:  Pt ascended/descended 4 stair(s) with No Assistive Device with bilateral handrails with Contact Guard Assistance.       AM-PAC 6 CLICK MOBILITY  Turning over in bed (including adjusting bedclothes, sheets and blankets)?: 3  Sitting down on and standing up from a chair with arms (e.g., wheelchair, bedside commode, etc.): 3  Moving from lying on back to sitting on the side of the bed?: 3  Moving to and from a bed to a chair (including a wheelchair)?: 3  Need to walk in hospital room?: 3  Climbing 3-5 steps with a railing?: 3  Basic Mobility Total Score: 18       Treatment & Education:  Patient educated on role of acute care PT and PT POC, safety while in hospital including calling nurse for mobility, and call light usage.  Educated about weightbearing as bonnie and provided cuing for adherence as appropriate during session.  Educated about importance of OOB mobility and remaining up in chair most of the day.  Pt instructed on and performed therapeutic ex's for TKA HEP (QS, AP, GS, HS, Hip abd, SLR, SAQ, LAQ) x 10 reps x 3 sets each for muscle strengthening, endurance and increase knee ROM. Pt demonstrated good understanding back to PT. Patient required skilled PT for instruction of exercises and appropriate cues to perform exercises safely and appropriately.  Issued written handout of TKA HEP and reviewed with pt.  Pt ed on importance of elevating  LE above level of her heart 3-4 times a day and proper placement of pillows to keep knee extended and not flexed.  Pt and her caregiver verbalized good understanding back to PT.    Pt ed on mobility expectations at home after d/c and she verbalized good understanding back to PT  Pt ed on use of cryotherapy for edema and pain control,  and on safety awareness with use of RW in the home and pt verbalized good understanding back to PT.   PT reviewed and demonstrated car transfers with pt and caregiver and answered all questions.  Pt and caregiver verbalized good understanding back to PT.   .Pt's dtr was present in room when PT and pt returned to room and PT reviewed all ed as noted above and POC with pt's dtr and she verbalized good understanding back to PT.    PT answered all questions within PT scope of practice.     Patient left up in chair with all lines intact, call button in reach, RN notified, and dtr present..    GOALS:   Multidisciplinary Problems       Physical Therapy Goals          Problem: Physical Therapy    Goal Priority Disciplines Outcome Interventions   Physical Therapy Goal     PT, PT/OT Adequate for Care Transition    Description: Goals to be met by: 24     Patient will increase functional independence with mobility by performin. Supine to sit with Supervision- Not Met  2. Sit to stand transfer with Stand-by Assistance- Not Met  3. Gait  x 150 feet with Stand-by Assistance using Rolling Walker. - Met  4. Ascend/descend 4 stair with bilateral Handrails Contact Guard Assistance using No Assistive Device. - Met  5. Lower extremity TKA home exercise program x 10 reps per handout, with supervision- Met    Patient demonstrates a mobility limitation that significantly impairs their ability to participate in one or more mobility related activities of daily living. Patient's mobility limitation cannot be sufficiently resolved with the use of a cane, but can be sufficiently resolved with the use of a rolling walker.The use of a rolling walker will considerably improve their ability to participate in MRADLs. Patient will use the walker on a regular basis at home.     Patient has a mobility limitation that significantly impairs their ability to participate in one or more mobility related activities of daily living, including  toileting. This deficit can be resolved by using a bedside commode. Patient demonstrates mobility limitations that will cause them to be confined to one room at home without bathroom access for up to 30 days. Using a bedside commode will greatly improve the patient's ability to participate in MRADLs.                            Time Tracking:     PT Received On: 11/06/24  PT Start Time: 0753     PT Stop Time: 0909  PT Total Time (min): 76 min     Billable Minutes: Gait Training 26, Therapeutic Activity 26, and Therapeutic Exercise 20    Treatment Type: Treatment  PT/PTA: PT     Number of PTA visits since last PT visit: 0     11/06/2024

## 2024-11-07 ENCOUNTER — CLINICAL SUPPORT (OUTPATIENT)
Dept: REHABILITATION | Facility: HOSPITAL | Age: 74
End: 2024-11-07
Attending: ORTHOPAEDIC SURGERY
Payer: MEDICARE

## 2024-11-07 DIAGNOSIS — M25.661 DECREASED RANGE OF MOTION (ROM) OF RIGHT KNEE: Primary | ICD-10-CM

## 2024-11-07 PROCEDURE — 97535 SELF CARE MNGMENT TRAINING: CPT | Mod: KX

## 2024-11-07 PROCEDURE — 97112 NEUROMUSCULAR REEDUCATION: CPT | Mod: KX

## 2024-11-07 PROCEDURE — 97530 THERAPEUTIC ACTIVITIES: CPT | Mod: KX

## 2024-11-07 PROCEDURE — 97161 PT EVAL LOW COMPLEX 20 MIN: CPT | Mod: KX

## 2024-11-07 NOTE — PLAN OF CARE
"[facility]   Physical Therapy Initial Evaluation      Name: Sandra Lui  Clinic Number: [id number]    Therapy Diagnosis:   Encounter Diagnosis   Name Primary?    Decreased range of motion (ROM) of right knee Yes        Physician: Sha Duran MD    Physician Orders: PT Eval and Treat   Medical Diagnosis from Referral: M17.11 (ICD-10-CM) - Unilateral primary osteoarthritis, right knee  Evaluation Date: 11/7/2024  Authorization Period Expiration: 12/31/2024  Plan of Care Expiration: 12/20/2024  Progress Note Due: 10th visit  Visit # / Visits authorized: 1/ 1   FOTO: 1/3    Precautions: Standard diabetes    Time In: 10:00 am  Time Out: 11:15 am  Total Billable Time: 65 minutes    Objective      Posture: rounded shoulders and forward head  Palpation: boggy end feel to patella mobilizations; warmth, redness and swelling as expected post op. negative s/s of DVT per Well's Criteria at this time    Active range of motion:  Right knee: 5-65  Left knee: 2-112     Passive range of motion:  Right knee: held-70  Left knee: 2-115       Lower extremity manual muscle test Right Left Pain/dysfunction with movement   Hip flexion 3-/5 3+/5    Hip extension 3+/5 3+/5    Hip abduction 3+/5 3+/5    Knee flexion 3/5 4-/5 Right knee pain   Knee extension 3/5 4/5 Right knee pain     Timed Up and Go:  patient unable to get out of chair due to pain; will reassess at future visit.     30" Chair Stand: held; will reassess at future visit    Gait Analysis: Modified independent with rolling walker: step to gait pattern, however able to correct with verbal cues for reciprocal gait pattern     Impairment/Limitation/Restriction for KOOS JR. Score on FOTO Knee Survey    Therapist reviewed KOOS scores for Sandra Lui on 11/7/2024.   KOOS documents entered into [employer] - see Media section.    Limitation Score: see media section    Limitation for Total FOTO Knee Survey  Limitation Score: see media section " "      Subjective     Date of surgery: 11/5/2024    History of current condition - Pat reports: she had a right total knee arthroplasty on 11/5/2024.     Falls: denies any recent falls    Imaging: please see initial evaluation.     Prior Therapy: yes, "many times"  Social History:  lives alone, but will have her daughter to help her  Occupation: retired hospice nurse; 4 steps to enter home with B hand rails  Prior Level of Function: independent; difficulty walking short and long distances, difficulty ambulating stairs, notes ascending stairs is more difficult than descending, intermediate use of SPC due to reports of R Knee buckling  Current Level of Function: significant difficulty with seated to standing transfers, pain and difficulty with AROM of right knee    Pain:  Current 9/10, worst 10/10, best 7/10   Location: right knee    Description: Aching, Dull, and Burning  Aggravating Factors: Sitting, Standing, Walking, Night Time, and Getting out of bed/chair  Easing Factors: pain medication, ice, rest, and elevation    Patients goals: walk with out pain     Medical History:   Past Medical History:   Diagnosis Date    Arthritis     Asthma     Depression     Disorder of kidney and ureter     GERD (gastroesophageal reflux disease)     Gout     Hyperlipidemia     Hypertension     Osteoporosis        Surgical History:   Sandra Lui  has a past surgical history that includes Tubal ligation; Hysterectomy; Tonsillectomy; Cholecystectomy; Ankle surgery; Cataract surgeries both sides around 2017; and Total knee replacement using computer navigation (Right, 11/5/2024).    Medications:   Sandra SWENSON has a current medication list which includes the following prescription(s): acetaminophen, acetaminophen, albuterol, albuterol, allopurinol, aspirin, bupropion, calcium carbonate, clobetasol, clopidogrel, colchicine, ergocalciferol (vitamin d2), famotidine, fluorometholone 0.1%, fluticasone propionate, furosemide, gabapentin, " ipratropium, latanoprost, losartan, methocarbamol, metoprolol succinate, fish oil-omega-3 fatty acids, oxycodone, pantoprazole, rosuvastatin, senna-docusate 8.6-50 mg, tramadol, and vitamin d.    Allergies:   Review of patient's allergies indicates:   Allergen Reactions    Adhesive      Skin peeling    Erythromycin      Vomiting, weakness, nausea    Neosporin (neomycin-polymyx) Rash        Treatment     Total Treatment time (time-based codes) separate from Evaluation: 45 minutes (1 self care, 1 NMR,1 TA)    Pat received the treatments listed below:      neuromuscular re-education activities to improve: Balance, Coordination, Proprioception, Posture, and motor control for 25 minutes. The following activities were included:  Hamstring inhibition with heel prop 30 seconds, 3x for full quad activation  Heel prop with hamstring inhibition: 3 minutes for full quad activation  Quad sets with towel under knee 5 second, 2x10  Long arc quads 5 seconds 2x10 with green strap  Ambulation from [employer] to [employer] gym > 250 feet with RW    THERAPEUTIC ACTIVITIES to improve dynamic and functional performance for 10 minutes including activities below.  Sit to stands  Lateral transfer with gait belt MIN Ax2    cold pack for 10 minutes to RLE with BLE.    Patient Education and Home Exercises     Self Care Home Management: 10 minutes    Education provided:   - HEP  - plan of care  -prognosis  - s/s DVT  -importance of range of motion for proper gait cycle and functional mobility  -[facility] Phone #: [phone].  - proper use of RW; ambulation of > 200 feet, proper height  - transfers: seated to standing, lateral transfer, safety    Written Home Exercises Provided: yes. Exercises were reviewed and Pat was able to demonstrate them prior to the end of the session.  Pat demonstrated good  understanding of the education provided. See EMR under Patient Instructions for exercises provided during therapy sessions.    Assessment      Sandra SWENSON is a 74 y.o. female referred to outpatient Physical Therapy with a medical diagnosis of M17.11 (ICD-10-CM) - Unilateral primary osteoarthritis, right knee. Patient presents to initial evaluation post op day 2 following R total knee arthroplasty. Patient demonstrates decreased lower extremity strength, poor right quadriceps motor control and strength, difficulty ambulating short and long distances, and reduced functional mobility. Patient would benefit from skilled outpatient physical therapy to address motor control, strength and range of motion deficits so that she may return to full independence with all household and personal ADL's, and improve overall functional mobility, reduce risk for falls and meet all social and recreational needs.      Patient prognosis is Good.   Patient will benefit from skilled outpatient Physical Therapy to address the deficits stated above and in the chart below, provide patient /family education, and to maximize patientt's level of independence.     Plan of care discussed with patient: Yes  Patient's spiritual, cultural and educational needs considered and patient is agreeable to the plan of care and goals as stated below:     Anticipated Barriers for therapy:  chronicity, age    Medical Necessity is demonstrated by the following  History  Co-morbidities and personal factors that may impact the plan of care [] LOW: no personal factors / co-morbidities  [] MODERATE: 1-2 personal factors / co-morbidities  [x] HIGH: 3+ personal factors / co-morbidities    Moderate / High Support Documentation:   Co-morbidities affecting plan of care: see medical chart    Personal Factors:   age     Examination  Body Structures and Functions, activity limitations and participation restrictions that may impact the plan of care [] LOW: addressing 1-2 elements  [] MODERATE: 3+ elements  [x] HIGH: 4+ elements (please support below)    Moderate / High Support Documentation: Limitations with  prolonged walking, difficulty with ADLs, decreased range of motion, decreased strength, difficulty with gait, neuromuscular re-education, and pain.       Clinical Presentation [x] LOW: stable  [] MODERATE: Evolving  [] HIGH: Unstable     Decision Making/ Complexity Score: low       Goals:  Short Term Goals (3 Weeks):   1. Patient will be independent with home exercise program to supplement physical therapy treatment in improving functional status.  2.Pt will improve impaired lower extremity manual muscle tests to >/= 4/5 to improve dynamic right knee support for closed chain tasks.  3. Patient will improve (right) knee active range of motion to 0-90 degrees to promote increased ease of sit<>stand transfers.  4. Patient will perform timed up and go with less restrictive assistive device in < 15 seconds to improve gait speed and safety with community ambulation.     Long Term Goals (6 Weeks):   1. Pt will improve impaired lower extremity manual muscle tests to >/= 4+/5 to improve dynamic right knee support for closed chain tasks.  2. Patient will improve the total FOTO Knee Survey Score to </= 40% limited to demonstrate increased perceived functional mobility.  3. Patient will improve score on KOOS Jr. section of FOTO Knee Survey to = </= 70% to demonstrate increased perceived functional mobility.  4. Patient will perform timed up and go with least restrictive assistive device in < 12 seconds to improve gait speed and safety with community ambulation.  5. Patient will perform at least 8 sit to stands in 30 seconds without UE support to demonstrate increased functional strength.   6. Patient will improve (right) knee active range of motion to 0-120 degrees to promote higher level transfers and transitions without limitation.       Plan     Plan of care Certification: 11/7/2024 to 1/202/2024.    Outpatient Physical Therapy 3-4 times weekly for 6 weeks to include the following interventions: Aquatic Therapy,  Cervical/Lumbar Traction, Electrical Stimulation TENS, Gait Training, Manual Therapy, Moist Heat/ Ice, Neuromuscular Re-ed, Orthotic Management and Training, Patient Education, Self Care, Therapeutic Activities, Therapeutic Exercise, and functional dry needling.     Yessi Echevarria, PT, DPT, OCS

## 2024-11-11 ENCOUNTER — DOCUMENTATION ONLY (OUTPATIENT)
Dept: REHABILITATION | Facility: HOSPITAL | Age: 74
End: 2024-11-11
Payer: MEDICARE

## 2024-11-11 NOTE — PROGRESS NOTES
[facility]   Physical Therapy Treatment Note      Name: Sandra Lui  Clinic Number: [id number]    Therapy Diagnosis: No diagnosis found.  Physician: No ref. provider found    Visit Date: 11/11/2024    Physician Orders: PT Eval and Treat   Medical Diagnosis from Referral: M17.11 (ICD-10-CM) - Unilateral primary osteoarthritis, right knee  Evaluation Date: 11/7/2024  Authorization Period Expiration: 12/31/2024  Plan of Care Expiration: 12/20/2024  Progress Note Due: 10th visit  Visit # / Visits authorized: 1/ 1   FOTO: 1/3     Precautions: Standard diabetes     Time In: 9:00 am  Time Out: 9:40 am  Total Billable Time: 00 minutes no charges dropped  PTA Visit #: 0/5       Subjective     Patient reports: her knee is very painful. Reports she called the doctors office at 7:30 yesterday and they increased her pain medication. Reports she feels light headed and she had a Boost shake this AM.  She  was somewhat  compliant with home exercise program.  Response to previous treatment: no adverse effects  Functional change: ambulation with RW    Pain: 10/10  Location: right knee      Objective      Objective Measures updated at progress report unless specified.     BP: 104/90 mmHg  HR: 77 bpm    Treatment     Pat received the treatments listed below:      neuromuscular re-education activities to improve: Balance, Coordination, Kinesthetic, Sense, and motor control for 00 minutes. The following activities were included:  Education provided:   - HEP  - plan of care  -prognosis  - s/s DVT  -importance of range of motion for proper gait cycle and functional mobility  -[facility] Phone #: [phone].  - proper use of RW; ambulation of > 200 feet, proper height  - transfers: seated to standing, lateral transfer, safety  -hydrate and eat       Patient Education and Home Exercises       Education provided: see above    Written Home Exercises Provided: Pt instructed to continue prior  HEP. Exercises were reviewed and Pat was able to demonstrate them prior to the end of the session.  Pat demonstrated good  understanding of the education provided. See Electronic Medical Record under Patient Instructions for exercises provided during therapy sessions    Assessment     PT held today due to increased pain and poor tolerance for mobility. Pateint ambulated with RW from [facility]. Attempted to perform NuStep, but patient reports of feeling light headed and perfuse sweating. BP taken reading 104/98 mmHg, 77 bpm. Patient placed in WC and sent home with instructions to eat, hydrate and perform HEP when able. Education  to continue to monitor condition and call [facility] line if needed.     Pat Is progressing well towards her goals.   Patient prognosis is Good.     Patient will continue to benefit from skilled outpatient physical therapy to address the deficits listed in the problem list box on initial evaluation, provide pt/family education and to maximize pt's level of independence in the home and community environment.     Patient's spiritual, cultural and educational needs considered and pt agreeable to plan of care and goals.     Anticipated barriers to physical therapy: chronicity, age     Goals:   Short Term Goals (3 Weeks):   1. Patient will be independent with home exercise program to supplement physical therapy treatment in improving functional status.  2.Pt will improve impaired lower extremity manual muscle tests to >/= 4/5 to improve dynamic right knee support for closed chain tasks.  3. Patient will improve (right) knee active range of motion to 0-90 degrees to promote increased ease of sit<>stand transfers.  4. Patient will perform timed up and go with less restrictive assistive device in < 15 seconds to improve gait speed and safety with community ambulation.     Long Term Goals (6 Weeks):   1. Pt will improve impaired lower extremity manual muscle tests to >/= 4+/5 to improve dynamic  right knee support for closed chain tasks.  2. Patient will improve the total FOTO Knee Survey Score to </= 40% limited to demonstrate increased perceived functional mobility.  3. Patient will improve score on KOOS Jr. section of FOTO Knee Survey to = </= 70% to demonstrate increased perceived functional mobility.  4. Patient will perform timed up and go with least restrictive assistive device in < 12 seconds to improve gait speed and safety with community ambulation.  5. Patient will perform at least 8 sit to stands in 30 seconds without UE support to demonstrate increased functional strength.   6. Patient will improve (right) knee active range of motion to 0-120 degrees to promote higher level transfers and transitions without limitation.    Plan     Plan of care Certification: 11/7/2024 to 1/202/2024.     Outpatient Physical Therapy 3-4 times weekly for 6 weeks to include the following interventions: Aquatic Therapy, Cervical/Lumbar Traction, Electrical Stimulation TENS, Gait Training, Manual Therapy, Moist Heat/ Ice, Neuromuscular Re-ed, Orthotic Management and Training, Patient Education, Self Care, Therapeutic Activities, Therapeutic Exercise, and functional dry needling.     Yessi Echevarria, PT, DPT, OCS

## 2024-11-13 ENCOUNTER — CLINICAL SUPPORT (OUTPATIENT)
Dept: REHABILITATION | Facility: HOSPITAL | Age: 74
End: 2024-11-13
Payer: MEDICARE

## 2024-11-13 DIAGNOSIS — M25.661 DECREASED RANGE OF MOTION (ROM) OF RIGHT KNEE: Primary | ICD-10-CM

## 2024-11-13 PROCEDURE — 97014 ELECTRIC STIMULATION THERAPY: CPT | Mod: KX,CQ

## 2024-11-13 PROCEDURE — 97112 NEUROMUSCULAR REEDUCATION: CPT | Mod: KX,CQ

## 2024-11-13 NOTE — PROGRESS NOTES
[facility] OUTPATIENT THERAPY AND WELLNESS   Physical Therapy Treatment Note     Name: Sandra Lui  [facility] Number: [id number]    Therapy Diagnosis:   Encounter Diagnosis   Name Primary?    Decreased range of motion (ROM) of right knee Yes     Physician: Sha Duran MD    Visit Date: 11/13/2024    Physician Orders: PT Eval and Treat   Medical Diagnosis from Referral: M17.11 (ICD-10-CM) - Unilateral primary osteoarthritis, right knee  Evaluation Date: 11/7/2024  Authorization Period Expiration: 12/31/2024  Plan of Care Expiration: 12/20/2024  Progress Note Due: 10th visit  Visit # / Visits authorized: 6 / 25  Episode Visit: 8  FOTO: 1/3    PTA Visit #: 1 / 5     Time In: 0900  Time Out: 1005  Total Treatment Time: 65 minutes  Total Billable Time: 57 minutes (4 NMR, 1 Estim Unattended)    Precautions: Standard, Diabetes    SUBJECTIVE     Patient reports: she feels better than she did previous. States that she ate a big breakfast and had a protein shake.  She was compliant with home exercise program.  Response to previous treatment: appropriate muscle response  Functional change: ongoing; ambulating with RW    Pain: 5/10, currently  Location: Right knee    Patient goals: walk without pain    OBJECTIVE     Objective Measures updated at progress report unless specified.   DOS 11/5/2024  Patient is 1 week, 1 days s/p Right TKA as of 11/13/2024.    11/7/2024 AROM: Right knee: 5-65 degrees, PROM: Right knee: 70 degrees  11/13/2024: Lacking 3 degrees Extension AROM; 78 degrees AAROM Flexion    Treatment     Pat received the treatments listed below:      therapeutic exercises to develop strength, endurance, ROM, and flexibility for 00 minutes including: NP    manual therapy techniques: Joint mobilizations were applied to the: Right knee for 00 minutes, including: NP    neuromuscular re-education activities to improve: Balance, Coordination, Kinesthetic, Sense, Proprioception, and motor control for 57 minutes. The  following activities were included:  Patient education: compliance with HEP, use of ice and elevation for swelling reduction, ROM expectations post TKA  Ambulation with RW from waiting room <> clinic. Emphasis on heel strike, terminal knee extension, toe off and knee flexion during gait cycle.  Heel prop; 3 minutes  Long sitting HS stretch; NP  Heel slides; 10 second hold, 10 reps with therapist assist    [place] Island[place] NMES: 10 sec on/30 sec off  - Quad sets with towel roll; 10 minutes  - Short arc quads with medium bolster; 10 minutes with use of strap  - Short arc quads with 1/2 foam; 10 minutes    therapeutic activities to improve functional performance for 00 minutes, including: NP    cold pack for 8 minutes to Right knee with Chaka LE's elevated on wedge.    Patient Education and Home Exercises     Home Exercises Provided and Patient Education Provided     Education provided:   - compliance with HEP  - plan of care  - prognosis  - s/s DVT  - importance of range of motion for proper gait cycle and functional mobility  - [facility] Phone #: [phone].  - proper use of RW; ambulation of > 200 feet, proper height  - transfers: seated to standing, lateral transfer, safety    Written Home Exercises Provided: Patient instructed to cont prior HEP. Exercises were reviewed and Pat was able to demonstrate them prior to the end of the session.  Pat demonstrated good  understanding of the education provided. See EMR under Patient Instructions for exercises provided during therapy sessions    ASSESSMENT     Pat is 1 week, 1 day s/p Right TKA. She presents ambulating with RW. There is visible redness along lower Right leg along bandages (see media). Encouraged patient monitor and contact MD if redness worsens or new symptoms develop. Introduced neuromuscular electrical stimulation with good tolerance. Requires use of strap for lifting assist during short arc quads. She demonstrates significant difficulty with  heel slides noting increased posterior and anterior knee discomfort; this improves with therapist assist. Measured 78 degrees AAROM Flexion post session. Will continue per POC towards treatment goals.  Pat Is progressing well towards her goals.   Pt prognosis is Good.     Pt will continue to benefit from skilled outpatient physical therapy to address the deficits listed in the problem list box on initial evaluation, provide pt/family education and to maximize pt's level of independence in the home and community environment.     Pt's spiritual, cultural and educational needs considered and pt agreeable to plan of care and goals.     Anticipated barriers to physical therapy:  chronicity, age    Goals:   Short Term Goals (3 Weeks): - Appropriate, ongoing  1. Patient will be independent with home exercise program to supplement physical therapy treatment in improving functional status.  2.Pt will improve impaired lower extremity manual muscle tests to >/= 4/5 to improve dynamic right knee support for closed chain tasks.  3. Patient will improve (right) knee active range of motion to 0-90 degrees to promote increased ease of sit<>stand transfers.  4. Patient will perform timed up and go with less restrictive assistive device in < 15 seconds to improve gait speed and safety with community ambulation.     Long Term Goals (6 Weeks): - Appropriate, ongoing  1. Pt will improve impaired lower extremity manual muscle tests to >/= 4+/5 to improve dynamic right knee support for closed chain tasks.  2. Patient will improve the total FOTO Knee Survey Score to </= 40% limited to demonstrate increased perceived functional mobility.  3. Patient will improve score on KOOS Jr. section of FOTO Knee Survey to = </= 70% to demonstrate increased perceived functional mobility.  4. Patient will perform timed up and go with least restrictive assistive device in < 12 seconds to improve gait speed and safety with community ambulation.  5. Patient  will perform at least 8 sit to stands in 30 seconds without UE support to demonstrate increased functional strength.   6. Patient will improve (right) knee active range of motion to 0-120 degrees to promote higher level transfers and transitions without limitation.     PLAN     Plan of Care Certification: 11/7/2024 to 12/20/2024.     Outpatient Physical Therapy 3-4 times weekly for 6 weeks to include the following interventions: Aquatic Therapy, Cervical/Lumbar Traction, Electrical Stimulation TENS, Gait Training, Manual Therapy, Moist Heat/ Ice, Neuromuscular Re-ed, Orthotic Management and Training, Patient Education, Self Care, Therapeutic Activities, Therapeutic Exercise, and functional dry needling.     PT/PTA met face to face to discuss patient's treatment plan and progress towards established goals. Patient will be seen by physical therapist every sixth visit and minimally once per month.    Cristy Cordova PTA

## 2024-11-15 ENCOUNTER — CLINICAL SUPPORT (OUTPATIENT)
Dept: REHABILITATION | Facility: HOSPITAL | Age: 74
End: 2024-11-15
Payer: MEDICARE

## 2024-11-15 DIAGNOSIS — M25.661 DECREASED RANGE OF MOTION (ROM) OF RIGHT KNEE: Primary | ICD-10-CM

## 2024-11-15 PROCEDURE — 97112 NEUROMUSCULAR REEDUCATION: CPT | Mod: KX

## 2024-11-15 PROCEDURE — 97530 THERAPEUTIC ACTIVITIES: CPT | Mod: KX

## 2024-11-15 PROCEDURE — 97014 ELECTRIC STIMULATION THERAPY: CPT | Mod: KX

## 2024-11-15 NOTE — PROGRESS NOTES
[facility] OUTPATIENT THERAPY AND WELLNESS   Physical Therapy Treatment Note     Name: Sandra Lui  [facility] Number: [id number]    Therapy Diagnosis:   Encounter Diagnosis   Name Primary?    Decreased range of motion (ROM) of right knee Yes     Physician: Sha Duran MD    Visit Date: 11/15/2024    Physician Orders: PT Eval and Treat   Medical Diagnosis from Referral: M17.11 (ICD-10-CM) - Unilateral primary osteoarthritis, right knee  Evaluation Date: 11/7/2024  Authorization Period Expiration: 12/31/2024  Plan of Care Expiration: 12/20/2024  Progress Note Due: 10th visit  Visit # / Visits authorized: 7 / 25  Episode Visit: 8  FOTO: 1/3    PTA Visit #: 0 / 5     Time In: 9:00 am  Time Out: 10:15 am  Total Treatment Time: 65 minutes  Total Billable Time: 60 minutes (1 TA, 3 NMR, 1 Estim Unattended)    Precautions: Standard, Diabetes    SUBJECTIVE     Patient reports: doing a bit better, but continues to have pain. Reports she had a rough night last night  She was compliant with home exercise program.  Response to previous treatment: appropriate muscle response  Functional change: ongoing; ambulating with RW    Pain: 5/10, currently  Location: Right knee    Patient goals: walk without pain    OBJECTIVE     Objective Measures updated at progress report unless specified.   DOS 11/5/2024  Patient is 1 week, 1 days s/p Right TKA as of 11/13/2024.    11/7/2024 AROM: Right knee: 5-65 degrees, PROM: Right knee: 70 degrees  11/13/2024: Lacking 3 degrees Extension AROM; 78 degrees AAROM Flexion  11/15/2024: lacking 4; 0-82 AA/PROM at end of session    Treatment     Pat received the treatments listed below:        manual therapy techniques: Joint mobilizations were applied to the: Right knee for 2 minutes, including:   Patella mobilizations    neuromuscular re-education activities to improve: Balance, Coordination, Kinesthetic, Sense, Proprioception, and motor control for 57 minutes. The following activities were  included:  Patient education: compliance with HEP, use of ice and elevation for swelling reduction, ROM expectations post TKA    Ambulation with RW from waiting room <> clinic. Emphasis on heel strike, terminal knee extension, toe off and knee flexion during gait cycle.  Heel prop; 3 minutes for full quad activation  Long sitting HS stretch for full quad activation: 30 seconds, 4x  Heel slides; 10 second hold, 10 reps with therapist assist for normal gait mechanics  NuStep for normal gait mechanics: 10 minutes      [place] NMES: 10 sec on/30 sec off  - long arc quad 15 minutes  Held:  - Quad sets with towel roll; 10 minutes  - Short arc quads with medium bolster; 10 minutes with use of strap  - Short arc quads with 1/2 foam; 10 minutes    therapeutic activities to improve functional performance for 00 minutes, including: NP  Sit to stands: 2x10     cold pack for 10 minutes to Right knee with Chaka LE's elevated on wedge    Patient Education and Home Exercises     Home Exercises Provided and Patient Education Provided     Education provided:   - compliance with HEP  - plan of care  - prognosis  - s/s DVT  - importance of range of motion for proper gait cycle and functional mobility  - [facility] Phone #: [phone].  - proper use of RW; ambulation of > 200 feet, proper height  - transfers: seated to standing, lateral transfer, safety    Written Home Exercises Provided: Patient instructed to cont prior HEP. Exercises were reviewed and Pat was able to demonstrate them prior to the end of the session.  Pat demonstrated good  understanding of the education provided. See EMR under Patient Instructions for exercises provided during therapy sessions    ASSESSMENT     Pat is 1 week, 3 days s/p Right TKA. Resumed neuromuscular electrical stimulation with good tolerance performing LAQ's. Demonstrated 0-82 degrees post manual interventions; initially lacking 4 degrees hyperextension prior to heel prop. Heavy  education on heel propping and hamstring stretching at home for improved tolerance for knee extension. Negative s/s of DVT per Well's Criteria; notable improvements in LE swelling.    Pat Is progressing well towards her goals.   Pt prognosis is Good.     Pt will continue to benefit from skilled outpatient physical therapy to address the deficits listed in the problem list box on initial evaluation, provide pt/family education and to maximize pt's level of independence in the home and community environment.     Pt's spiritual, cultural and educational needs considered and pt agreeable to plan of care and goals.     Anticipated barriers to physical therapy:  chronicity, age    Goals:   Short Term Goals (3 Weeks): - Appropriate, ongoing  1. Patient will be independent with home exercise program to supplement physical therapy treatment in improving functional status.  2.Pt will improve impaired lower extremity manual muscle tests to >/= 4/5 to improve dynamic right knee support for closed chain tasks.  3. Patient will improve (right) knee active range of motion to 0-90 degrees to promote increased ease of sit<>stand transfers.  4. Patient will perform timed up and go with less restrictive assistive device in < 15 seconds to improve gait speed and safety with community ambulation.     Long Term Goals (6 Weeks): - Appropriate, ongoing  1. Pt will improve impaired lower extremity manual muscle tests to >/= 4+/5 to improve dynamic right knee support for closed chain tasks.  2. Patient will improve the total FOTO Knee Survey Score to </= 40% limited to demonstrate increased perceived functional mobility.  3. Patient will improve score on KOOS Jr. section of FOTO Knee Survey to = </= 70% to demonstrate increased perceived functional mobility.  4. Patient will perform timed up and go with least restrictive assistive device in < 12 seconds to improve gait speed and safety with community ambulation.  5. Patient will perform at  least 8 sit to stands in 30 seconds without UE support to demonstrate increased functional strength.   6. Patient will improve (right) knee active range of motion to 0-120 degrees to promote higher level transfers and transitions without limitation.     PLAN     Plan of Care Certification: 11/7/2024 to 12/20/2024.     Outpatient Physical Therapy 3-4 times weekly for 6 weeks to include the following interventions: Aquatic Therapy, Cervical/Lumbar Traction, Electrical Stimulation TENS, Gait Training, Manual Therapy, Moist Heat/ Ice, Neuromuscular Re-ed, Orthotic Management and Training, Patient Education, Self Care, Therapeutic Activities, Therapeutic Exercise, and functional dry needling.     PT/PTA met face to face to discuss patient's treatment plan and progress towards established goals. Patient will be seen by physical therapist every sixth visit and minimally once per month.    Yessi Echevarria, PT, DPT, OCS

## 2024-11-18 ENCOUNTER — CLINICAL SUPPORT (OUTPATIENT)
Dept: REHABILITATION | Facility: HOSPITAL | Age: 74
End: 2024-11-18
Payer: MEDICARE

## 2024-11-18 DIAGNOSIS — Z96.651 S/P TOTAL KNEE REPLACEMENT, RIGHT: ICD-10-CM

## 2024-11-18 DIAGNOSIS — M25.661 DECREASED RANGE OF MOTION (ROM) OF RIGHT KNEE: Primary | ICD-10-CM

## 2024-11-18 PROCEDURE — 97112 NEUROMUSCULAR REEDUCATION: CPT | Mod: KX

## 2024-11-18 PROCEDURE — 97014 ELECTRIC STIMULATION THERAPY: CPT | Mod: KX

## 2024-11-18 PROCEDURE — 97530 THERAPEUTIC ACTIVITIES: CPT | Mod: KX

## 2024-11-18 RX ORDER — DEXTROMETHORPHAN HYDROBROMIDE, GUAIFENESIN 5; 100 MG/5ML; MG/5ML
650 LIQUID ORAL EVERY 8 HOURS
Qty: 120 TABLET | Refills: 0 | Status: SHIPPED | OUTPATIENT
Start: 2024-11-18

## 2024-11-18 RX ORDER — OXYCODONE HYDROCHLORIDE 5 MG/1
TABLET ORAL
Qty: 40 TABLET | Refills: 0 | Status: SHIPPED | OUTPATIENT
Start: 2024-11-18

## 2024-11-18 NOTE — PROGRESS NOTES
[facility]PAT[facility]ERAPY [facility]   Physical Therapy Treatment Note     Name: Sandra Lui  [facility] Number: [id number]    Therapy Diagnosis:   Encounter Diagnosis   Name Primary?    Decreased range of motion (ROM) of right knee Yes     Physician: Sha Duran MD    Visit Date: 11/18/2024    Physician Orders: PT Eval and Treat   Medical Diagnosis from Referral: M17.11 (ICD-10-CM) - Unilateral primary osteoarthritis, right knee  Evaluation Date: 11/7/2024  Authorization Period Expiration: 12/31/2024  Plan of Care Expiration: 12/20/2024  Progress Note Due: 10th visit  Visit # / Visits authorized: 9 / 25  Episode Visit: 8  FOTO: 1/3    PTA Visit #: 0 / 5     Time In: 9:00 am  Time Out: 10:25 am  Total Treatment Time: 85 minutes  Total Billable Time: 30 minutes (1 TA, 1 NMR, 1 Estim Unattended)    Precautions: Standard, Diabetes    SUBJECTIVE     Patient reports:she is doing better in general. Reports she is ready to get the bandage off.   She was compliant with home exercise program.  Response to previous treatment: appropriate muscle response  Functional change: ongoing; ambulating with RW    Pain: 5/10, currently  Location: Right knee    Patient goals: walk without pain    OBJECTIVE     Objective Measures updated at progress report unless specified.   DOS 11/5/2024  Patient is 1 week, 1 days s/p Right TKA as of 11/13/2024.    11/7/2024 AROM: Right knee: 5-65 degrees, PROM: Right knee: 70 degrees  11/13/2024: Lacking 3 degrees Extension AROM; 78 degrees AAROM Flexion  11/15/2024: lacking 4; 0-82 AA/PROM at end of session  11/18/2024: lacking 2- 80 AAROM    Treatment     Pat received the treatments listed below:        manual therapy techniques: Joint mobilizations were applied to the: Right knee for 00 minutes, including:   Patella mobilizations    neuromuscular re-education activities to improve: Balance, Coordination, Kinesthetic, Sense, Proprioception, and motor control for 75 minutes. The following  activities were included:  Patient education: compliance with HEP, use of ice and elevation for swelling reduction, ROM expectations post TKA    Ambulation with RW from waiting room <> clinic. Emphasis on heel strike, terminal knee extension, toe off and knee flexion during gait cycle.  Heel prop; 3 minutes for full quad activation  Long sitting HS stretch for full quad activation: 30 seconds, 4x  Heel slides; 10 second hold, 10 reps with therapist assist for normal gait mechanics  NuStep for normal gait mechanics: 10 minutes    [place] NMES: 10 sec on/30 sec off  - long arc quad 15 minutes  - Quad sets with towel roll; 15 minutes  - Short arc quads with medium bolster; 15 minutes with use of strap  - Short arc quads with 1/2 foam; 15 minutes    therapeutic activities to improve functional performance for 10 minutes, including:  Sit to stands: 2x10 at hi lo table    cold pack for 00 minutes to Right knee with Chaka LE's elevated on wedge    Patient Education and Home Exercises     Home Exercises Provided and Patient Education Provided     Education provided:   - compliance with HEP  - plan of care  - prognosis  - s/s DVT  - importance of range of motion for proper gait cycle and functional mobility  - [facility] Phone #: [phone].  - proper use of RW; ambulation of > 200 feet, proper height  - transfers: seated to standing, lateral transfer, safety    Written Home Exercises Provided: Patient instructed to cont prior HEP. Exercises were reviewed and Pat was able to demonstrate them prior to the end of the session.  Pat demonstrated good  understanding of the education provided. See EMR under Patient Instructions for exercises provided during therapy sessions    ASSESSMENT     Pat is 1 week, 6 days s/p Right TKA. Resumed neuromuscular electrical stimulation with improving tolerance; decreasing reliance on green strap for terminal knee extension . Demonstrated 2-80 degrees of AROM this session; able  to passively reach 0 degrees extension, however poor tolerance.  Negative s/s of DVT per Well's Criteria.    Pat Is progressing well towards her goals.   Pt prognosis is Good.     Pt will continue to benefit from skilled outpatient physical therapy to address the deficits listed in the problem list box on initial evaluation, provide pt/family education and to maximize pt's level of independence in the home and community environment.     Pt's spiritual, cultural and educational needs considered and pt agreeable to plan of care and goals.     Anticipated barriers to physical therapy:  chronicity, age    Goals:   Short Term Goals (3 Weeks): - Appropriate, ongoing  1. Patient will be independent with home exercise program to supplement physical therapy treatment in improving functional status.  2.Pt will improve impaired lower extremity manual muscle tests to >/= 4/5 to improve dynamic right knee support for closed chain tasks.  3. Patient will improve (right) knee active range of motion to 0-90 degrees to promote increased ease of sit<>stand transfers.  4. Patient will perform timed up and go with less restrictive assistive device in < 15 seconds to improve gait speed and safety with community ambulation.     Long Term Goals (6 Weeks): - Appropriate, ongoing  1. Pt will improve impaired lower extremity manual muscle tests to >/= 4+/5 to improve dynamic right knee support for closed chain tasks.  2. Patient will improve the total FOTO Knee Survey Score to </= 40% limited to demonstrate increased perceived functional mobility.  3. Patient will improve score on KOOS Jr. section of FOTO Knee Survey to = </= 70% to demonstrate increased perceived functional mobility.  4. Patient will perform timed up and go with least restrictive assistive device in < 12 seconds to improve gait speed and safety with community ambulation.  5. Patient will perform at least 8 sit to stands in 30 seconds without UE support to demonstrate  increased functional strength.   6. Patient will improve (right) knee active range of motion to 0-120 degrees to promote higher level transfers and transitions without limitation.     PLAN     Plan of Care Certification: 11/7/2024 to 12/20/2024.     Outpatient Physical Therapy 3-4 times weekly for 6 weeks to include the following interventions: Aquatic Therapy, Cervical/Lumbar Traction, Electrical Stimulation TENS, Gait Training, Manual Therapy, Moist Heat/ Ice, Neuromuscular Re-ed, Orthotic Management and Training, Patient Education, Self Care, Therapeutic Activities, Therapeutic Exercise, and functional dry needling.     PT/PTA met face to face to discuss patient's treatment plan and progress towards established goals. Patient will be seen by physical therapist every sixth visit and minimally once per month.    Yessi Echevarria, PT, DPT, OCS

## 2024-11-20 ENCOUNTER — OFFICE VISIT (OUTPATIENT)
Dept: ORTHOPEDICS | Facility: CLINIC | Age: 74
End: 2024-11-20
Payer: MEDICARE

## 2024-11-20 ENCOUNTER — CLINICAL SUPPORT (OUTPATIENT)
Dept: REHABILITATION | Facility: HOSPITAL | Age: 74
End: 2024-11-20
Payer: MEDICARE

## 2024-11-20 DIAGNOSIS — Z96.651 S/P TOTAL KNEE REPLACEMENT, RIGHT: ICD-10-CM

## 2024-11-20 DIAGNOSIS — M25.661 DECREASED RANGE OF MOTION (ROM) OF RIGHT KNEE: Primary | ICD-10-CM

## 2024-11-20 DIAGNOSIS — Z96.651 S/P TOTAL KNEE REPLACEMENT, RIGHT: Primary | ICD-10-CM

## 2024-11-20 PROCEDURE — 97014 ELECTRIC STIMULATION THERAPY: CPT | Mod: KX

## 2024-11-20 PROCEDURE — 1159F MED LIST DOCD IN RCRD: CPT | Mod: CPTII,S$GLB,, | Performed by: NURSE PRACTITIONER

## 2024-11-20 PROCEDURE — 99999 PR PBB SHADOW E&M-EST. PATIENT-LVL IV: CPT | Mod: PBBFAC,,, | Performed by: NURSE PRACTITIONER

## 2024-11-20 PROCEDURE — 97530 THERAPEUTIC ACTIVITIES: CPT | Mod: KX

## 2024-11-20 PROCEDURE — 99024 POSTOP FOLLOW-UP VISIT: CPT | Mod: S$GLB,,, | Performed by: NURSE PRACTITIONER

## 2024-11-20 PROCEDURE — 97112 NEUROMUSCULAR REEDUCATION: CPT | Mod: KX

## 2024-11-20 PROCEDURE — 1160F RVW MEDS BY RX/DR IN RCRD: CPT | Mod: CPTII,S$GLB,, | Performed by: NURSE PRACTITIONER

## 2024-11-20 PROCEDURE — 1125F AMNT PAIN NOTED PAIN PRSNT: CPT | Mod: CPTII,S$GLB,, | Performed by: NURSE PRACTITIONER

## 2024-11-20 RX ORDER — METHOCARBAMOL 750 MG/1
750 TABLET, FILM COATED ORAL 4 TIMES DAILY PRN
Qty: 40 TABLET | Refills: 0 | Status: SHIPPED | OUTPATIENT
Start: 2024-11-20 | End: 2024-12-01

## 2024-11-20 NOTE — PROGRESS NOTES
[facility]   Physical Therapy Treatment Note     Name: Sandra Lui  [facility] Number: [id number]    Therapy Diagnosis:   Encounter Diagnosis   Name Primary?    Decreased range of motion (ROM) of right knee Yes     Physician: Sha Duran MD    Visit Date: 11/20/2024    Physician Orders: PT Eval and Treat   Medical Diagnosis from Referral: M17.11 (ICD-10-CM) - Unilateral primary osteoarthritis, right knee  Evaluation Date: 11/7/2024  Authorization Period Expiration: 12/31/2024  Plan of Care Expiration: 12/20/2024  Progress Note Due: 10th visit  Visit # / Visits authorized: 8 / 25  Episode Visit: 8  FOTO: 1/3    PTA Visit #: 0 / 5     Time In: 8:00 am  Time Out: 9:03 am  Total Treatment Time: 63 minutes  Total Billable Time: 25 minutes ( 1 TA, 1 NMR, 1 Estim Unattended)    Precautions: Standard, Diabetes    SUBJECTIVE     Patient reports: she is doing okay today; reports she has been taking her pain medication over the last 4 days. Reports she has MD follow up today after PT.   She was compliant with home exercise program.  Response to previous treatment: appropriate muscle response  Functional change: ongoing; ambulating with RW    Pain: 5/10, currently  Location: Right knee    Patient goals: walk without pain    OBJECTIVE     Objective Measures updated at progress report unless specified.   DOS 11/5/2024  Patient is 1 week, 1 days s/p Right TKA as of 11/13/2024.    11/7/2024 AROM: Right knee: 5-65 degrees, PROM: Right knee: 70 degrees  11/13/2024: Lacking 3 degrees Extension AROM; 78 degrees AAROM Flexion  11/15/2024: lacking 4; 0-82 AA/PROM at end of session  11/18/2024: lacking 2- 80 AAROM  11/20/2024:5- 95 AAROM    Treatment     Pat received the treatments listed below:        manual therapy techniques: Joint mobilizations were applied to the: Right knee for 00 minutes, including:   Patella mobilizations    neuromuscular re-education activities to improve: Balance, Coordination,  Kinesthetic, Sense, Proprioception, and motor control for 48 minutes. The following activities were included:  Patient education: compliance with HEP, use of ice and elevation for swelling reduction, ROM expectations post TKA    Ambulation with RW from waiting room <> clinic. Emphasis on heel strike, terminal knee extension, toe off and knee flexion during gait cycle.  Heel prop; 3 minutes for full quad activation  Long sitting HS stretch for full quad activation: 30 seconds, 4x  Heel slides; 10 second hold, 10 reps with therapist assist for normal gait mechanics  NuStep for normal gait mechanics: 8 minutes    Armenian NMES: 10 sec on/30 sec off  - Quad sets with towel roll; 15 minutes  - Short arc quads with medium bolster; 15 minutes    therapeutic activities to improve functional performance for 15 minutes, including:  Sit to stands: 2x10 with PT assist with airex on chair  Step ups: 2x10 4 inch step    cold pack for 10 minutes to Right knee with Chaka LE's elevated on wedge    Patient Education and Home Exercises     Home Exercises Provided and Patient Education Provided     Education provided:   - compliance with HEP  - plan of care  - prognosis  - s/s DVT  - importance of range of motion for proper gait cycle and functional mobility  - [facility] Phone #: [phone].  - proper use of RW; ambulation of > 200 feet, proper height  - transfers: seated to standing, lateral transfer, safety    Written Home Exercises Provided: Patient instructed to cont prior HEP. Exercises were reviewed and Pat was able to demonstrate them prior to the end of the session.  Pat demonstrated good  understanding of the education provided. See EMR under Patient Instructions for exercises provided during therapy sessions    ASSESSMENT     Pat is 2 week, 1 days s/p Right TKA. Resumed neuromuscular electrical stimulation with improving tolerance; performed short arc quads without using a strap for assistance. Demonstrated  5-95 degrees of AAROM this session. Poor tolerance for knee extension; heavy education regarding importance of heel propping. Significant difficulty with seated to standing transfers.     Pat Is progressing well towards her goals.   Pt prognosis is Good.     Pt will continue to benefit from skilled outpatient physical therapy to address the deficits listed in the problem list box on initial evaluation, provide pt/family education and to maximize pt's level of independence in the home and community environment.     Pt's spiritual, cultural and educational needs considered and pt agreeable to plan of care and goals.     Anticipated barriers to physical therapy:  chronicity, age    Goals:   Short Term Goals (3 Weeks): - Appropriate, ongoing  1. Patient will be independent with home exercise program to supplement physical therapy treatment in improving functional status.  2.Pt will improve impaired lower extremity manual muscle tests to >/= 4/5 to improve dynamic right knee support for closed chain tasks.  3. Patient will improve (right) knee active range of motion to 0-90 degrees to promote increased ease of sit<>stand transfers.  4. Patient will perform timed up and go with less restrictive assistive device in < 15 seconds to improve gait speed and safety with community ambulation.     Long Term Goals (6 Weeks): - Appropriate, ongoing  1. Pt will improve impaired lower extremity manual muscle tests to >/= 4+/5 to improve dynamic right knee support for closed chain tasks.  2. Patient will improve the total FOTO Knee Survey Score to </= 40% limited to demonstrate increased perceived functional mobility.  3. Patient will improve score on KOOS Jr. section of FOTO Knee Survey to = </= 70% to demonstrate increased perceived functional mobility.  4. Patient will perform timed up and go with least restrictive assistive device in < 12 seconds to improve gait speed and safety with community ambulation.  5. Patient will perform  at least 8 sit to stands in 30 seconds without UE support to demonstrate increased functional strength.   6. Patient will improve (right) knee active range of motion to 0-120 degrees to promote higher level transfers and transitions without limitation.     PLAN     Plan of Care Certification: 11/7/2024 to 12/20/2024.     Outpatient Physical Therapy 3-4 times weekly for 6 weeks to include the following interventions: Aquatic Therapy, Cervical/Lumbar Traction, Electrical Stimulation TENS, Gait Training, Manual Therapy, Moist Heat/ Ice, Neuromuscular Re-ed, Orthotic Management and Training, Patient Education, Self Care, Therapeutic Activities, Therapeutic Exercise, and functional dry needling.     PT/PTA met face to face to discuss patient's treatment plan and progress towards established goals. Patient will be seen by physical therapist every sixth visit and minimally once per month.    Yessi Echevarria, PT, DPT, OCS

## 2024-11-20 NOTE — PROGRESS NOTES
Sandra Lui presents for initial post-operative visit following a right total knee arthroplasty performed by Dr. Duran on 11/5/2024. She reports that she has had a lot of pain in the knee due to only being given 1 pain pill twice a day by her provider at home. She comes with her daughter today and she reports that the pain medication was refilled yesterday and the patient is in possession of it and will be able to take it more frequently now that she's more independent.     Exam:     Ambulating well with assistive device.  Incision is clean and dry without drainage or erythema.   ROM:5-95    Initial post-operative radiographs reviewed today revealing a well fixed and aligned prosthesis.    A/P:  2 weeks s/p right total knee arthroplasty    - The patient was advised to keep the incision clean and dry for the next 24 hours after which she may wash the area with antibacterial soap in the shower. Will not submerge until the incision is completely healed.   - Outpatient PT ongoing: at the [place] location  - Continue aspirin for 1 month post op  - Pain medication: refilled yesterday    - Follow up in 4 weeks with xrays. Pt will call clinic with problems/concerns.

## 2024-11-22 ENCOUNTER — CLINICAL SUPPORT (OUTPATIENT)
Dept: REHABILITATION | Facility: HOSPITAL | Age: 74
End: 2024-11-22
Payer: MEDICARE

## 2024-11-22 DIAGNOSIS — M25.661 DECREASED RANGE OF MOTION (ROM) OF RIGHT KNEE: Primary | ICD-10-CM

## 2024-11-22 PROCEDURE — 97014 ELECTRIC STIMULATION THERAPY: CPT | Mod: KX,CQ

## 2024-11-22 PROCEDURE — 97112 NEUROMUSCULAR REEDUCATION: CPT | Mod: KX,CQ

## 2024-11-22 NOTE — PROGRESS NOTES
[facility]   Physical Therapy Treatment Note     Name: Sandra Lui  [facility] Number: [id number]    Therapy Diagnosis:   Encounter Diagnosis   Name Primary?    Decreased range of motion (ROM) of right knee Yes     Physician: Sha Duran MD    Visit Date: 11/22/2024    Physician Orders: PT Eval and Treat   Medical Diagnosis from Referral: M17.11 (ICD-10-CM) - Unilateral primary osteoarthritis, right knee  Evaluation Date: 11/7/2024  Authorization Period Expiration: 12/31/2024  Plan of Care Expiration: 12/20/2024  Progress Note Due: 10th visit  Visit # / Visits authorized: 10 / 25  Episode Visit: 12  FOTO: 2/3    PTA Visit #: 1 / 5     Time In: 0900  Time Out: 1010  Total Treatment Time: 70 minutes  Total Billable Time: 60 minutes (4 NMR, 1 Estim Unattended)    Precautions: Standard, Diabetes    SUBJECTIVE     Patient reports: back of the leg is still tight. She saw the MD on Wednesday and was given a cane. Reports that she does her heel prop and hamstring stretch one time a day,  She was compliant with home exercise program.  Response to previous treatment: appropriate muscle response  Functional change: ongoing; ambulating with RW    Pain: 5/10, currently  Location: Right knee    Patient goals: walk without pain    OBJECTIVE     Objective Measures updated at progress report unless specified.   DOS 11/5/2024  Patient is 2 weeks, 3 days s/p Right TKA as of 11/22/2024.    11/7/2024 AROM: Right knee: 5-65 degrees, PROM: Right knee: 70 degrees  11/13/2024: Lacking 3 degrees Extension AROM; 78 degrees AAROM Flexion  11/15/2024: lacking 4; 0-82 AA/PROM at end of session  11/18/2024: lacking 2- 80 AAROM  11/20/2024: 5- 95 AAROM  11/22/2024: Lacking 5 degrees AROM Extension; 95 degrees AAROM Flexion     Treatment     Pat received the treatments listed below:      manual therapy techniques: Joint mobilizations were applied to the: Right knee for 00 minutes, including:   Patella  mobilizations    neuromuscular re-education activities to improve: Balance, Coordination, Kinesthetic, Sense, Proprioception, and motor control for 70 minutes. The following activities were included:  Patient education: compliance with HEP, use of ice and elevation for swelling reduction, ROM expectations post TKA    Ambulation in clinic with single point cane. Emphasis on heel strike, terminal knee extension, toe off and knee flexion during gait cycle.  Heel prop; 3 minutes for full quad activation  Long sitting HS stretch for full quad activation: 30 seconds, 4 reps  Heel slides; 10 second hold, 10 reps with therapist assist for normal gait mechanics  NuStep for normal gait mechanics: 8 minutes - NP    Greenlandic NMES: 10 sec on/30 sec off  - Quad sets with towel roll; 10 minutes - mod/max cueing for quad isolation and activation  - Short arc quads with medium bolster; 10 minutes  - Short arc quads with 1/2 foam; 10 minutes    therapeutic activities to improve functional performance for 00 minutes, including:  Sit to stands in std chair + airex: 2 x 10 reps with PT assist   Step ups onto 4-inch step: 2 x 10 reps     cold pack for 00 minutes to Right knee with Chaka LE's elevated on wedge. (Not today. Patient declined.)    Patient Education and Home Exercises     Home Exercises Provided and Patient Education Provided     Education provided:   - compliance with HEP  - plan of care  - prognosis  - s/s DVT  - importance of range of motion for proper gait cycle and functional mobility  - [facility] Phone #: [phone].  - proper use of RW; ambulation of > 200 feet, proper height  - transfers: seated to standing, lateral transfer, safety    Written Home Exercises Provided: Patient instructed to cont prior HEP. Exercises were reviewed and Pat was able to demonstrate them prior to the end of the session.  Pat demonstrated good  understanding of the education provided. See EMR under Patient Instructions for  exercises provided during therapy sessions    ASSESSMENT     Pat is 2 week, 3 days s/p Right TKA. She presents ambulating with RW. Performed single point cane ambulation in clinic. There is visible hesitation with a step to gait pattern and decreased stance time on RLE observed. Overall good tolerance to therapeutic interventions noting adequate muscle response throughout. Lacking 5 degrees AROM extension, 95 degrees AAROM Flexion. Will continue per POC towards treatment goals.  Pat Is progressing well towards her goals.   Pt prognosis is Good.     Pt will continue to benefit from skilled outpatient physical therapy to address the deficits listed in the problem list box on initial evaluation, provide pt/family education and to maximize pt's level of independence in the home and community environment.     Pt's spiritual, cultural and educational needs considered and pt agreeable to plan of care and goals.     Anticipated barriers to physical therapy:  chronicity, age    Goals:   Short Term Goals (3 Weeks): - Appropriate, ongoing  1. Patient will be independent with home exercise program to supplement physical therapy treatment in improving functional status.  2.Pt will improve impaired lower extremity manual muscle tests to >/= 4/5 to improve dynamic right knee support for closed chain tasks.  3. Patient will improve (right) knee active range of motion to 0-90 degrees to promote increased ease of sit<>stand transfers.  4. Patient will perform timed up and go with less restrictive assistive device in < 15 seconds to improve gait speed and safety with community ambulation.     Long Term Goals (6 Weeks): - Appropriate, ongoing  1. Pt will improve impaired lower extremity manual muscle tests to >/= 4+/5 to improve dynamic right knee support for closed chain tasks.  2. Patient will improve the total FOTO Knee Survey Score to </= 40% limited to demonstrate increased perceived functional mobility.  3. Patient will improve  score on KOOS Jr. section of FOTO Knee Survey to = </= 70% to demonstrate increased perceived functional mobility.  4. Patient will perform timed up and go with least restrictive assistive device in < 12 seconds to improve gait speed and safety with community ambulation.  5. Patient will perform at least 8 sit to stands in 30 seconds without UE support to demonstrate increased functional strength.   6. Patient will improve (right) knee active range of motion to 0-120 degrees to promote higher level transfers and transitions without limitation.     PLAN     Plan of Care Certification: 11/7/2024 to 12/20/2024.     Outpatient Physical Therapy 3-4 times weekly for 6 weeks to include the following interventions: Aquatic Therapy, Cervical/Lumbar Traction, Electrical Stimulation TENS, Gait Training, Manual Therapy, Moist Heat/ Ice, Neuromuscular Re-ed, Orthotic Management and Training, Patient Education, Self Care, Therapeutic Activities, Therapeutic Exercise, and functional dry needling.     PT/PTA met face to face to discuss patient's treatment plan and progress towards established goals. Patient will be seen by physical therapist every sixth visit and minimally once per month.    Cristy Cordova PTA

## 2024-11-25 ENCOUNTER — CLINICAL SUPPORT (OUTPATIENT)
Dept: REHABILITATION | Facility: HOSPITAL | Age: 74
End: 2024-11-25
Payer: MEDICARE

## 2024-11-25 DIAGNOSIS — M25.661 DECREASED RANGE OF MOTION (ROM) OF RIGHT KNEE: Primary | ICD-10-CM

## 2024-11-25 PROCEDURE — 97112 NEUROMUSCULAR REEDUCATION: CPT | Mod: KX,CQ

## 2024-11-25 PROCEDURE — 97014 ELECTRIC STIMULATION THERAPY: CPT | Mod: KX,CQ

## 2024-11-25 NOTE — PROGRESS NOTES
[facility]   Physical Therapy Treatment Note     Name: Sandra Lui  [facility] Number: [id number]    Therapy Diagnosis:   Encounter Diagnosis   Name Primary?    Decreased range of motion (ROM) of right knee Yes     Physician: Sha Duran MD    Visit Date: 11/25/2024    Physician Orders: PT Eval and Treat   Medical Diagnosis from Referral: M17.11 (ICD-10-CM) - Unilateral primary osteoarthritis, right knee  Evaluation Date: 11/7/2024  Authorization Period Expiration: 12/31/2024  Plan of Care Expiration: 12/20/2024  Progress Note Due: 10th visit  Visit # / Visits authorized: 11 / 25  Episode Visit: 13  FOTO: 2/3    PTA Visit #: 2 / 5     Time In: 0900  Time Out: 1015  Total Treatment Time: 75 minutes  Total Billable Time: 30 minutes (2 NMR, 1 Estim Unattended)    Precautions: Standard, Diabetes    SUBJECTIVE     Patient reports: the back of her leg still feels very tight, but it is getting better. She reports increasing frequency of her stretches at home over the weekend.  She was compliant with home exercise program.  Response to previous treatment: appropriate muscle response  Functional change: ongoing; ambulating with RW    Pain: NT/10, currently   Location: Right knee    Patient goals: walk without pain    OBJECTIVE     Objective Measures updated at progress report unless specified.   DOS 11/5/2024  Patient is 2 weeks, 6 days s/p Right TKA as of 11/25/2024.    11/7/2024 AROM: Right knee: 5-65 degrees, PROM: Right knee: 70 degrees  11/13/2024: Lacking 3 degrees Extension AROM; 78 degrees AAROM Flexion  11/15/2024: lacking 4; 0-82 AA/PROM at end of session  11/18/2024: lacking 2- 80 AAROM  11/20/2024: 5- 95 AAROM  11/22/2024: Lacking 5 degrees AROM Extension; 95 degrees AAROM Flexion   11/25/2024: 98 degrees AAROM Flexion    Treatment     Pat received the treatments listed below:      manual therapy techniques: Joint mobilizations were applied to the: Right knee for 8 minutes,  including:   Patella mobilizations  Scar mobility to improve tissue mobility (educated on performing at home)  Application of size Medium EdemaWear for swelling reduction    neuromuscular re-education activities to improve: Balance, Coordination, Kinesthetic, Sense, Proprioception, and motor control for 49 minutes. The following activities were included:  Patient education: compliance with HEP, use of ice and elevation for swelling reduction, ROM expectations post TKA  Ambulation in clinic with single point cane. Emphasis on heel strike, terminal knee extension, toe off and knee flexion during gait cycle. - NP  Heel prop; 3 minutes for full quad activation - NP  Long sitting HS stretch for full quad activation: 30 seconds, 4 reps - NP  Heel slides; 10 second hold, 5 minutes with therapist assist for normal gait mechanics  NuStep for normal gait mechanics: 10 minutes, Level 1    French NMES: 10 sec on/30 sec off  - Quad sets with towel roll; 10 minutes - mod/max cueing for quad isolation and activation - NP  - Short arc quads with medium bolster; 10 minutes - NP  - Short arc quads with 1/2 foam; 10 minutes    therapeutic activities to improve functional performance for 10 minutes, including:  Sit to stands in std chair + airex: 3 x 10 reps with PT assist - notes pain in Right lateral knee, deferred   Step ups onto 4-inch step: 3 x 10 reps - mod cueing for forward weight distribution during step up for wbing tolerance     cold pack for 8 minutes to Right knee with Chaka LE's elevated on wedge.     Patient Education and Home Exercises     Home Exercises Provided and Patient Education Provided     Education provided:   - compliance with HEP  - plan of care  - prognosis  - s/s DVT  - importance of range of motion for proper gait cycle and functional mobility  - [facility] Phone #: [phone].  - proper use of RW; ambulation of > 200 feet, proper height  - transfers: seated to standing, lateral  transfer, safety    Written Home Exercises Provided: Patient instructed to cont prior HEP. Exercises were reviewed and Pat was able to demonstrate them prior to the end of the session.  Pat demonstrated good  understanding of the education provided. See EMR under Patient Instructions for exercises provided during therapy sessions    ASSESSMENT     Pat is 2 weeks, 6 days s/p Right TKA. She presents ambulating with RW. There is visible swelling observed in Right LE and knee joint. Provided size Medium EdemaWear this session for swelling management. Significant difficulty with sit to stands in standard chair noting increased Right knee joint discomfort, exercise deferred after 5 reps. Continued use of NMES for short arc quads with 1/2 foam to improve quad strength and motor control. Measured 98 degrees AAROM Flexion post session. Will continue per POC towards treatment goals.  Pat Is progressing well towards her goals.   Pt prognosis is Good.     Pt will continue to benefit from skilled outpatient physical therapy to address the deficits listed in the problem list box on initial evaluation, provide pt/family education and to maximize pt's level of independence in the home and community environment.     Pt's spiritual, cultural and educational needs considered and pt agreeable to plan of care and goals.     Anticipated barriers to physical therapy:  chronicity, age    Goals:   Short Term Goals (3 Weeks): - Appropriate, ongoing  1. Patient will be independent with home exercise program to supplement physical therapy treatment in improving functional status.  2.Pt will improve impaired lower extremity manual muscle tests to >/= 4/5 to improve dynamic right knee support for closed chain tasks.  3. Patient will improve (right) knee active range of motion to 0-90 degrees to promote increased ease of sit<>stand transfers.  4. Patient will perform timed up and go with less restrictive assistive device in < 15 seconds to  improve gait speed and safety with community ambulation.     Long Term Goals (6 Weeks): - Appropriate, ongoing  1. Pt will improve impaired lower extremity manual muscle tests to >/= 4+/5 to improve dynamic right knee support for closed chain tasks.  2. Patient will improve the total FOTO Knee Survey Score to </= 40% limited to demonstrate increased perceived functional mobility.  3. Patient will improve score on KOOS Jr. section of FOTO Knee Survey to = </= 70% to demonstrate increased perceived functional mobility.  4. Patient will perform timed up and go with least restrictive assistive device in < 12 seconds to improve gait speed and safety with community ambulation.  5. Patient will perform at least 8 sit to stands in 30 seconds without UE support to demonstrate increased functional strength.   6. Patient will improve (right) knee active range of motion to 0-120 degrees to promote higher level transfers and transitions without limitation.     PLAN     Plan of Care Certification: 11/7/2024 to 12/20/2024.     Outpatient Physical Therapy 3-4 times weekly for 6 weeks to include the following interventions: Aquatic Therapy, Cervical/Lumbar Traction, Electrical Stimulation TENS, Gait Training, Manual Therapy, Moist Heat/ Ice, Neuromuscular Re-ed, Orthotic Management and Training, Patient Education, Self Care, Therapeutic Activities, Therapeutic Exercise, and functional dry needling.     PT/PTA met face to face to discuss patient's treatment plan and progress towards established goals. Patient will be seen by physical therapist every sixth visit and minimally once per month.    Cristy Cordova PTA

## 2024-11-26 ENCOUNTER — CLINICAL SUPPORT (OUTPATIENT)
Dept: REHABILITATION | Facility: HOSPITAL | Age: 74
End: 2024-11-26
Payer: MEDICARE

## 2024-11-26 DIAGNOSIS — M25.661 DECREASED RANGE OF MOTION (ROM) OF RIGHT KNEE: Primary | ICD-10-CM

## 2024-11-26 PROCEDURE — 97530 THERAPEUTIC ACTIVITIES: CPT | Mod: KX,CQ

## 2024-11-26 PROCEDURE — 97014 ELECTRIC STIMULATION THERAPY: CPT | Mod: KX,CQ

## 2024-11-26 PROCEDURE — 97112 NEUROMUSCULAR REEDUCATION: CPT | Mod: KX,CQ

## 2024-11-26 NOTE — PROGRESS NOTES
[facility]   Physical Therapy Treatment Note     Name: Sandra Lui  [facility] Number: [id number]    Therapy Diagnosis:   Encounter Diagnosis   Name Primary?    Decreased range of motion (ROM) of right knee Yes     Physician: Sha Duran MD    Visit Date: 11/26/2024    Physician Orders: PT Eval and Treat   Medical Diagnosis from Referral: M17.11 (ICD-10-CM) - Unilateral primary osteoarthritis, right knee  Evaluation Date: 11/7/2024  Authorization Period Expiration: 12/31/2024  Plan of Care Expiration: 12/20/2024  Progress Note Due: 10th visit  Visit # / Visits authorized: 12 / 25  Episode Visit: 14  FOTO: 2/3    PTA Visit #: 3 / 5     Time In: 0905  Time Out: 1020  Total Treatment Time: 75 minutes  Total Billable Time: 40 minutes (2 NMR, 1 TA, 1 Estim Unattended)    Precautions: Standard, Diabetes    SUBJECTIVE     Patient reports: her knee feels stiff. She was able to tolerate the EdemaWear until 5PM last night and then had to remove it. She has an old thigh high compression garment that she wants to resume wearing if possible.  She was compliant with home exercise program.  Response to previous treatment: appropriate muscle response  Functional change: ongoing; ambulating with RW    Pain: 5/10, currently   Location: Right knee    Patient goals: walk without pain    OBJECTIVE     Objective Measures updated at progress report unless specified.   DOS 11/5/2024  Patient is 3 weeks, 0 days s/p Right TKA as of 11/26/2024.    11/7/2024 AROM: Right knee: 5-65 degrees, PROM: Right knee: 70 degrees  11/13/2024: Lacking 3 degrees Extension AROM; 78 degrees AAROM Flexion  11/15/2024: lacking 4; 0-82 AA/PROM at end of session  11/18/2024: lacking 2- 80 AAROM  11/20/2024: 5- 95 AAROM  11/22/2024: Lacking 5 degrees AROM Extension; 95 degrees AAROM Flexion   11/25/2024: 98 degrees AAROM Flexion  11/26/2024: 2-98 degrees; 100 degrees AAROM Flexion    Treatment     Pat received the treatments  listed below:      manual therapy techniques: Joint mobilizations were applied to the: Right knee for 00 minutes, including:   Patella mobilizations  Scar mobility to improve tissue mobility (educated on performing at home)  Application of size Medium EdemaWear for swelling reduction    neuromuscular re-education activities to improve: Balance, Coordination, Kinesthetic, Sense, Proprioception, and motor control for 55 minutes. The following activities were included:  Patient education: compliance with HEP, use of ice and elevation for swelling reduction, ROM expectations post TKA  Ambulation in clinic with single point cane. Emphasis on heel strike, terminal knee extension, toe off and knee flexion during gait cycle.   Heel prop; 3 minutes for full quad activation - NP  Long sitting HS stretch for full quad activation: 30 seconds, 4 reps - NP  Heel slides; 10 second hold, 5 minutes with therapist assist for normal gait mechanics  NuStep for normal gait mechanics: 10 minutes, Level 1  SAQs with 1/2 foam; 5 second hold, 5 minutes - significant difficulty  LAQs at EOM; 10 second hold, 5 minutes    Cypriot NMES: 10 sec on/30 sec off - NP  - Quad sets with towel roll; 10 minutes - mod/max cueing for quad isolation and activation  - Short arc quads with medium bolster; 10 minutes   - Short arc quads with 1/2 foam; 10 minutes    therapeutic activities to improve functional performance for 12 minutes, including:  Sit to stands in std chair + airex x 2: 2 x 8 reps with PT assist   Step ups onto 4-inch step: 3 x 10 reps - mod cueing for forward weight distribution during step up for wbing tolerance     cold pack for 8 minutes to Right knee with Chaka LE's elevated on wedge.     Patient Education and Home Exercises     Home Exercises Provided and Patient Education Provided     Education provided:   - compliance with HEP  - plan of care  - prognosis  - s/s DVT  - importance of range of motion for proper gait cycle and functional  mobility  - [facility] Phone #: [phone].  - proper use of RW; ambulation of > 200 feet, proper height  - transfers: seated to standing, lateral transfer, safety    Written Home Exercises Provided: Patient instructed to cont prior HEP. Exercises were reviewed and Pat was able to demonstrate them prior to the end of the session.  Pat demonstrated good  understanding of the education provided. See EMR under Patient Instructions for exercises provided during therapy sessions    ASSESSMENT     Pat is 3 weeks, 0 days s/p Right TKA. She presents ambulating with RW. Performed ambulation with single point cane in clinic. There is visible hesitation during ambulation with decreased stance time and step length observed; step to gait pattern visible. Improved tolerance to sit to stands from elevated surface; mod cueing for foot placement for equal weightbearing. She demonstrates significant difficulty with short arc quads on 1/2 foam and would benefit from continuation of NMES. Lacking 2 degrees AROM Extension, 98 degrees AROM Flexion. Measured 100 degrees AAROM Flexion with heel slides. Will continue per POC towards treatment goals.   Pat Is progressing well towards her goals.   Pt prognosis is Good.     Pt will continue to benefit from skilled outpatient physical therapy to address the deficits listed in the problem list box on initial evaluation, provide pt/family education and to maximize pt's level of independence in the home and community environment.     Pt's spiritual, cultural and educational needs considered and pt agreeable to plan of care and goals.     Anticipated barriers to physical therapy:  chronicity, age    Goals:   Short Term Goals (3 Weeks): - Appropriate, ongoing  1. Patient will be independent with home exercise program to supplement physical therapy treatment in improving functional status.  2.Pt will improve impaired lower extremity manual muscle tests to >/= 4/5 to improve  dynamic right knee support for closed chain tasks.  3. Patient will improve (right) knee active range of motion to 0-90 degrees to promote increased ease of sit<>stand transfers.  4. Patient will perform timed up and go with less restrictive assistive device in < 15 seconds to improve gait speed and safety with community ambulation.     Long Term Goals (6 Weeks): - Appropriate, ongoing  1. Pt will improve impaired lower extremity manual muscle tests to >/= 4+/5 to improve dynamic right knee support for closed chain tasks.  2. Patient will improve the total FOTO Knee Survey Score to </= 40% limited to demonstrate increased perceived functional mobility.  3. Patient will improve score on KOOS Jr. section of FOTO Knee Survey to = </= 70% to demonstrate increased perceived functional mobility.  4. Patient will perform timed up and go with least restrictive assistive device in < 12 seconds to improve gait speed and safety with community ambulation.  5. Patient will perform at least 8 sit to stands in 30 seconds without UE support to demonstrate increased functional strength.   6. Patient will improve (right) knee active range of motion to 0-120 degrees to promote higher level transfers and transitions without limitation.     PLAN     Plan of Care Certification: 11/7/2024 to 12/20/2024.     Outpatient Physical Therapy 3-4 times weekly for 6 weeks to include the following interventions: Aquatic Therapy, Cervical/Lumbar Traction, Electrical Stimulation TENS, Gait Training, Manual Therapy, Moist Heat/ Ice, Neuromuscular Re-ed, Orthotic Management and Training, Patient Education, Self Care, Therapeutic Activities, Therapeutic Exercise, and functional dry needling.     PT/PTA met face to face to discuss patient's treatment plan and progress towards established goals. Patient will be seen by physical therapist every sixth visit and minimally once per month.    Cristy Cordova PTA

## 2024-11-27 ENCOUNTER — TELEPHONE (OUTPATIENT)
Dept: ORTHOPEDICS | Facility: CLINIC | Age: 74
End: 2024-11-27
Payer: MEDICARE

## 2024-11-27 ENCOUNTER — CLINICAL SUPPORT (OUTPATIENT)
Dept: REHABILITATION | Facility: HOSPITAL | Age: 74
End: 2024-11-27
Payer: MEDICARE

## 2024-11-27 DIAGNOSIS — M25.661 DECREASED RANGE OF MOTION (ROM) OF RIGHT KNEE: Primary | ICD-10-CM

## 2024-11-27 PROCEDURE — 97112 NEUROMUSCULAR REEDUCATION: CPT | Mod: KX,CQ

## 2024-11-27 NOTE — TELEPHONE ENCOUNTER
Called patient to reschedule 6w p/o appt with Sugar on 12/18 as provider will be OOT. Patient agreed to 12/19 9am appt with José Miguel.

## 2024-11-27 NOTE — PROGRESS NOTES
[facility]   Physical Therapy Treatment Note     Name: Sandra Lui  Clinic Number: [id number]    Therapy Diagnosis:   Encounter Diagnosis   Name Primary?    Decreased range of motion (ROM) of right knee Yes     Physician: Sha Duran MD    Visit Date: 11/27/2024    Physician Orders: PT Eval and Treat   Medical Diagnosis from Referral: M17.11 (ICD-10-CM) - Unilateral primary osteoarthritis, right knee  Evaluation Date: 11/7/2024  Authorization Period Expiration: 12/31/2024  Plan of Care Expiration: 12/20/2024  Progress Note Due: 10th visit  Visit # / Visits authorized: 13 / 25  Episode Visit: 15  FOTO: 2/3    PTA Visit #: 4 / 5     Time In: 0900  Time Out: 1010  Total Treatment Time: 70 minutes  Total Billable Time: 30 minutes (2 NMR)    Precautions: Standard, Diabetes    SUBJECTIVE     Patient reports: her knee feels stiff. She presents today with her compression stocking donned to help with swelling.   She was compliant with home exercise program.  Response to previous treatment: appropriate muscle response  Functional change: ongoing; ambulating with RW    Pain: 1/10, currently   Location: Right knee    Patient goals: walk without pain    OBJECTIVE     Objective Measures updated at progress report unless specified.   DOS 11/5/2024  Patient is 3 weeks, 1 days s/p Right TKA as of 11/27/2024.    11/7/2024 AROM: Right knee: 5-65 degrees, PROM: Right knee: 70 degrees  11/13/2024: Lacking 3 degrees Extension AROM; 78 degrees AAROM Flexion  11/15/2024: lacking 4; 0-82 AA/PROM at end of session  11/18/2024: lacking 2- 80 AAROM  11/20/2024: 5- 95 AAROM  11/22/2024: Lacking 5 degrees AROM Extension; 95 degrees AAROM Flexion   11/25/2024: 98 degrees AAROM Flexion  11/26/2024: 2-98 degrees; 100 degrees AAROM Flexion  11/27/2024: 105 degrees AAROM Flexion    Treatment     Pat received the treatments listed below:      manual therapy techniques: Joint mobilizations were applied to the:  Right knee for 00 minutes, including:   Patella mobilizations  Scar mobility to improve tissue mobility (educated on performing at home)  Application of size Medium EdemaWear for swelling reduction    neuromuscular re-education activities to improve: Balance, Coordination, Kinesthetic, Sense, Proprioception, and motor control for 39 minutes. The following activities were included:  Patient education: compliance with HEP, use of ice and elevation for swelling reduction, ROM expectations post TKA  - Ambulation in clinic with single point cane. Emphasis on heel strike, terminal knee extension, toe off and knee flexion during gait cycle.   - Heel prop; 3 minutes for full quad activation - NP  - Long sitting HS stretch for full quad activation: 30 seconds, 4 reps - NP  - Heel slides; 10 second hold, 5 minutes with therapist assist for normal gait mechanics  - Recumbent Bike for gait mechanics; 5 minutes at half revolutions (seat 15)  - Quad sets; 10 second hold, 10 reps  - SAQs with medium bolster; 5-10 second hold, 5 minutes  - SAQs with 1/2 foam; 5 second hold, 3 minutes - significant difficulty  - LAQs at EOM; 10 second hold, 5 minutes + 2 lb AW    therapeutic activities to improve functional performance for 23 minutes, including:  - Nustep for endogenous release of opioids; 5 minutes, Level 3  - Sit to stands in std chair + airex x 2: 2 x 8 reps with PT assist   - Step ups onto 4-inch step: 3 x 10 reps - mod cueing for forward weight distribution during step up for wbing tolerance     cold pack for 8 minutes to Right knee with Chaka LE's elevated on wedge.     Patient Education and Home Exercises     Home Exercises Provided and Patient Education Provided     Education provided:   - compliance with HEP  - plan of care  - prognosis  - s/s DVT  - importance of range of motion for proper gait cycle and functional mobility  - [facility] Phone #: [phone].  - proper use of RW; ambulation of > 200 feet,  proper height  - transfers: seated to standing, lateral transfer, safety    Written Home Exercises Provided: Patient instructed to cont prior HEP. Exercises were reviewed and Pat was able to demonstrate them prior to the end of the session.  Pat demonstrated good  understanding of the education provided. See EMR under Patient Instructions for exercises provided during therapy sessions    ASSESSMENT     Pat is 3 weeks, 1 days s/p Right TKA. She presents ambulating with RW. Performed ambulation with single point cane in clinic with visible improvement in stride length and gait cycle. She demonstrates difficulty with SAQs on 1/2 foam and achieving terminal knee extension. There is observable ER of Right LE during lifting phase. Added recumbent bike for half revolutions to improve ROM and tissue mobility. Measured 105 degrees AAROM Flexion with heel slides post session. Will continue per POC towards treatment goals.  Pat Is progressing well towards her goals.   Pt prognosis is Good.     Pt will continue to benefit from skilled outpatient physical therapy to address the deficits listed in the problem list box on initial evaluation, provide pt/family education and to maximize pt's level of independence in the home and community environment.     Pt's spiritual, cultural and educational needs considered and pt agreeable to plan of care and goals.     Anticipated barriers to physical therapy:  chronicity, age    Goals:   Short Term Goals (3 Weeks): - Appropriate, ongoing  1. Patient will be independent with home exercise program to supplement physical therapy treatment in improving functional status.  2.Pt will improve impaired lower extremity manual muscle tests to >/= 4/5 to improve dynamic right knee support for closed chain tasks.  3. Patient will improve (right) knee active range of motion to 0-90 degrees to promote increased ease of sit<>stand transfers.  4. Patient will perform timed up and go with less restrictive  assistive device in < 15 seconds to improve gait speed and safety with community ambulation.     Long Term Goals (6 Weeks): - Appropriate, ongoing  1. Pt will improve impaired lower extremity manual muscle tests to >/= 4+/5 to improve dynamic right knee support for closed chain tasks.  2. Patient will improve the total FOTO Knee Survey Score to </= 40% limited to demonstrate increased perceived functional mobility.  3. Patient will improve score on KOOS Jr. section of FOTO Knee Survey to = </= 70% to demonstrate increased perceived functional mobility.  4. Patient will perform timed up and go with least restrictive assistive device in < 12 seconds to improve gait speed and safety with community ambulation.  5. Patient will perform at least 8 sit to stands in 30 seconds without UE support to demonstrate increased functional strength.   6. Patient will improve (right) knee active range of motion to 0-120 degrees to promote higher level transfers and transitions without limitation.     PLAN     Plan of Care Certification: 11/7/2024 to 12/20/2024.     Outpatient Physical Therapy 3-4 times weekly for 6 weeks to include the following interventions: Aquatic Therapy, Cervical/Lumbar Traction, Electrical Stimulation TENS, Gait Training, Manual Therapy, Moist Heat/ Ice, Neuromuscular Re-ed, Orthotic Management and Training, Patient Education, Self Care, Therapeutic Activities, Therapeutic Exercise, and functional dry needling.     PT/PTA met face to face to discuss patient's treatment plan and progress towards established goals. Patient will be seen by physical therapist every sixth visit and minimally once per month.    Cristy Cordova PTA

## 2024-11-28 DIAGNOSIS — Z96.651 S/P TOTAL KNEE REPLACEMENT, RIGHT: ICD-10-CM

## 2024-11-29 RX ORDER — GABAPENTIN 300 MG/1
300 CAPSULE ORAL 2 TIMES DAILY
Qty: 30 CAPSULE | Refills: 0 | Status: SHIPPED | OUTPATIENT
Start: 2024-11-29

## 2024-11-29 RX ORDER — OXYCODONE HYDROCHLORIDE 5 MG/1
TABLET ORAL
Qty: 40 TABLET | Refills: 0 | Status: SHIPPED | OUTPATIENT
Start: 2024-11-29

## 2024-11-29 RX ORDER — METHOCARBAMOL 750 MG/1
750 TABLET, FILM COATED ORAL 4 TIMES DAILY PRN
Qty: 40 TABLET | Refills: 0 | Status: SHIPPED | OUTPATIENT
Start: 2024-11-29 | End: 2024-12-09

## 2024-12-02 ENCOUNTER — CLINICAL SUPPORT (OUTPATIENT)
Dept: REHABILITATION | Facility: HOSPITAL | Age: 74
End: 2024-12-02
Payer: MEDICARE

## 2024-12-02 DIAGNOSIS — M25.661 DECREASED RANGE OF MOTION (ROM) OF RIGHT KNEE: Primary | ICD-10-CM

## 2024-12-02 PROCEDURE — 97530 THERAPEUTIC ACTIVITIES: CPT | Mod: KX,CQ

## 2024-12-02 NOTE — PROGRESS NOTES
[facility] OUTPATIENT THERAPY AND WELLNESS   Physical Therapy Treatment Note     Name: Sandra Lui  [facility] Number: [id number]    Therapy Diagnosis:   Encounter Diagnosis   Name Primary?    Decreased range of motion (ROM) of right knee Yes     Physician: Sha Duran MD    Visit Date: 12/2/2024    Physician Orders: PT Eval and Treat   Medical Diagnosis from Referral: M17.11 (ICD-10-CM) - Unilateral primary osteoarthritis, right knee  Evaluation Date: 11/7/2024  Authorization Period Expiration: 12/31/2024  Plan of Care Expiration: 12/20/2024  Progress Note Due: 10th visit  Visit # / Visits authorized: 14 / 25  Episode Visit: 16  FOTO: 2/3    PTA Visit #: 5 / 5     Time In: 0858  Time Out: 1020  Total Billable Time: 30 minutes (2 TA)    Precautions: Standard, Diabetes    SUBJECTIVE     Patient reports: she swelled up and had increased pain Wednesday night after 3 days in a row of PT. States she had to remove her compression stockings because they were too tight. The swelling took a while to go down with elevation, but finally did. She notes medial calf pain, but states that it feels like a sore muscle.  She was compliant with home exercise program.  Response to previous treatment: appropriate muscle response  Functional change: ongoing; ambulating with RW    Pain: 1/10, currently   Location: Right knee    Patient goals: walk without pain    OBJECTIVE     Objective Measures updated at progress report unless specified.   DOS 11/5/2024  Patient is 3 weeks, 6 days s/p Right TKA as of 12/2/2024.    11/7/2024 AROM: Right knee: 5-65 degrees, PROM: Right knee: 70 degrees  11/13/2024: Lacking 3 degrees Extension AROM; 78 degrees AAROM Flexion  11/15/2024: lacking 4; 0-82 AA/PROM at end of session  11/18/2024: lacking 2- 80 AAROM  11/20/2024: 5- 95 AAROM  11/22/2024: Lacking 5 degrees AROM Extension; 95 degrees AAROM Flexion   11/25/2024: 98 degrees AAROM Flexion  11/26/2024: 2-98 degrees; 100 degrees AAROM  Flexion  11/27/2024: 105 degrees AAROM Flexion  12/2/2024: 105 degrees AAROM Flexion    Treatment     Pat received the treatments listed below:      manual therapy techniques: Joint mobilizations were applied to the: Right knee for 00 minutes, including:   Patella mobilizations  Scar mobility to improve tissue mobility (educated on performing at home)  Application of size Medium EdemaWear for swelling reduction    neuromuscular re-education activities to improve: Balance, Coordination, Kinesthetic, Sense, Proprioception, and motor control for 44 minutes. The following activities were included:  Patient education: compliance with HEP, use of ice and elevation for swelling reduction, ROM expectations post TKA  - Ambulation in clinic with single point cane. Emphasis on heel strike, terminal knee extension, toe off and knee flexion during gait cycle.   - Heel prop; 3 minutes for full quad activation - NP  - Long sitting HS stretch for full quad activation: 30 seconds, 4 reps - NP  - Heel slides; 10 second hold, 5 minutes with therapist assist for normal gait mechanics  - Recumbent Bike for gait mechanics; 5 minutes at half revolutions (seat 15)  - Quad sets; 10 second hold, 10 reps - NP  - SAQs with medium bolster; 5-10 second hold, 5 minutes + 3 lb AW  - SAQs with 1/2 foam; 5 second hold, 3 minutes - significant difficulty  - LAQs at EOM; 10 second hold, 5 minutes + 3 lb AW    therapeutic activities to improve functional performance for 30 minutes, including:  - Nustep for endogenous release of opioids; 5 minutes, Level 3  - Sit to stands in std chair + airex x 2: 3 x 8 reps with PT assist   - Step ups onto 6-inch step: 3 x 10 reps - mod cueing for forward weight distribution during step up for wbing tolerance  - Mini squats in // bars; 5 second hold, 20 reps     cold pack for 8 minutes to Right knee with Chaka LE's elevated on wedge.     Patient Education and Home Exercises     Home Exercises Provided and Patient  Education Provided     Education provided:   - compliance with HEP  - plan of care  - prognosis  - s/s DVT  - importance of range of motion for proper gait cycle and functional mobility  - [facility] Phone #: [phone].  - proper use of RW; ambulation of > 200 feet, proper height  - transfers: seated to standing, lateral transfer, safety    Written Home Exercises Provided: Patient instructed to cont prior HEP. Exercises were reviewed and Pat was able to demonstrate them prior to the end of the session.  Pat demonstrated good  understanding of the education provided. See EMR under Patient Instructions for exercises provided during therapy sessions    ASSESSMENT     Pat is 3 weeks, 6 days s/p Right TKA. Overall good tolerance to therapeutic interventions noting adequate muscle response throughout. Addition of mini squats to improve weight bearing tolerance in Right LE. Measured 105 degrees AAROM Flexion. She notes TTP along  Right calf, however notes more muscle soreness than pain. There is swelling present, but visible improvements compared to previous sessions. Education provided on s/s of a DVT and contacting MD if symptoms worsen, she verbalized understanding. Will continue per POC towards treatment goals.  Pat Is progressing well towards her goals.   Pt prognosis is Good.     Pt will continue to benefit from skilled outpatient physical therapy to address the deficits listed in the problem list box on initial evaluation, provide pt/family education and to maximize pt's level of independence in the home and community environment.     Pt's spiritual, cultural and educational needs considered and pt agreeable to plan of care and goals.     Anticipated barriers to physical therapy:  chronicity, age    Goals:   Short Term Goals (3 Weeks): - Appropriate, ongoing  1. Patient will be independent with home exercise program to supplement physical therapy treatment in improving functional status.  2.Pt  will improve impaired lower extremity manual muscle tests to >/= 4/5 to improve dynamic right knee support for closed chain tasks.  3. Patient will improve (right) knee active range of motion to 0-90 degrees to promote increased ease of sit<>stand transfers.  4. Patient will perform timed up and go with less restrictive assistive device in < 15 seconds to improve gait speed and safety with community ambulation.     Long Term Goals (6 Weeks): - Appropriate, ongoing  1. Pt will improve impaired lower extremity manual muscle tests to >/= 4+/5 to improve dynamic right knee support for closed chain tasks.  2. Patient will improve the total FOTO Knee Survey Score to </= 40% limited to demonstrate increased perceived functional mobility.  3. Patient will improve score on KOOS Jr. section of FOTO Knee Survey to = </= 70% to demonstrate increased perceived functional mobility.  4. Patient will perform timed up and go with least restrictive assistive device in < 12 seconds to improve gait speed and safety with community ambulation.  5. Patient will perform at least 8 sit to stands in 30 seconds without UE support to demonstrate increased functional strength.   6. Patient will improve (right) knee active range of motion to 0-120 degrees to promote higher level transfers and transitions without limitation.     PLAN     Plan of Care Certification: 11/7/2024 to 12/20/2024.     Outpatient Physical Therapy 3-4 times weekly for 6 weeks to include the following interventions: Aquatic Therapy, Cervical/Lumbar Traction, Electrical Stimulation TENS, Gait Training, Manual Therapy, Moist Heat/ Ice, Neuromuscular Re-ed, Orthotic Management and Training, Patient Education, Self Care, Therapeutic Activities, Therapeutic Exercise, and functional dry needling.     PT/PTA met face to face to discuss patient's treatment plan and progress towards established goals. Patient will be seen by physical therapist every sixth visit and minimally once  per month.    Cristy Cordova, PTA

## 2024-12-04 ENCOUNTER — HOSPITAL ENCOUNTER (OUTPATIENT)
Dept: RADIOLOGY | Facility: HOSPITAL | Age: 74
Discharge: HOME | End: 2024-12-04
Attending: ORTHOPAEDIC SURGERY
Payer: MEDICARE

## 2024-12-04 ENCOUNTER — CLINICAL SUPPORT (OUTPATIENT)
Dept: REHABILITATION | Facility: HOSPITAL | Age: 74
End: 2024-12-04
Payer: MEDICARE

## 2024-12-04 DIAGNOSIS — R60.0 LOCALIZED EDEMA: ICD-10-CM

## 2024-12-04 DIAGNOSIS — M25.661 DECREASED RANGE OF MOTION (ROM) OF RIGHT KNEE: Primary | ICD-10-CM

## 2024-12-04 DIAGNOSIS — Z96.651 S/P TOTAL KNEE REPLACEMENT, RIGHT: ICD-10-CM

## 2024-12-04 DIAGNOSIS — Z96.651 S/P TOTAL KNEE REPLACEMENT, RIGHT: Primary | ICD-10-CM

## 2024-12-04 PROCEDURE — 97530 THERAPEUTIC ACTIVITIES: CPT

## 2024-12-04 PROCEDURE — 93971 EXTREMITY STUDY: CPT | Mod: TC,RT

## 2024-12-04 PROCEDURE — 93971 EXTREMITY STUDY: CPT | Mod: 26,RT,, | Performed by: RADIOLOGY

## 2024-12-04 NOTE — PROGRESS NOTES
[facility]   Physical Therapy Treatment Note     Name: Sandra Lui  Clinic Number: [id number]    Therapy Diagnosis:   Encounter Diagnosis   Name Primary?    Decreased range of motion (ROM) of right knee Yes     Physician: Sha Duran MD    Visit Date: 12/4/2024    Physician Orders: PT Eval and Treat   Medical Diagnosis from Referral: M17.11 (ICD-10-CM) - Unilateral primary osteoarthritis, right knee  Evaluation Date: 11/7/2024  Authorization Period Expiration: 12/31/2024  Plan of Care Expiration: 12/20/2024  Progress Note Due: 10th visit  Visit # / Visits authorized: 15 / 25  Episode Visit: 16  FOTO: 2/3    PTA Visit #: 0 / 5     Time In: 9:00 am  Time Out: 10:34 pm  Total time: 84 minutes  Total Billable Time: 30 minutes (2 TA)    Precautions: Standard, Diabetes    SUBJECTIVE     Patient reports: her swelling came back and she needed to loosen her R shoe to fit her foot.   She was compliant with home exercise program.  Response to previous treatment: appropriate muscle response  Functional change: ongoing; ambulating with RW    Pain: 1/10, currently   Location: Right knee    Patient goals: walk without pain    OBJECTIVE     Objective Measures updated at progress report unless specified.   DOS 11/5/2024  Patient is 3 weeks, 6 days s/p Right TKA as of 12/2/2024.    11/7/2024 AROM: Right knee: 5-65 degrees, PROM: Right knee: 70 degrees  11/13/2024: Lacking 3 degrees Extension AROM; 78 degrees AAROM Flexion  11/15/2024: lacking 4; 0-82 AA/PROM at end of session  11/18/2024: lacking 2- 80 AAROM  11/20/2024: 5- 95 AAROM  11/22/2024: Lacking 5 degrees AROM Extension; 95 degrees AAROM Flexion   11/25/2024: 98 degrees AAROM Flexion  11/26/2024: 2-98 degrees; 100 degrees AAROM Flexion  11/27/2024: 105 degrees AAROM Flexion  12/2/2024: 105 degrees AAROM Flexion    12/4/2024: 101 AROM, 104 AAROM      Treatment     Pat received the treatments listed below:      manual therapy techniques:  Joint mobilizations were applied to the: Right knee for 00 minutes, including:   Patella mobilizations  Scar mobility to improve tissue mobility (educated on performing at home)  Application of size Medium EdemaWear for swelling reduction    neuromuscular re-education activities to improve: Balance, Coordination, Kinesthetic, Sense, Proprioception, and motor control for 39 minutes. The following activities were included:  Patient education: compliance with HEP, use of ice and elevation for swelling reduction, ROM expectations post TKA  - Ambulation in clinic with single point cane. Emphasis on heel strike, terminal knee extension, toe off and knee flexion during gait cycle.   - standing gastroc inhibition for full quad activation: 30 seconds, 4 reps   - Heel slides; 10 second hold, 5 minutes with therapist assist for normal gait mechanics  - Recumbent Bike for gait mechanics; 10 minutes at FULL revolutions (seat 15)  - SAQs with medium bolster; 5-10 second hold, 5 minutes + 4 lb AW  - SAQs with 1/2 foam; 5 second hold, 3 minutes - significant difficulty  - LAQs at EOM; 10 second hold, 5 minutes + 4 lb AW    therapeutic activities to improve functional performance for 45 minutes, including:  - Nustep for endogenous release of opioids; 5 minutes, Level 3  - Sit to stands in std chair + airex  4 x 8 reps  - Step ups onto 6-inch step: 3 x 10 reps - mod cueing for forward weight distribution during step up for wbing tolerance  Lateral step ups 6 inch step  - Mini squats in // bars; 5 second hold, 20 reps - held; resume next visit    cold pack for 8 minutes to Right knee with Chaka LE's elevated on wedge.     Patient Education and Home Exercises     Home Exercises Provided and Patient Education Provided     Education provided:   - compliance with HEP  - plan of care  - prognosis  - s/s DVT  - importance of range of motion for proper gait cycle and functional mobility  - [facility] Phone #: [phone].  -  proper use of RW; ambulation of > 200 feet, proper height  - transfers: seated to standing, lateral transfer, safety    Written Home Exercises Provided: Patient instructed to cont prior HEP. Exercises were reviewed and Pat was able to demonstrate them prior to the end of the session.  Pat demonstrated good  understanding of the education provided. See EMR under Patient Instructions for exercises provided during therapy sessions    ASSESSMENT     Pat is 4 weeks, 1 day s/p Right TKA.Able to perform full revolutions on recumbent bike. Demonstrates 0-104 degrees of AAROM at this time. Continued to struggle with sit to stands unless at least 1 airex pad present for elevated seat height. Heavy cues for quad engagement during lateral step ups.      Pat Is progressing well towards her goals.   Pt prognosis is Good.     Pt will continue to benefit from skilled outpatient physical therapy to address the deficits listed in the problem list box on initial evaluation, provide pt/family education and to maximize pt's level of independence in the home and community environment.     Pt's spiritual, cultural and educational needs considered and pt agreeable to plan of care and goals.     Anticipated barriers to physical therapy:  chronicity, age    Goals:   Short Term Goals (3 Weeks): - Appropriate, ongoing  1. Patient will be independent with home exercise program to supplement physical therapy treatment in improving functional status.  2.Pt will improve impaired lower extremity manual muscle tests to >/= 4/5 to improve dynamic right knee support for closed chain tasks.  3. Patient will improve (right) knee active range of motion to 0-90 degrees to promote increased ease of sit<>stand transfers.  4. Patient will perform timed up and go with less restrictive assistive device in < 15 seconds to improve gait speed and safety with community ambulation.     Long Term Goals (6 Weeks): - Appropriate, ongoing  1. Pt will improve impaired  lower extremity manual muscle tests to >/= 4+/5 to improve dynamic right knee support for closed chain tasks.  2. Patient will improve the total FOTO Knee Survey Score to </= 40% limited to demonstrate increased perceived functional mobility.  3. Patient will improve score on KOOS Jr. section of FOTO Knee Survey to = </= 70% to demonstrate increased perceived functional mobility.  4. Patient will perform timed up and go with least restrictive assistive device in < 12 seconds to improve gait speed and safety with community ambulation.  5. Patient will perform at least 8 sit to stands in 30 seconds without UE support to demonstrate increased functional strength.   6. Patient will improve (right) knee active range of motion to 0-120 degrees to promote higher level transfers and transitions without limitation.     PLAN     Plan of Care Certification: 11/7/2024 to 12/20/2024.     Outpatient Physical Therapy 3-4 times weekly for 6 weeks to include the following interventions: Aquatic Therapy, Cervical/Lumbar Traction, Electrical Stimulation TENS, Gait Training, Manual Therapy, Moist Heat/ Ice, Neuromuscular Re-ed, Orthotic Management and Training, Patient Education, Self Care, Therapeutic Activities, Therapeutic Exercise, and functional dry needling.     PT/PTA met face to face to discuss patient's treatment plan and progress towards established goals. Patient will be seen by physical therapist every sixth visit and minimally once per month.    Yessi Echevarria, PT, DPT, OCS

## 2024-12-06 ENCOUNTER — CLINICAL SUPPORT (OUTPATIENT)
Dept: REHABILITATION | Facility: HOSPITAL | Age: 74
End: 2024-12-06
Payer: MEDICARE

## 2024-12-06 DIAGNOSIS — M25.661 DECREASED RANGE OF MOTION (ROM) OF RIGHT KNEE: Primary | ICD-10-CM

## 2024-12-06 PROCEDURE — 97530 THERAPEUTIC ACTIVITIES: CPT | Mod: KX,CQ

## 2024-12-06 PROCEDURE — 97112 NEUROMUSCULAR REEDUCATION: CPT | Mod: KX,CQ

## 2024-12-06 NOTE — PROGRESS NOTES
"[facility]ER[facility]   Physical Therapy Treatment Note     Name: Sandra Lui  Clinic Number: [id number]    Therapy Diagnosis:   Encounter Diagnosis   Name Primary?    Decreased range of motion (ROM) of right knee Yes     Physician: Sha Duran MD    Visit Date: 12/6/2024    Physician Orders: PT Eval and Treat   Medical Diagnosis from Referral: M17.11 (ICD-10-CM) - Unilateral primary osteoarthritis, right knee  Evaluation Date: 11/7/2024  Authorization Period Expiration: 12/31/2024  Plan of Care Expiration: 12/20/2024  Progress Note Due: 10th visit  Visit # / Visits authorized: 16 / 25  Episode Visit: 18   FOTO: 2/3    PTA Visit #: 1 / 5     Time In: 0848  Time Out: 1015  Total Billable Time: 42 minutes (2 TA, 1 NMR)    Precautions: Standard, Diabetes    SUBJECTIVE     Patient reports: she had the ultrasound done, but has not heard about the results. She did read them online and knows she does not have a blood clot. She was told she should have continued taking her blood thinners post surgery, but she has not been because she did not know. States that she rested all day yesterday and elevated her leg. She notes the swelling did go down in her ankle, but her knee feels stiff and swollen today.   She was compliant with home exercise program.  Response to previous treatment: appropriate muscle response  Functional change: ongoing; ambulating with SPC    Pain: 6-7/10, currently "stiff"  Location: Right knee    Patient goals: walk without pain    OBJECTIVE     Objective Measures updated at progress report unless specified.   DOS 11/5/2024  Patient is 4 weeks, 3 days s/p Right TKA as of 12/6/2024.    11/7/2024 AROM: Right knee: 5-65 degrees, PROM: Right knee: 70 degrees  11/13/2024: Lacking 3 degrees Extension AROM; 78 degrees AAROM Flexion  11/15/2024: lacking 4; 0-82 AA/PROM at end of session  11/18/2024: lacking 2- 80 AAROM  11/20/2024: 5- 95 AAROM  11/22/2024: Lacking 5 degrees AROM " Extension; 95 degrees AAROM Flexion   11/25/2024: 98 degrees AAROM Flexion  11/26/2024: 2-98 degrees; 100 degrees AAROM Flexion  11/27/2024: 105 degrees AAROM Flexion  12/2/2024: 105 degrees AAROM Flexion  12/4/2024: 101 AROM, 104 AAROM  12/6/2024: 101 degrees AAROM    Treatment     Pat received the treatments listed below:      manual therapy techniques: Joint mobilizations were applied to the: Right knee for 8 minutes, including:   Patella mobilizations - tender in medial knee  Gentle effleurage to Right knee for swelling reduction  Scar mobility to improve tissue mobility (educated on performing at home)    neuromuscular re-education activities to improve: Balance, Coordination, Kinesthetic, Sense, Proprioception, and motor control for 36 minutes. The following activities were included:  Patient education: compliance with HEP, use of ice and elevation for swelling reduction, ROM expectations post TKA  - Ambulation in clinic with single point cane. Emphasis on heel strike, terminal knee extension, toe off and knee flexion during gait cycle.   - Standing gastroc inhibition for full quad activation: 30 seconds, 4 reps   - Heel slides; 10 second hold, 5 minutes with therapist assist for normal gait mechanics  - Recumbent Bike for gait mechanics; 5 minutes, HALF revolutions (seat 17)  - Quad sets with towel roll; 5-10 second hold, 3 minutes + cold pack for pain reduction  - SAQs with medium bolster; 5-10 second hold, 3 minutes + cold pack for pain reduction (held 4 lb AW)   *requires use of strap for lifting assist secondary to increased medial knee pain  - SAQs with 1/2 foam; 5 second hold, 3 minutes - significant difficulty - HELD  - LAQs at EOM; 10 second hold, 5 minutes + 4 lb AW - HELD    therapeutic activities to improve functional performance for 35 minutes, including:  - Nustep for endogenous release of opioids; 5 minutes, Level 3  - Sit to stands in std chair + airex: 2 x 8 reps - significant difficulty  -  Step ups onto 6-inch step: 3 x 10 reps - mod cueing for forward weight distribution during step up for wbing tolerance  - Lateral step ups onto 6-inch step; 3 x 10 reps  - Mini squats in // bars; 5 second hold, 2 x 10 reps    cold pack for 8 minutes to Right knee with Chaka LE's elevated on wedge.     Patient Education and Home Exercises     Home Exercises Provided and Patient Education Provided     Education provided:   - compliance with HEP  - plan of care  - prognosis  - s/s DVT  - importance of range of motion for proper gait cycle and functional mobility  - [facility] Phone #: [phone].  - proper use of RW; ambulation of > 200 feet, proper height  - transfers: seated to standing, lateral transfer, safety    Written Home Exercises Provided: Patient instructed to cont prior HEP. Exercises were reviewed and Pat was able to demonstrate them prior to the end of the session.  Pat demonstrated good  understanding of the education provided. See EMR under Patient Instructions for exercises provided during therapy sessions    ASSESSMENT     Pat is 4 weeks, 3 days s/p Right TKA. She presents ambulating with single point cane. Poor tolerance to sit to stands in standard chair noting increased Right knee pain; exercise deferred. She reports significant difficulty with short arc quads requiring use of strap for lifting assist. Performed effleurage massage on Right knee along with use of ice and elevation to help with pain and swelling reduction. Measured 101 degrees AAROM post session. Discussed heightened elevation at home as well as RICE to help reduce swelling and pain. Will continue per POC towards treatment goals.  Pat Is progressing well towards her goals.   Pt prognosis is Good.     Pt will continue to benefit from skilled outpatient physical therapy to address the deficits listed in the problem list box on initial evaluation, provide pt/family education and to maximize pt's level of independence  in the home and community environment.     Pt's spiritual, cultural and educational needs considered and pt agreeable to plan of care and goals.     Anticipated barriers to physical therapy:  chronicity, age    Goals:   Short Term Goals (3 Weeks): - Appropriate, ongoing  1. Patient will be independent with home exercise program to supplement physical therapy treatment in improving functional status.  2.Pt will improve impaired lower extremity manual muscle tests to >/= 4/5 to improve dynamic right knee support for closed chain tasks.  3. Patient will improve (right) knee active range of motion to 0-90 degrees to promote increased ease of sit<>stand transfers.  4. Patient will perform timed up and go with less restrictive assistive device in < 15 seconds to improve gait speed and safety with community ambulation.     Long Term Goals (6 Weeks): - Appropriate, ongoing  1. Pt will improve impaired lower extremity manual muscle tests to >/= 4+/5 to improve dynamic right knee support for closed chain tasks.  2. Patient will improve the total FOTO Knee Survey Score to </= 40% limited to demonstrate increased perceived functional mobility.  3. Patient will improve score on KOOS Jr. section of FOTO Knee Survey to = </= 70% to demonstrate increased perceived functional mobility.  4. Patient will perform timed up and go with least restrictive assistive device in < 12 seconds to improve gait speed and safety with community ambulation.  5. Patient will perform at least 8 sit to stands in 30 seconds without UE support to demonstrate increased functional strength.   6. Patient will improve (right) knee active range of motion to 0-120 degrees to promote higher level transfers and transitions without limitation.     PLAN     Plan of Care Certification: 11/7/2024 to 12/20/2024.     Outpatient Physical Therapy 3-4 times weekly for 6 weeks to include the following interventions: Aquatic Therapy, Cervical/Lumbar Traction, Electrical  Stimulation TENS, Gait Training, Manual Therapy, Moist Heat/ Ice, Neuromuscular Re-ed, Orthotic Management and Training, Patient Education, Self Care, Therapeutic Activities, Therapeutic Exercise, and functional dry needling.     PT/PTA met face to face to discuss patient's treatment plan and progress towards established goals. Patient will be seen by physical therapist every sixth visit and minimally once per month.    Cristy Cordova PTA

## 2024-12-09 ENCOUNTER — TELEPHONE (OUTPATIENT)
Dept: PAIN MEDICINE | Facility: CLINIC | Age: 74
End: 2024-12-09
Payer: MEDICARE

## 2024-12-09 ENCOUNTER — CLINICAL SUPPORT (OUTPATIENT)
Dept: REHABILITATION | Facility: HOSPITAL | Age: 74
End: 2024-12-09
Payer: MEDICARE

## 2024-12-09 DIAGNOSIS — M25.661 DECREASED RANGE OF MOTION (ROM) OF RIGHT KNEE: Primary | ICD-10-CM

## 2024-12-09 PROCEDURE — 97530 THERAPEUTIC ACTIVITIES: CPT | Mod: KX,CQ

## 2024-12-09 NOTE — PROGRESS NOTES
"[facility]   Physical Therapy Treatment Note     Name: Sandra Lui  Clinic Number: [id number]    Therapy Diagnosis:   Encounter Diagnosis   Name Primary?    Decreased range of motion (ROM) of right knee Yes     Physician: Sha Duran MD    Visit Date: 12/9/2024    Physician Orders: PT Eval and Treat   Medical Diagnosis from Referral: M17.11 (ICD-10-CM) - Unilateral primary osteoarthritis, right knee  Evaluation Date: 11/7/2024  Authorization Period Expiration: 12/31/2024  Plan of Care Expiration: 12/20/2024  Progress Note Due: 10th visit  Visit # / Visits authorized: 17 / 25  Episode Visit: 19  FOTO: 2/3    PTA Visit #: 2 / 5     Time In: 0850  Time Out: 1011  Total Billable Time: 30 minutes (2 TA)    Precautions: Standard, Diabetes    SUBJECTIVE     Patient reports: she had a misstep on Friday coming into PT and almost fell; reports this happened when getting out of the chair in the waiting room. She reports not saying anything at Friday's PT appointment because she forgot. States that she has been swollen and sore since. Spent most of the weekend elevated and icing. She started taking her fluid pill again, which she feels like has really helped.   She was compliant with home exercise program.  Response to previous treatment: appropriate muscle response  Functional change: ongoing; ambulating with SPC    Pain: 6/10, currently "sore"  Location: Right knee    Patient goals: walk without pain    OBJECTIVE     Objective Measures updated at progress report unless specified.   DOS 11/5/2024  Patient is 4 weeks, 6 days s/p Right TKA as of 12/9/2024.    11/7/2024 AROM: Right knee: 5-65 degrees, PROM: Right knee: 70 degrees  11/13/2024: Lacking 3 degrees Extension AROM; 78 degrees AAROM Flexion  11/15/2024: lacking 4; 0-82 AA/PROM at end of session  11/18/2024: lacking 2- 80 AAROM  11/20/2024: 5- 95 AAROM  11/22/2024: Lacking 5 degrees AROM Extension; 95 degrees AAROM Flexion "   11/25/2024: 98 degrees AAROM Flexion  11/26/2024: 2-98 degrees; 100 degrees AAROM Flexion  11/27/2024: 105 degrees AAROM Flexion  12/2/2024: 105 degrees AAROM Flexion  12/4/2024: 101 AROM, 104 AAROM  12/6/2024: 101 degrees AAROM  12/9/2024: 109 degrees AAROM    Treatment     Pat received the treatments listed below:      manual therapy techniques: Joint mobilizations were applied to the: Right knee for 00 minutes, including:   Patella mobilizations - tender in medial knee  Gentle effleurage to Right knee for swelling reduction  Scar mobility to improve tissue mobility (educated on performing at home)    neuromuscular re-education activities to improve: Balance, Coordination, Kinesthetic, Sense, Proprioception, and motor control for 51 minutes. The following activities were included:  Patient education: compliance with HEP, use of ice and elevation for swelling reduction, ROM expectations post TKA  - Ambulation in clinic with single point cane. Emphasis on heel strike, terminal knee extension, toe off and knee flexion during gait cycle.   - Standing gastroc inhibition for full quad activation: 30 seconds, 4 reps   - Heel slides; 10 second hold, 5 minutes with therapist assist for normal gait mechanics  - Recumbent Bike for gait mechanics; 5 minutes, HALF revolutions (seat 16)  - Quad sets; 10 second hold, 3 minutes  - SAQs with medium bolster; 5 second hold, 5 minutes (held 4 lb AW)  - SAQs with 1/2 foam; 5 second hold, 3 minutes - significant difficulty - HELD  - LAQs at EOM; 10 second hold, 5 minutes (held 4 lb AW)    therapeutic activities to improve functional performance for 30 minutes, including:  - Nustep for endogenous release of opioids; 5 minutes, Level 3 - held  - Sit to stands in std chair + 2 airex: 1 x 10 reps; + 1 airex: 2 x 10 reps  - Step ups onto 6-inch step: 3 x 10 reps - mod cueing for forward weight distribution during step up for wbing tolerance  - Lateral step ups onto 6-inch step; 3 x 10  reps  - Mini squats in // bars; 5 second hold, 2 x 10 reps - held    cold pack for 00 minutes to Right knee with Chaka LE's elevated on wedge. (Not today. Patient declined.)    Patient Education and Home Exercises     Home Exercises Provided and Patient Education Provided     Education provided:   - compliance with HEP  - plan of care  - prognosis  - s/s DVT  - importance of range of motion for proper gait cycle and functional mobility  - [facility] Phone #: [phone].  - proper use of RW; ambulation of > 200 feet, proper height  - transfers: seated to standing, lateral transfer, safety    Written Home Exercises Provided: Patient instructed to cont prior HEP. Exercises were reviewed and Pat was able to demonstrate them prior to the end of the session.  Pat demonstrated good  understanding of the education provided. See EMR under Patient Instructions for exercises provided during therapy sessions    ASSESSMENT     Pat is 4 weeks, 6 days s/p Right TKA. She presents ambulating with single point cane. She notes significant difficulty throughout session with interventions requiring encouragement to achieve full potential. Measured 109 degrees AAROM post session. Plan to resume full revolutions on recumbent bike and ankle weight with SAQs and LAQs next session. Will continue per POC towards treatment goals.  Pat Is progressing well towards her goals.   Pt prognosis is Good.     Pt will continue to benefit from skilled outpatient physical therapy to address the deficits listed in the problem list box on initial evaluation, provide pt/family education and to maximize pt's level of independence in the home and community environment.     Pt's spiritual, cultural and educational needs considered and pt agreeable to plan of care and goals.     Anticipated barriers to physical therapy:  chronicity, age    Goals:   Short Term Goals (3 Weeks): - Appropriate, ongoing  1. Patient will be independent with home  exercise program to supplement physical therapy treatment in improving functional status.  2.Pt will improve impaired lower extremity manual muscle tests to >/= 4/5 to improve dynamic right knee support for closed chain tasks.  3. Patient will improve (right) knee active range of motion to 0-90 degrees to promote increased ease of sit<>stand transfers.  4. Patient will perform timed up and go with less restrictive assistive device in < 15 seconds to improve gait speed and safety with community ambulation.     Long Term Goals (6 Weeks): - Appropriate, ongoing  1. Pt will improve impaired lower extremity manual muscle tests to >/= 4+/5 to improve dynamic right knee support for closed chain tasks.  2. Patient will improve the total FOTO Knee Survey Score to </= 40% limited to demonstrate increased perceived functional mobility.  3. Patient will improve score on KOOS Jr. section of FOTO Knee Survey to = </= 70% to demonstrate increased perceived functional mobility.  4. Patient will perform timed up and go with least restrictive assistive device in < 12 seconds to improve gait speed and safety with community ambulation.  5. Patient will perform at least 8 sit to stands in 30 seconds without UE support to demonstrate increased functional strength.   6. Patient will improve (right) knee active range of motion to 0-120 degrees to promote higher level transfers and transitions without limitation.     PLAN     Plan of Care Certification: 11/7/2024 to 12/20/2024.     Outpatient Physical Therapy 3-4 times weekly for 6 weeks to include the following interventions: Aquatic Therapy, Cervical/Lumbar Traction, Electrical Stimulation TENS, Gait Training, Manual Therapy, Moist Heat/ Ice, Neuromuscular Re-ed, Orthotic Management and Training, Patient Education, Self Care, Therapeutic Activities, Therapeutic Exercise, and functional dry needling.     PT/PTA met face to face to discuss patient's treatment plan and progress towards  established goals. Patient will be seen by physical therapist every sixth visit and minimally once per month.    Cristy Cordova, PTA

## 2024-12-11 ENCOUNTER — CLINICAL SUPPORT (OUTPATIENT)
Dept: REHABILITATION | Facility: HOSPITAL | Age: 74
End: 2024-12-11
Payer: MEDICARE

## 2024-12-11 DIAGNOSIS — M25.661 DECREASED RANGE OF MOTION (ROM) OF RIGHT KNEE: Primary | ICD-10-CM

## 2024-12-11 PROCEDURE — 97530 THERAPEUTIC ACTIVITIES: CPT

## 2024-12-11 NOTE — PROGRESS NOTES
"[facility]   Physical Therapy Treatment Note     Name: Sandra Lui  Clinic Number: [id number]    Therapy Diagnosis:   Encounter Diagnosis   Name Primary?    Decreased range of motion (ROM) of right knee Yes       Physician: Sha Duran MD    Visit Date: 12/11/2024    Physician Orders: PT Eval and Treat   Medical Diagnosis from Referral: M17.11 (ICD-10-CM) - Unilateral primary osteoarthritis, right knee  Evaluation Date: 11/7/2024  Authorization Period Expiration: 12/31/2024  Plan of Care Expiration: 12/20/2024  Progress Note Due: 10th visit  Visit # / Visits authorized: 17 / 25  Episode Visit: 19  FOTO: 2/3    PTA Visit #: 0 / 5     Time In: 9:00 am  Time Out: 10:00 am  Total Billable Time: 30 minutes (2 TA)    Precautions: Standard, Diabetes    SUBJECTIVE     Patient reports: doing better.  She was compliant with home exercise program.  Response to previous treatment: appropriate muscle response  Functional change: ongoing; ambulating with SPC    Pain: 6/10, currently "sore"  Location: Right knee    Patient goals: walk without pain    OBJECTIVE     Objective Measures updated at progress report unless specified.   DOS 11/5/2024  Patient is 4 weeks, 6 days s/p Right TKA as of 12/9/2024.    11/7/2024 AROM: Right knee: 5-65 degrees, PROM: Right knee: 70 degrees  11/13/2024: Lacking 3 degrees Extension AROM; 78 degrees AAROM Flexion  11/15/2024: lacking 4; 0-82 AA/PROM at end of session  11/18/2024: lacking 2- 80 AAROM  11/20/2024: 5- 95 AAROM  11/22/2024: Lacking 5 degrees AROM Extension; 95 degrees AAROM Flexion   11/25/2024: 98 degrees AAROM Flexion  11/26/2024: 2-98 degrees; 100 degrees AAROM Flexion  11/27/2024: 105 degrees AAROM Flexion  12/2/2024: 105 degrees AAROM Flexion  12/4/2024: 101 AROM, 104 AAROM  12/6/2024: 101 degrees AAROM  12/9/2024: 109 degrees AAROM  12/11/2024: 1-110 AAROM    Treatment     Pat received the treatments listed below:      manual therapy " techniques: Joint mobilizations were applied to the: Right knee for 00 minutes, including:   Patella mobilizations - tender in medial knee  Gentle effleurage to Right knee for swelling reduction  Scar mobility to improve tissue mobility (educated on performing at home)    neuromuscular re-education activities to improve: Balance, Coordination, Kinesthetic, Sense, Proprioception, and motor control for 30minutes. The following activities were included:  Patient education: compliance with HEP, use of ice and elevation for swelling reduction, ROM expectations post TKA  - Ambulation in clinic with single point cane. Emphasis on heel strike, terminal knee extension, toe off and knee flexion during gait cycle.   - Heel slides; 10 second hold, 5 minutes with therapist assist for normal gait mechanics  - Recumbent Bike for gait mechanics; 5 minutes, HALF revolutions (seat 16)  - SAQs with medium bolster; 10 seconds, 30x 4 lbs  - SAQs with 1/2 foam; 5 second hold, 3 minutes - significant difficulty - HELD  - LAQs at EOM; 10 second hold, 30x 4 lbs  -Standing TKE blue: 10 seconds 2x15    therapeutic activities to improve functional performance for 30 minutes, including:  - Nustep for endogenous release of opioids; 5 minutes, Level 3 - held  - Sit to stands in std chair + 2 airex: 1 x 10 reps; + 1 airex: 2 x 10 reps  - Step ups onto 6-inch step: 3 x 10 reps - mod cueing for forward weight distribution during step up for wbing tolerance  - Lateral step ups onto 6-inch step; 3 x 10 reps  - Mini squats in // bars; 5 second hold, 2 x 10 reps - held    cold pack for 00 minutes to Right knee with Chaka LE's elevated on wedge. (Not today. Patient declined.)    Patient Education and Home Exercises     Home Exercises Provided and Patient Education Provided     Education provided:   - compliance with HEP  - plan of care  - prognosis  - s/s DVT  - importance of range of motion for proper gait cycle and functional mobility  - [facility]  [facility]gent Care Line Phone #: [phone].  - proper use of RW; ambulation of > 200 feet, proper height  - transfers: seated to standing, lateral transfer, safety    Written Home Exercises Provided: Patient instructed to cont prior HEP. Exercises were reviewed and Pat was able to demonstrate them prior to the end of the session.  Pat demonstrated good  understanding of the education provided. See EMR under Patient Instructions for exercises provided during therapy sessions    ASSESSMENT     Pat is 5 weeks  s/p Right TKA. Cues to increase tempo for step ups and sit to stands. Demonstrates 1-110 degrees AAROM at this time.        Pat Is progressing well towards her goals.   Pt prognosis is Good.     Pt will continue to benefit from skilled outpatient physical therapy to address the deficits listed in the problem list box on initial evaluation, provide pt/family education and to maximize pt's level of independence in the home and community environment.     Pt's spiritual, cultural and educational needs considered and pt agreeable to plan of care and goals.     Anticipated barriers to physical therapy:  chronicity, age    Goals:   Short Term Goals (3 Weeks): - Appropriate, ongoing  1. Patient will be independent with home exercise program to supplement physical therapy treatment in improving functional status.  2.Pt will improve impaired lower extremity manual muscle tests to >/= 4/5 to improve dynamic right knee support for closed chain tasks.  3. Patient will improve (right) knee active range of motion to 0-90 degrees to promote increased ease of sit<>stand transfers.  4. Patient will perform timed up and go with less restrictive assistive device in < 15 seconds to improve gait speed and safety with community ambulation.     Long Term Goals (6 Weeks): - Appropriate, ongoing  1. Pt will improve impaired lower extremity manual muscle tests to >/= 4+/5 to improve dynamic right knee support for closed chain tasks.  2.  Patient will improve the total FOTO Knee Survey Score to </= 40% limited to demonstrate increased perceived functional mobility.  3. Patient will improve score on KOOS Jr. section of FOTO Knee Survey to = </= 70% to demonstrate increased perceived functional mobility.  4. Patient will perform timed up and go with least restrictive assistive device in < 12 seconds to improve gait speed and safety with community ambulation.  5. Patient will perform at least 8 sit to stands in 30 seconds without UE support to demonstrate increased functional strength.   6. Patient will improve (right) knee active range of motion to 0-120 degrees to promote higher level transfers and transitions without limitation.     PLAN     Plan of Care Certification: 11/7/2024 to 12/20/2024.     Outpatient Physical Therapy 3-4 times weekly for 6 weeks to include the following interventions: Aquatic Therapy, Cervical/Lumbar Traction, Electrical Stimulation TENS, Gait Training, Manual Therapy, Moist Heat/ Ice, Neuromuscular Re-ed, Orthotic Management and Training, Patient Education, Self Care, Therapeutic Activities, Therapeutic Exercise, and functional dry needling.     PT/PTA met face to face to discuss patient's treatment plan and progress towards established goals. Patient will be seen by physical therapist every sixth visit and minimally once per month.    Yessi Echevarria, PT, DPT, OCS

## 2024-12-13 ENCOUNTER — CLINICAL SUPPORT (OUTPATIENT)
Dept: REHABILITATION | Facility: HOSPITAL | Age: 74
End: 2024-12-13
Payer: MEDICARE

## 2024-12-13 DIAGNOSIS — M25.661 DECREASED RANGE OF MOTION (ROM) OF RIGHT KNEE: Primary | ICD-10-CM

## 2024-12-13 PROCEDURE — 97530 THERAPEUTIC ACTIVITIES: CPT | Mod: KX,CQ

## 2024-12-13 NOTE — PROGRESS NOTES
[facility]   Physical Therapy Treatment Note     Name: Sandra Lui  Clinic Number: [id number]    Therapy Diagnosis:   Encounter Diagnosis   Name Primary?    Decreased range of motion (ROM) of right knee Yes     Physician: Sha Duran MD    Visit Date: 12/13/2024    Physician Orders: PT Eval and Treat   Medical Diagnosis from Referral: M17.11 (ICD-10-CM) - Unilateral primary osteoarthritis, right knee  Evaluation Date: 11/7/2024  Authorization Period Expiration: 12/31/2024  Plan of Care Expiration: 12/20/2024  Progress Note Due: 10th visit  Visit # / Visits authorized: 19 / 25  Episode Visit: 21  FOTO: 2/3    PTA Visit #: 1 / 5     Time In: 0853  Time Out: 1010  Total Billable Time: 30 minutes (2 TA)    Precautions: Standard, Diabetes    SUBJECTIVE     Patient reports: she feels good today.  She was compliant with home exercise program.  Response to previous treatment: appropriate muscle response  Functional change: ongoing; ambulating with SPC    Pain: 6/10, currently "sore"  Location: Right knee    Patient goals: walk without pain    OBJECTIVE     Objective Measures updated at progress report unless specified.   DOS 11/5/2024  Patient is 5 weeks, 3 days s/p Right TKA as of 12/13/2024.    11/7/2024 AROM: Right knee: 5-65 degrees, PROM: Right knee: 70 degrees  11/13/2024: Lacking 3 degrees Extension AROM; 78 degrees AAROM Flexion  11/15/2024: lacking 4; 0-82 AA/PROM at end of session  11/18/2024: lacking 2- 80 AAROM  11/20/2024: 5- 95 AAROM  11/22/2024: Lacking 5 degrees AROM Extension; 95 degrees AAROM Flexion   11/25/2024: 98 degrees AAROM Flexion  11/26/2024: 2-98 degrees; 100 degrees AAROM Flexion  11/27/2024: 105 degrees AAROM Flexion  12/2/2024: 105 degrees AAROM Flexion  12/4/2024: 101 AROM, 104 AAROM  12/6/2024: 101 degrees AAROM  12/9/2024: 109 degrees AAROM  12/11/2024: 1-110 AAROM  12/13/2024: 98 degrees AROM, 106 degrees AAROM Flexion    Treatment     Pat received " the treatments listed below:      manual therapy techniques: Joint mobilizations were applied to the: Right knee for 00 minutes, including:   Patella mobilizations - tender in medial knee  Gentle effleurage to Right knee for swelling reduction  Scar mobility to improve tissue mobility (educated on performing at home)    neuromuscular re-education activities to improve: Balance, Coordination, Kinesthetic, Sense, Proprioception, and motor control for 30 minutes. The following activities were included:  Patient education: compliance with HEP, use of ice and elevation for swelling reduction, ROM expectations post TKA  - Ambulation in clinic with single point cane. Emphasis on heel strike, terminal knee extension, toe off and knee flexion during gait cycle.   - Standing gastroc inhibition for full quad activation: 30 seconds, 4 reps - NP  - Heel slides; 10 second hold, 5 minutes with therapist assist for normal gait mechanics  - Recumbent Bike for gait mechanics; 5 minutes, FULL revolutions (seat 16)  - Quad sets; 10 second hold, 3 minutes - NP  - SAQs with medium bolster; 10 second hold, 30 reps + 4 lb AW  - SAQs with 1/2 foam; 5 second hold, 3 minutes - significant difficulty - NP  - LAQs at EOM; 10 second hold, 30 reps + 4 lb AW  - Standing TKE + Blue TB; 10 second hold, 2 x 15 reps    therapeutic activities to improve functional performance for 47 minutes, including:  - Sit to stands in std chair + 1 airex: 3 x 10 reps holding 6 lb DB's  - Step ups onto 6-inch step: 3 x 10 reps - mod cueing for forward weight distribution during step up for wbing tolerance  - Lateral step ups onto 6-inch step; 3 x 10 reps  - Mini squats in // bars; 5 second hold, 2 x 10 reps - held  - Wil navigation to improve community ambulation (step-to gait pattern) with medium hurdles x 5 minutes each    Patient Education and Home Exercises     Home Exercises Provided and Patient Education Provided     Education provided:   - compliance with  "HEP  - plan of care  - prognosis  - s/s DVT  - importance of range of motion for proper gait cycle and functional mobility  - [facility] Line Phone #: [phone].  - proper use of RW; ambulation of > 200 feet, proper height  - transfers: seated to standing, lateral transfer, safety    Written Home Exercises Provided: Patient instructed to cont prior HEP. Exercises were reviewed and Pat was able to demonstrate them prior to the end of the session.  Pat demonstrated good  understanding of the education provided. See EMR under Patient Instructions for exercises provided during therapy sessions    ASSESSMENT     Pat is 5 weeks, 3 days s/p Right TKA. She presents ambulating with single point cane, however ambulates throughout clinic without AD. Overall good tolerance to therapeutic interventions noting adequate muscle response throughout. Able to complete full revolutions on recumbent bike with improved tolerance and ease compared to previous sessions. She notes medial knee "burning" pain during heel slides limiting her to 105/106 degrees AAROM Flexion this session. Burning discomfort improves with elevated height of mat. Will continue per POC towards treatment goals.  Pat Is progressing well towards her goals.   Pt prognosis is Good.     Pt will continue to benefit from skilled outpatient physical therapy to address the deficits listed in the problem list box on initial evaluation, provide pt/family education and to maximize pt's level of independence in the home and community environment.     Pt's spiritual, cultural and educational needs considered and pt agreeable to plan of care and goals.     Anticipated barriers to physical therapy:  chronicity, age    Goals:   Short Term Goals (3 Weeks): - Appropriate, ongoing  1. Patient will be independent with home exercise program to supplement physical therapy treatment in improving functional status.  2.Pt will improve impaired lower extremity manual muscle " tests to >/= 4/5 to improve dynamic right knee support for closed chain tasks.  3. Patient will improve (right) knee active range of motion to 0-90 degrees to promote increased ease of sit<>stand transfers.  4. Patient will perform timed up and go with less restrictive assistive device in < 15 seconds to improve gait speed and safety with community ambulation.     Long Term Goals (6 Weeks): - Appropriate, ongoing  1. Pt will improve impaired lower extremity manual muscle tests to >/= 4+/5 to improve dynamic right knee support for closed chain tasks.  2. Patient will improve the total FOTO Knee Survey Score to </= 40% limited to demonstrate increased perceived functional mobility.  3. Patient will improve score on KOOS Jr. section of FOTO Knee Survey to = </= 70% to demonstrate increased perceived functional mobility.  4. Patient will perform timed up and go with least restrictive assistive device in < 12 seconds to improve gait speed and safety with community ambulation.  5. Patient will perform at least 8 sit to stands in 30 seconds without UE support to demonstrate increased functional strength.   6. Patient will improve (right) knee active range of motion to 0-120 degrees to promote higher level transfers and transitions without limitation.     PLAN     Plan of Care Certification: 11/7/2024 to 12/20/2024.     Outpatient Physical Therapy 3-4 times weekly for 6 weeks to include the following interventions: Aquatic Therapy, Cervical/Lumbar Traction, Electrical Stimulation TENS, Gait Training, Manual Therapy, Moist Heat/ Ice, Neuromuscular Re-ed, Orthotic Management and Training, Patient Education, Self Care, Therapeutic Activities, Therapeutic Exercise, and functional dry needling.     PT/PTA met face to face to discuss patient's treatment plan and progress towards established goals. Patient will be seen by physical therapist every sixth visit and minimally once per month.    Cristy Cordova PTA

## 2024-12-17 ENCOUNTER — CLINICAL SUPPORT (OUTPATIENT)
Dept: REHABILITATION | Facility: HOSPITAL | Age: 74
End: 2024-12-17
Payer: MEDICARE

## 2024-12-17 DIAGNOSIS — M25.661 DECREASED RANGE OF MOTION (ROM) OF RIGHT KNEE: Primary | ICD-10-CM

## 2024-12-17 PROCEDURE — 97530 THERAPEUTIC ACTIVITIES: CPT | Mod: KX

## 2024-12-17 NOTE — PROGRESS NOTES
"[facility]   Physical Therapy Treatment Note     Name: Sandra Lui  Clinic Number: [id number]    Therapy Diagnosis:   Encounter Diagnosis   Name Primary?    Decreased range of motion (ROM) of right knee Yes     Physician: Sha Duran MD    Visit Date: 12/17/2024    Physician Orders: PT Eval and Treat   Medical Diagnosis from Referral: M17.11 (ICD-10-CM) - Unilateral primary osteoarthritis, right knee  Evaluation Date: 11/7/2024  Authorization Period Expiration: 12/31/2024  Plan of Care Expiration: 12/20/2024  Progress Note Due: 10th visit  Visit # / Visits authorized: 19 / 25  Episode Visit: 21  FOTO: 2/3    PTA Visit #: 0 / 5     Time In: 8:55 am  Time Out: 10:05 am  Total treatment time: 70 minutes  Total Billable Time: 39 minutes (3 TA)    Precautions: Standard, Diabetes    SUBJECTIVE     Patient reports: she feels good today. Has MD appointment on Thursday for 6 week follow up.  She was compliant with home exercise program.  Response to previous treatment: appropriate muscle response  Functional change: ongoing; ambulating with SPC    Pain: 6/10, currently "sore"  Location: Right knee    Patient goals: walk without pain    OBJECTIVE     Objective Measures updated at progress report unless specified.   DOS 11/5/2024  Patient is 5 weeks, 3 days s/p Right TKA as of 12/13/2024.    11/7/2024 AROM: Right knee: 5-65 degrees, PROM: Right knee: 70 degrees  11/13/2024: Lacking 3 degrees Extension AROM; 78 degrees AAROM Flexion  11/15/2024: lacking 4; 0-82 AA/PROM at end of session  11/18/2024: lacking 2- 80 AAROM  11/20/2024: 5- 95 AAROM  11/22/2024: Lacking 5 degrees AROM Extension; 95 degrees AAROM Flexion   11/25/2024: 98 degrees AAROM Flexion  11/26/2024: 2-98 degrees; 100 degrees AAROM Flexion  11/27/2024: 105 degrees AAROM Flexion  12/2/2024: 105 degrees AAROM Flexion  12/4/2024: 101 AROM, 104 AAROM  12/6/2024: 101 degrees AAROM  12/9/2024: 109 degrees AAROM  12/11/2024: 1-110 " AAROM  12/13/2024: 98 degrees AROM, 106 degrees AAROM Flexion  12/17/2024: 1-108 AROM    Treatment     Pat received the treatments listed below:      manual therapy techniques: Joint mobilizations were applied to the: Right knee for 8 minutes, including:   Patella mobilizations  EOM AP<>PA tibiofemoral mobilizations grade III  AP tibiofemoral mobs grade in 90 degrees flexion    neuromuscular re-education activities to improve: Balance, Coordination, Kinesthetic, Sense, Proprioception, and motor control for 25 minutes. The following activities were included:  Patient education: compliance with HEP, use of ice and elevation for swelling reduction, ROM expectations post TKA   - Recumbent Bike for gait mechanics; 5 minutes, FULL revolutions (seat 16)  - SAQs with medium bolster; 10 second hold, 30 reps + 4 lb AW -held  -Heel prop quad set: 10 seconds, 10x   - LAQs at EOM; 10 second hold, 30 reps + 5 lb AW  - Standing TKE + Blue TB; 10 second hold, 2 x 15 reps    therapeutic activities to improve functional performance for 45 minutes, including:  - Sit to stands in std chair + 1 airex: 3 x 10 reps holding 6 lb DB's  - Step ups onto 6-inch step: 3 x 10 reps - mod cueing for forward weight distribution during step up for wbing tolerance  - Lateral step ups onto 6-inch step; 3 x 10 reps  - Mini squats in // bars; 5 second hold, 2 x 10 reps  - Wil navigation to improve community ambulation (step-to gait pattern) with medium hurdles    Patient Education and Home Exercises     Home Exercises Provided and Patient Education Provided     Education provided:   - compliance with HEP  - plan of care  - prognosis  - s/s DVT  - importance of range of motion for proper gait cycle and functional mobility  - [facility] Phone #: [phone].  - proper use of RW; ambulation of > 200 feet, proper height  - transfers: seated to standing, lateral transfer, safety    Written Home Exercises Provided: Patient instructed to  cont prior HEP. Exercises were reviewed and Pat was able to demonstrate them prior to the end of the session.  Pat demonstrated good  understanding of the education provided. See EMR under Patient Instructions for exercises provided during therapy sessions    ASSESSMENT     Pat is 6 weeks s/p Right TKA. Demonstrates 1-108 degrees of AROM at this time. Patient demonstrates improving capacity for sit to stands at this time demonstrating improving functional mobility.     Pat Is progressing well towards her goals.   Pt prognosis is Good.     Pt will continue to benefit from skilled outpatient physical therapy to address the deficits listed in the problem list box on initial evaluation, provide pt/family education and to maximize pt's level of independence in the home and community environment.     Pt's spiritual, cultural and educational needs considered and pt agreeable to plan of care and goals.     Anticipated barriers to physical therapy:  chronicity, age    Goals:   Short Term Goals (3 Weeks): - Appropriate, ongoing  1. Patient will be independent with home exercise program to supplement physical therapy treatment in improving functional status.  2.Pt will improve impaired lower extremity manual muscle tests to >/= 4/5 to improve dynamic right knee support for closed chain tasks.  3. Patient will improve (right) knee active range of motion to 0-90 degrees to promote increased ease of sit<>stand transfers.  4. Patient will perform timed up and go with less restrictive assistive device in < 15 seconds to improve gait speed and safety with community ambulation.     Long Term Goals (6 Weeks): - Appropriate, ongoing  1. Pt will improve impaired lower extremity manual muscle tests to >/= 4+/5 to improve dynamic right knee support for closed chain tasks.  2. Patient will improve the total FOTO Knee Survey Score to </= 40% limited to demonstrate increased perceived functional mobility.  3. Patient will improve score on  KOOS Jr. section of FOTO Knee Survey to = </= 70% to demonstrate increased perceived functional mobility.  4. Patient will perform timed up and go with least restrictive assistive device in < 12 seconds to improve gait speed and safety with community ambulation.  5. Patient will perform at least 8 sit to stands in 30 seconds without UE support to demonstrate increased functional strength.   6. Patient will improve (right) knee active range of motion to 0-120 degrees to promote higher level transfers and transitions without limitation.     PLAN     Plan of Care Certification: 11/7/2024 to 12/20/2024.     Outpatient Physical Therapy 3-4 times weekly for 6 weeks to include the following interventions: Aquatic Therapy, Cervical/Lumbar Traction, Electrical Stimulation TENS, Gait Training, Manual Therapy, Moist Heat/ Ice, Neuromuscular Re-ed, Orthotic Management and Training, Patient Education, Self Care, Therapeutic Activities, Therapeutic Exercise, and functional dry needling.     PT/PTA met face to face to discuss patient's treatment plan and progress towards established goals. Patient will be seen by physical therapist every sixth visit and minimally once per month.    Yessi Echevarria, PT, DPT, OCS

## 2024-12-19 ENCOUNTER — OFFICE VISIT (OUTPATIENT)
Dept: ORTHOPEDICS | Facility: CLINIC | Age: 74
End: 2024-12-19
Payer: MEDICARE

## 2024-12-19 ENCOUNTER — HOSPITAL ENCOUNTER (OUTPATIENT)
Dept: RADIOLOGY | Facility: HOSPITAL | Age: 74
Discharge: HOME | End: 2024-12-19
Payer: MEDICARE

## 2024-12-19 VITALS — WEIGHT: 250 LBS | BODY MASS INDEX: 39.24 KG/M2 | HEIGHT: 67 IN

## 2024-12-19 DIAGNOSIS — M25.561 RIGHT KNEE PAIN, UNSPECIFIED CHRONICITY: ICD-10-CM

## 2024-12-19 DIAGNOSIS — M25.561 RIGHT KNEE PAIN, UNSPECIFIED CHRONICITY: Primary | ICD-10-CM

## 2024-12-19 PROCEDURE — 1100F PTFALLS ASSESS-DOCD GE2>/YR: CPT | Mod: CPTII,S$GLB,,

## 2024-12-19 PROCEDURE — 1160F RVW MEDS BY RX/DR IN RCRD: CPT | Mod: CPTII,S$GLB,,

## 2024-12-19 PROCEDURE — 99999 PR PBB SHADOW E&M-EST. PATIENT-LVL IV: CPT | Mod: PBBFAC,,,

## 2024-12-19 PROCEDURE — 1159F MED LIST DOCD IN RCRD: CPT | Mod: CPTII,S$GLB,,

## 2024-12-19 PROCEDURE — 73562 X-RAY EXAM OF KNEE 3: CPT | Mod: 26,RT,, | Performed by: RADIOLOGY

## 2024-12-19 PROCEDURE — 3288F FALL RISK ASSESSMENT DOCD: CPT | Mod: CPTII,S$GLB,,

## 2024-12-19 PROCEDURE — 73562 X-RAY EXAM OF KNEE 3: CPT | Mod: TC,RT

## 2024-12-19 PROCEDURE — 1125F AMNT PAIN NOTED PAIN PRSNT: CPT | Mod: CPTII,S$GLB,,

## 2024-12-19 PROCEDURE — 99024 POSTOP FOLLOW-UP VISIT: CPT | Mod: S$GLB,,,

## 2024-12-19 NOTE — PROGRESS NOTES
".HPI:  Sandra Lui presents for 6-week post-operative visit following a right total knee arthroplasty performed by Dr. Duran on 11/5/2024. Patient reports an overall satisfactory recovery.   Medications:  Occasional Tylenol    Assistive Devices:  Occasional cane   PT:  Ongoing at [facility]   Limitations:  None    Exam:   Height 5' 7" (1.702 m), weight 113.4 kg (250 lb).   Gait: normal with assistive device  Incision: healed, clean, and dry without drainage or erythema   Stability:  Knee stable anterior-posterior varus and valgus stresses, no extensor lag    Current ROM:  2-100  Pre-op ROM:  0-95  PT ROM:  1-108  Knee alignment:  0     Imaging:  Radiographs taken today and reviewed revealing a well fixed and aligned prosthesis.    A/P:  6 weeks s/p right total knee arthroplasty    - Patient's recovery is progressing well with ROM of 1-108 and a well healing incision.   - Outpatient PT: ongoing at [facility]  - Pain medication:  No refill needed   - Follow up in 6 weeks with Dr. Duran. Pt will call clinic with any problems/concerns.       "

## 2024-12-19 NOTE — TELEPHONE ENCOUNTER
----- Message from Kevin sent at 12/19/2024 12:10 PM CST -----  Type: RX Refill Request     Who Called:   Pt  Have you contacted your pharmacy:     Refill     RX Name and Strength:   gabapentin (NEURONTIN) 300 MG capsule  Preferred Pharmacy with phone number:     [facility] [place] [id number] [place] [address]  Phone: [phone] Fax: [phone]    Local or Mail Order:   local  Would the patient rather a call back or a response via [facility]?   Call back  Best Call Back Number:   Telephone Information:  Mobile          [phone]    Additional Information:   Pt would like to know if she can a month worth of supplies. She's currently getting 2 weeks.  Thank you.

## 2024-12-20 ENCOUNTER — CLINICAL SUPPORT (OUTPATIENT)
Dept: REHABILITATION | Facility: HOSPITAL | Age: 74
End: 2024-12-20
Payer: MEDICARE

## 2024-12-20 DIAGNOSIS — M25.661 DECREASED RANGE OF MOTION (ROM) OF RIGHT KNEE: Primary | ICD-10-CM

## 2024-12-20 RX ORDER — GABAPENTIN 300 MG/1
300 CAPSULE ORAL 2 TIMES DAILY
Qty: 30 CAPSULE | Refills: 0 | OUTPATIENT
Start: 2024-12-20

## 2024-12-20 NOTE — PROGRESS NOTES
"[facility]   Physical Therapy Treatment Note     Name: Sandra Lui  Clinic Number: [id number]    Therapy Diagnosis:   Encounter Diagnosis   Name Primary?    Decreased range of motion (ROM) of right knee Yes     Physician: Sha Duran MD    Visit Date: 12/20/2024    Physician Orders: PT Eval and Treat   Medical Diagnosis from Referral: M17.11 (ICD-10-CM) - Unilateral primary osteoarthritis, right knee  Evaluation Date: 11/7/2024  Authorization Period Expiration: 12/31/2024  Plan of Care Expiration: 12/20/2024  Progress Note Due: 10th visit  Visit # / Visits authorized: 19 / 25  Episode Visit: 21  FOTO: 2/3    PTA Visit #: 0 / 5     Time In: 9:15 am  Time Out: 9:20 am  Total treatment time: 00 minutes  Total Billable Time: 00 minutes     Precautions: Standard, Diabetes    SUBJECTIVE     Patient reports: she is not exercising today. Patient reports she is done with PT per MD even though she wants to continue. Reports she has a lot of things to do today including a flight at 3 pm.   She was compliant with home exercise program.  Response to previous treatment: appropriate muscle response  Functional change: ongoing; ambulating with SPC    Pain: 6/10, currently "sore"  Location: Right knee    Patient goals: walk without pain    OBJECTIVE     Objective Measures updated at progress report unless specified.   DOS 11/5/2024  Patient is 5 weeks, 3 days s/p Right TKA as of 12/13/2024.    11/7/2024 AROM: Right knee: 5-65 degrees, PROM: Right knee: 70 degrees  11/13/2024: Lacking 3 degrees Extension AROM; 78 degrees AAROM Flexion  11/15/2024: lacking 4; 0-82 AA/PROM at end of session  11/18/2024: lacking 2- 80 AAROM  11/20/2024: 5- 95 AAROM  11/22/2024: Lacking 5 degrees AROM Extension; 95 degrees AAROM Flexion   11/25/2024: 98 degrees AAROM Flexion  11/26/2024: 2-98 degrees; 100 degrees AAROM Flexion  11/27/2024: 105 degrees AAROM Flexion  12/2/2024: 105 degrees AAROM Flexion  12/4/2024: " 101 AROM, 104 AAROM  12/6/2024: 101 degrees AAROM  12/9/2024: 109 degrees AAROM  12/11/2024: 1-110 AAROM  12/13/2024: 98 degrees AROM, 106 degrees AAROM Flexion  12/17/2024: 1-108 AROM    Treatment     Pat received the treatments listed below:      manual therapy techniques: Joint mobilizations were applied to the: Right knee for 8 minutes, including:   Patella mobilizations  EOM AP<>PA tibiofemoral mobilizations grade III  AP tibiofemoral mobs grade in 90 degrees flexion    neuromuscular re-education activities to improve: Balance, Coordination, Kinesthetic, Sense, Proprioception, and motor control for 25 minutes. The following activities were included:  Patient education: compliance with HEP, use of ice and elevation for swelling reduction, ROM expectations post TKA   - Recumbent Bike for gait mechanics; 5 minutes, FULL revolutions (seat 16)  - SAQs with medium bolster; 10 second hold, 30 reps + 4 lb AW -held  -Heel prop quad set: 10 seconds, 10x   - LAQs at EOM; 10 second hold, 30 reps + 5 lb AW  - Standing TKE + Blue TB; 10 second hold, 2 x 15 reps    therapeutic activities to improve functional performance for 45 minutes, including:  - Sit to stands in std chair + 1 airex: 3 x 10 reps holding 6 lb DB's  - Step ups onto 6-inch step: 3 x 10 reps - mod cueing for forward weight distribution during step up for wbing tolerance  - Lateral step ups onto 6-inch step; 3 x 10 reps  - Mini squats in // bars; 5 second hold, 2 x 10 reps  - Wil navigation to improve community ambulation (step-to gait pattern) with medium hurdles    Patient Education and Home Exercises     Home Exercises Provided and Patient Education Provided     Education provided:   - compliance with HEP  - plan of care  - prognosis  - s/s DVT  - importance of range of motion for proper gait cycle and functional mobility  - [facility] Phone #: [phone].  - proper use of RW; ambulation of > 200 feet, proper height  - transfers:  seated to standing, lateral transfer, safety    Written Home Exercises Provided: Patient instructed to cont prior HEP. Exercises were reviewed and Pat was able to demonstrate them prior to the end of the session.  Pat demonstrated good  understanding of the education provided. See EMR under Patient Instructions for exercises provided during therapy sessions    ASSESSMENT     Pat is 6 weeks s/p Right TKA. Improved FOTO score. Patient declined reassessment and exercise. Patient has returned to full independence with all household and personal ADL's at this time. Patient will be discharged with an updated HEP. No charges dropped.      Date of Last visit: 12/17/2024  Total Visits Received: 21    Discharge FOTO Score: see media section   Discharge reason: Patient has completed the physician's prescription    This patient is discharged from Physical Therapy        Pat Is progressing well towards her goals.   Pt prognosis is Good.     Pt will continue to benefit from skilled outpatient physical therapy to address the deficits listed in the problem list box on initial evaluation, provide pt/family education and to maximize pt's level of independence in the home and community environment.     Pt's spiritual, cultural and educational needs considered and pt agreeable to plan of care and goals.     Anticipated barriers to physical therapy:  chronicity, age    Goals:   Short Term Goals (3 Weeks): - Appropriate, ongoing  1. Patient will be independent with home exercise program to supplement physical therapy treatment in improving functional status. MET  2.Pt will improve impaired lower extremity manual muscle tests to >/= 4/5 to improve dynamic right knee support for closed chain tasks.NT  3. Patient will improve (right) knee active range of motion to 0-90 degrees to promote increased ease of sit<>stand transfers.MET  4. Patient will perform timed up and go with less restrictive assistive device in < 15 seconds to improve gait  speed and safety with community ambulation.NT     Long Term Goals (6 Weeks): - Appropriate, ongoing  1. Pt will improve impaired lower extremity manual muscle tests to >/= 4+/5 to improve dynamic right knee support for closed chain tasks. NT  2. Patient will improve the total FOTO Knee Survey Score to </= 40% limited to demonstrate increased perceived functional mobility.see media section  3. Patient will improve score on KOOS Jr. section of FOTO Knee Survey to = </= 70% to demonstrate increased perceived functional mobility.see media section  4. Patient will perform timed up and go with least restrictive assistive device in < 12 seconds to improve gait speed and safety with community ambulation.NT  5. Patient will perform at least 8 sit to stands in 30 seconds without UE support to demonstrate increased functional strength. NT  6. Patient will improve (right) knee active range of motion to 0-120 degrees to promote higher level transfers and transitions without limitation. unmet    PLAN     This patient is discharged from Physical Therapy    Yessi Echevarria, PT, DPT, OCS

## 2024-12-30 RX ORDER — GABAPENTIN 300 MG/1
300 CAPSULE ORAL 2 TIMES DAILY
Qty: 30 CAPSULE | Refills: 0 | Status: SHIPPED | OUTPATIENT
Start: 2024-12-30

## 2025-01-07 ENCOUNTER — TELEPHONE (OUTPATIENT)
Dept: VASCULAR SURGERY | Facility: CLINIC | Age: 75
End: 2025-01-07
Payer: MEDICARE

## 2025-01-07 ENCOUNTER — OFFICE VISIT (OUTPATIENT)
Dept: INTERNAL MEDICINE | Facility: CLINIC | Age: 75
End: 2025-01-07
Payer: MEDICARE

## 2025-01-07 ENCOUNTER — LAB VISIT (OUTPATIENT)
Dept: LAB | Facility: HOSPITAL | Age: 75
End: 2025-01-07
Payer: MEDICARE

## 2025-01-07 ENCOUNTER — TELEPHONE (OUTPATIENT)
Dept: OPHTHALMOLOGY | Facility: CLINIC | Age: 75
End: 2025-01-07
Payer: MEDICARE

## 2025-01-07 VITALS
WEIGHT: 246.94 LBS | DIASTOLIC BLOOD PRESSURE: 60 MMHG | OXYGEN SATURATION: 98 % | SYSTOLIC BLOOD PRESSURE: 110 MMHG | HEART RATE: 74 BPM | BODY MASS INDEX: 38.76 KG/M2 | HEIGHT: 67 IN

## 2025-01-07 DIAGNOSIS — I10 HYPERTENSION, UNSPECIFIED TYPE: ICD-10-CM

## 2025-01-07 DIAGNOSIS — Z98.49 STATUS POST CATARACT EXTRACTION, UNSPECIFIED LATERALITY: Primary | ICD-10-CM

## 2025-01-07 DIAGNOSIS — M06.9 RHEUMATOID ARTHRITIS, INVOLVING UNSPECIFIED SITE, UNSPECIFIED WHETHER RHEUMATOID FACTOR PRESENT: ICD-10-CM

## 2025-01-07 DIAGNOSIS — G62.9 PERIPHERAL POLYNEUROPATHY: ICD-10-CM

## 2025-01-07 DIAGNOSIS — M81.0 OSTEOPOROSIS, UNSPECIFIED OSTEOPOROSIS TYPE, UNSPECIFIED PATHOLOGICAL FRACTURE PRESENCE: ICD-10-CM

## 2025-01-07 DIAGNOSIS — M35.3 PMR (POLYMYALGIA RHEUMATICA): ICD-10-CM

## 2025-01-07 DIAGNOSIS — R73.03 PRE-DIABETES: ICD-10-CM

## 2025-01-07 DIAGNOSIS — G47.33 OSA (OBSTRUCTIVE SLEEP APNEA): ICD-10-CM

## 2025-01-07 DIAGNOSIS — K21.9 GASTROESOPHAGEAL REFLUX DISEASE, UNSPECIFIED WHETHER ESOPHAGITIS PRESENT: ICD-10-CM

## 2025-01-07 DIAGNOSIS — J45.909 ASTHMA, UNSPECIFIED ASTHMA SEVERITY, UNSPECIFIED WHETHER COMPLICATED, UNSPECIFIED WHETHER PERSISTENT: ICD-10-CM

## 2025-01-07 DIAGNOSIS — E78.5 HYPERLIPIDEMIA, UNSPECIFIED HYPERLIPIDEMIA TYPE: ICD-10-CM

## 2025-01-07 DIAGNOSIS — G45.9 TIA (TRANSIENT ISCHEMIC ATTACK): ICD-10-CM

## 2025-01-07 DIAGNOSIS — I77.9 CAROTID ARTERY DISEASE, UNSPECIFIED LATERALITY, UNSPECIFIED TYPE: ICD-10-CM

## 2025-01-07 DIAGNOSIS — Z23 NEED FOR VACCINATION: ICD-10-CM

## 2025-01-07 LAB
CCP AB SER IA-ACNC: <0.5 U/ML
CRP SERPL-MCNC: 0.9 MG/L (ref 0–8.2)
ERYTHROCYTE [SEDIMENTATION RATE] IN BLOOD BY PHOTOMETRIC METHOD: 29 MM/HR (ref 0–36)
RHEUMATOID FACT SERPL-ACNC: <13 IU/ML (ref 0–15)

## 2025-01-07 PROCEDURE — 3288F FALL RISK ASSESSMENT DOCD: CPT | Mod: CPTII,GC,S$GLB,

## 2025-01-07 PROCEDURE — 99999 PR PBB SHADOW E&M-EST. PATIENT-LVL V: CPT | Mod: PBBFAC,GC,,

## 2025-01-07 PROCEDURE — 90653 IIV ADJUVANT VACCINE IM: CPT | Mod: S$GLB,,, | Performed by: INTERNAL MEDICINE

## 2025-01-07 PROCEDURE — 86431 RHEUMATOID FACTOR QUANT: CPT

## 2025-01-07 PROCEDURE — 1160F RVW MEDS BY RX/DR IN RCRD: CPT | Mod: CPTII,GC,S$GLB,

## 2025-01-07 PROCEDURE — 3074F SYST BP LT 130 MM HG: CPT | Mod: CPTII,GC,S$GLB,

## 2025-01-07 PROCEDURE — 86200 CCP ANTIBODY: CPT

## 2025-01-07 PROCEDURE — 36415 COLL VENOUS BLD VENIPUNCTURE: CPT

## 2025-01-07 PROCEDURE — 3078F DIAST BP <80 MM HG: CPT | Mod: CPTII,GC,S$GLB,

## 2025-01-07 PROCEDURE — G0008 ADMIN INFLUENZA VIRUS VAC: HCPCS | Mod: S$GLB,,, | Performed by: INTERNAL MEDICINE

## 2025-01-07 PROCEDURE — 86140 C-REACTIVE PROTEIN: CPT

## 2025-01-07 PROCEDURE — 1126F AMNT PAIN NOTED NONE PRSNT: CPT | Mod: CPTII,GC,S$GLB,

## 2025-01-07 PROCEDURE — 99213 OFFICE O/P EST LOW 20 MIN: CPT | Mod: GC,S$GLB,,

## 2025-01-07 PROCEDURE — 1159F MED LIST DOCD IN RCRD: CPT | Mod: CPTII,GC,S$GLB,

## 2025-01-07 PROCEDURE — 85652 RBC SED RATE AUTOMATED: CPT

## 2025-01-07 PROCEDURE — 1101F PT FALLS ASSESS-DOCD LE1/YR: CPT | Mod: CPTII,GC,S$GLB,

## 2025-01-07 RX ORDER — TRAMADOL HYDROCHLORIDE 50 MG/1
50 TABLET ORAL EVERY 12 HOURS
Qty: 14 TABLET | Refills: 0 | Status: SHIPPED | OUTPATIENT
Start: 2025-01-07 | End: 2025-01-14

## 2025-01-07 RX ORDER — GABAPENTIN 300 MG/1
300 CAPSULE ORAL 2 TIMES DAILY
Qty: 120 CAPSULE | Refills: 0 | Status: SHIPPED | OUTPATIENT
Start: 2025-01-07

## 2025-01-07 NOTE — TELEPHONE ENCOUNTER
----- Message from Med Assistant Iris sent at 1/7/2025  4:01 PM CST -----  Dr.Sameera Aponte Put A Referral In For Patient To Schedule With Vascular Surgery    Please Call And Assist Patient With Scheduling    Thank You        Carotid artery disease, unspecified laterality, unspecified type [I77.9]

## 2025-01-07 NOTE — PROGRESS NOTES
Subjective     Chief Complaint:    History of Present Illness:  Ms. Sandra Lui is a 74 y.o. female  who recently moved back to [place] from [place], who is a known case of RUSH on CPAP at night, TIA's x2 (last one was 1.5 years ago) on aspirin, carotid stenosis bilaterally on plavix (used to follow with vascular in [place]- not amneable to surgery at the time being), HTN, bronchial asthma, back pain and disc prolapse, biltateral knee pain (will undergo replacement with Dr. Delgado) who presented for a followup. Ever since her last visit, she has not been scheduled to any of the referrals I sent her to.       Polymyalgia rheumatica  -      longstanding  -     on gabapentin, stable     Hypertension, unspecified type  -     110/70 in the office  -      compliant to medications     TIA (transient ischemic attack)  -     Ambulatory referral/consult to Neurology; Future  -     lipid profile WNL   -     on aspirin and rosuvastatin    Peripheral neuropathy       -     on gabapentin       -     stable     RUSH (obstructive sleep apnea)        -     CPAP at night     Asthma, unspecified asthma severity, unspecified whether complicated, unspecified whether persistent  -     on bronchodilators       Rheumatoid arthritis, involving unspecified site, unspecified whether rheumatoid factor present  -     Ambulatory referral/consult to Rheumatology; Future  -    longstanding    Gastroesophageal reflux disease, unspecified whether esophagitis present  -     on pantoprazole    Bilateral carotid artery stenosis  -    on aspirin and plavix as well as crestor       Pre-diabetes Screening  -     HEMOGLOBIN A1C: 5.7% patient is at her baseline HBA1C      Knee replacement      -  s/p surgery in nov 2024      -   done with physiotherapy                PAST HISTORY:     Past Medical History:   Diagnosis Date    Arthritis     Asthma     Depression     Disorder of kidney and ureter     GERD (gastroesophageal reflux disease)     Gout      Hyperlipidemia     Hypertension     Osteoporosis        Past Surgical History:   Procedure Laterality Date    ANKLE SURGERY      Cataract surgeries both sides around 2017      CHOLECYSTECTOMY      HYSTERECTOMY      TONSILLECTOMY      TOTAL KNEE REPLACEMENT USING COMPUTER NAVIGATION Right 11/5/2024    Procedure: ARTHROPLASTY, KNEE, TOTAL, OCTAVIO COMPUTER-ASSISTED NAVIGATION: RIGHT: SAME DAY;  Surgeon: Sha Duran MD;  Location: [facility];  Service: Orthopedics;  Laterality: Right;    TUBAL LIGATION         No family history on file.    Social History     Socioeconomic History    Marital status: Single   Tobacco Use    Smoking status: Never    Smokeless tobacco: Never   Substance and Sexual Activity    Alcohol use: No    Drug use: No     Social Drivers of Health     Financial Resource Strain: Patient Declined (11/5/2024)    Overall Financial Resource Strain (CARDIA)     Difficulty of Paying Living Expenses: Patient declined   Recent Concern: Financial Resource Strain - Medium Risk (8/16/2024)    Overall Financial Resource Strain (CARDIA)     Difficulty of Paying Living Expenses: Somewhat hard   Food Insecurity: Patient Declined (11/5/2024)    Hunger Vital Sign     Worried About Running Out of Food in the Last Year: Patient declined     Ran Out of Food in the Last Year: Patient declined   Recent Concern: Food Insecurity - Food Insecurity Present (8/16/2024)    Hunger Vital Sign     Worried About Running Out of Food in the Last Year: Sometimes true     Ran Out of Food in the Last Year: Sometimes true   Transportation Needs: Patient Declined (11/5/2024)    TRANSPORTATION NEEDS     Transportation : Patient declined   Physical Activity: Inactive (8/16/2024)    Exercise Vital Sign     Days of Exercise per Week: 0 days     Minutes of Exercise per Session: 10 min   Stress: Patient Declined (11/5/2024)    [employer] [place] [employer] - Occupational Stress Questionnaire     Feeling of Stress : Patient declined    Housing Stability: Patient Declined (11/5/2024)    Housing Stability Vital Sign     Unable to Pay for Housing in the Last Year: Patient declined     Homeless in the Last Year: Patient declined       MEDICATIONS & ALLERGIES:     Current Outpatient Medications on File Prior to Visit   Medication Sig    acetaminophen (TYLENOL) 500 MG tablet Take 500 mg by mouth once daily.    acetaminophen (TYLENOL) 650 MG TbSR Take 1 tablet (650 mg total) by mouth every 8 (eight) hours.    albuterol (PROVENTIL/VENTOLIN HFA) 90 mcg/actuation inhaler Inhale 1-2 puffs into the lungs every 6 (six) hours as needed for Wheezing or Shortness of Breath. Rescue    ALBUTEROL, REFILL, INHL Inhale into the lungs. 2 puffs as needed    allopurinoL (ZYLOPRIM) 100 MG tablet Take 100 mg by mouth once daily.    aspirin (ECOTRIN) 81 MG EC tablet Take 1 tablet (81 mg total) by mouth 2 (two) times a day. Hold plavix for 1 week post op    buPROPion (WELLBUTRIN XL) 150 MG TB24 tablet Take 150 mg by mouth once daily.    calcium carbonate (OS-NOBLE) 600 mg calcium (1,500 mg) Tab Take 1,200 mg by mouth once daily.    clobetasoL (TEMOVATE) 0.05 % external solution Apply 1 mL topically once daily.    clopidogreL (PLAVIX) 75 mg tablet Take 1 tablet (75 mg total) by mouth once daily.    colchicine (COLCRYS) 0.6 mg tablet Take 0.6 mg by mouth daily as needed.    ergocalciferol, vitamin D2, (VITAMIN D ORAL) Take by mouth once daily.    famotidine (PEPCID) 40 MG tablet Take 40 mg by mouth once daily.    fluorometholone 0.1% (FML) 0.1 % DrpS Place 1 drop into both eyes Daily.    fluticasone propionate (FLONASE) 50 mcg/actuation nasal spray 1 spray (50 mcg total) by Each Nostril route 2 (two) times daily as needed for Rhinitis.    furosemide (LASIX) 20 MG tablet Take 1 tablet (20 mg total) by mouth 2 (two) times daily.    gabapentin (NEURONTIN) 300 MG capsule Take 1 capsule (300 mg total) by mouth 2 (two) times daily.    ipratropium (ATROVENT) 42 mcg (0.06 %) nasal  "spray 2 sprays by Each Nostril route as needed.    latanoprost 0.005 % ophthalmic solution Place 1 drop into both eyes every evening.    losartan (COZAAR) 50 MG tablet Take 1 tablet (50 mg total) by mouth once daily.    metoprolol succinate (TOPROL-XL) 25 MG 24 hr tablet Take 25 mg by mouth once daily.    omega-3 fatty acids/fish oil (FISH OIL-OMEGA-3 FATTY ACIDS) 300-1,000 mg capsule Take by mouth once daily.    pantoprazole (PROTONIX) 40 MG tablet Take 1 tablet (40 mg total) by mouth once daily.    rosuvastatin (CRESTOR) 20 MG tablet Take 1 tablet (20 mg total) by mouth every evening.    senna-docusate 8.6-50 mg (SENNA WITH DOCUSATE SODIUM) 8.6-50 mg per tablet Take 1 tablet by mouth once daily.    traMADoL (ULTRAM) 50 mg tablet Take 50 mg by mouth every 12 (twelve) hours.    vitamin D (VITAMIN D3) 1000 units Tab Take 1,000 Units by mouth once daily.     No current facility-administered medications on file prior to visit.       Review of patient's allergies indicates:   Allergen Reactions    Adhesive      Skin peeling    Erythromycin      Vomiting, weakness, nausea    Neosporin (neomycin-polymyx) Rash       OBJECTIVE:     Vital Signs:  Vitals:    01/07/25 1421   BP: 110/60   BP Location: Right arm   Pulse: 74   SpO2: 98%   Weight: 112 kg (246 lb 14.6 oz)   Height: 5' 7" (1.702 m)       Body mass index is 38.67 kg/m².     Physical Exam:  General:  Well developed, well nourished, no acute distress  Head: Normocephalic, atraumatic  Eyes: PERRL  Neck: supple, normal ROM, no thyromegaly   CVS:  RRR, S1 and S2 normal, no murmurs, rubs, gallops, 2+ peripheral pulses  Resp:  Lungs clear to auscultation, no wheezes, rales, rhonchi, cough  GI:  Abdomen soft, non-tender, non-distended, normoactive bowel sounds  MSK:  No muscle atrophy, cyanosis, peripheral edema   Skin:  No rashes, ulcers, erythema  Neuro:  No focal deficits noted  Psych:  Appropriate mood and affect, normal judgement    Laboratory  Lab Results   Component " "Value Date    WBC 7.62 08/19/2024    HGB 14.1 08/19/2024    HCT 46.1 08/19/2024    MCV 98 08/19/2024     08/19/2024     @[id number](GLU,NA,K,Cl,CO2,BUN,Creatinine,Calcium,MG)@  Lab Results   Component Value Date    INR 1.0 10/23/2024    INR 1.0 06/19/2019     Lab Results   Component Value Date    HGBA1C 5.7 (H) 08/19/2024     No results for input(s): "POCTGLUCOSE" in the last 72 hours.    Diagnostic Results:  Labs: Reviewed    ASSESSMENT & PLAN:   Ms. Sandra Lui is a 74 y.o. female who presents today for a followup.    Sandra Polk" was seen today for follow-up.    Diagnoses and all orders for this visit:    Status post cataract extraction, unspecified laterality  -     Ambulatory referral/consult to Ophthalmology; Future    TIA (transient ischemic attack)  -     Ambulatory referral/consult to Neurology; Future  -     US Carotid Bilateral; Future  -     Ambulatory referral/consult to Vascular Neurology; Future    Rheumatoid arthritis, involving unspecified site, unspecified whether rheumatoid factor present  -     traMADoL (ULTRAM) 50 mg tablet; Take 1 tablet (50 mg total) by mouth every 12 (twelve) hours. for 7 days  -     Ambulatory referral/consult to Rheumatology; Future  -     RHEUMATOID FACTOR; Future  -     CYCLIC CITRUL PEPTIDE ANTIBODY, IGG; Future  -     Sedimentation rate; Future  -     C-REACTIVE PROTEIN; Future    PMR (polymyalgia rheumatica)  -     traMADoL (ULTRAM) 50 mg tablet; Take 1 tablet (50 mg total) by mouth every 12 (twelve) hours. for 7 days  -     Ambulatory referral/consult to Rheumatology; Future    Pre-diabetes  -     HBA1C    Carotid artery disease, unspecified laterality, unspecified type  -     Ambulatory referral/consult to Vascular Surgery; Future  -     US Carotid Bilateral; Future    Peripheral polyneuropathy  -     traMADoL (ULTRAM) 50 mg tablet; Take 1 tablet (50 mg total) by mouth every 12 (twelve) hours. for 7 days  -     gabapentin (NEURONTIN) 300 MG capsule; " Take 1 capsule (300 mg total) by mouth 2 (two) times daily.    Gastroesophageal reflux disease, unspecified whether esophagitis present  -     continue pantoprazole    RUSH (obstructive sleep apnea)  -     continue nighttime CPAP  -     Ambulatory referral/consult to Pulmonology; Future    Hypertension, unspecified type  -     influenza (adjuvanted) (Fluad) 45 mcg/0.5 mL IM vaccine (> or = 66 yo) 0.5 mL    Hyperlipidemia, unspecified hyperlipidemia type  -    continue rosuostatin    Osteoporosis, unspecified osteoporosis type, unspecified pathological fracture presence  -     DXA Bone Density Axial Skeleton 1 or more sites; Future  -  Asthma, unspecified asthma severity, unspecified whether complicated, unspecified whether persistent  -     influenza (adjuvanted) (Fluad) 45 mcg/0.5 mL IM vaccine (> or = 66 yo) 0.5 mL  -     Ambulatory referral/consult to Pulmonology; Future  -     continue albuterol    Need for vaccination  -     Influenza - Trivalent (Adjuvanted)                      RTC in 6 months    Case discussed with Dr Prieto Aponte MD  Internal Medicine PGY2  [facility]  [address] [place] [address] [id number] [phone]

## 2025-01-07 NOTE — TELEPHONE ENCOUNTER
New patient glaucoma evaluation scheduled with Dr. Light 03/13/2025 at 8:45 am. Patient previously followed by Dr. Devante Willard in [place], [place] ([phone]).

## 2025-01-07 NOTE — TELEPHONE ENCOUNTER
----- Message from Med Assistant Iris sent at 1/7/2025  3:59 PM CST -----  Dr.Sameera Aponte Put A Referral In For Patient To Schedule With Ophthalmology     Please Call And Assist Patient With Scheduling    Thank You      Status post cataract extraction, unspecified laterality [Z98.49]   No

## 2025-01-08 ENCOUNTER — TELEPHONE (OUTPATIENT)
Dept: VASCULAR SURGERY | Facility: CLINIC | Age: 75
End: 2025-01-08
Payer: MEDICARE

## 2025-01-08 NOTE — TELEPHONE ENCOUNTER
Contacted pt to notify her that appt with Dr. Blackburn is not needed per referring doctor Dr. Aponte and recounted previous messages with Dr. Aponte. Pt verbalized understanding. Appt cancelled.

## 2025-01-08 NOTE — TELEPHONE ENCOUNTER
Tisha Aponte MD Estay, Megan, RN  Caller: Unspecified (Yesterday,  4:56 PM)  Thank you for this, my bad! No need for another vascular referral    Have a good day    Dr. Aponte          Previous Messages       ----- Message -----  From: Leydi Benoit RN  Sent: 1/8/2025  10:04 AM CST  To: MD Dr. Fay Gray,  I see that you entered a referral to vascular surgery yesterday for Mrs. Lui. Your note states that she had not been scheduled for any of the referrals you had previously entered including the referral to vascular surgery. However, Mrs. Lui was seen in clinic by Dr. Adams on 9/3/24 and he determined that there was not evidence of carotid artery stenosis based on the ultrasound performed in our vascular lab which was consistent with her previous imaging done in [place]. He recommend she continue DAPT and a statin and CC'd you on his note. Do you feel there is any further need to be seen by a vascular surgeon, or was this an oversight?  Leydi Benoit RN  ----- Message -----  From: Leydi Benoit RN  Sent: 1/7/2025   4:56 PM CST  To: Leydi Benoit RN

## 2025-01-08 NOTE — PROGRESS NOTES
Reviewed documentation of history, physical, assessment, and plan.  The documentation of the history and physical agrees with the residents presentation.  The assessment and plan reflects my conversation with the resident regarding the care and management of this patient.

## 2025-01-09 ENCOUNTER — TELEPHONE (OUTPATIENT)
Dept: INTERNAL MEDICINE | Facility: CLINIC | Age: 75
End: 2025-01-09
Payer: MEDICARE

## 2025-01-09 NOTE — TELEPHONE ENCOUNTER
----- Message from Danis sent at 1/9/2025  1:30 PM CST -----  Contact: Pt  [phone]  Requesting an RX refill or new RX.    Is this a refill or new RX: Refill    RX name and strength (copy/paste from chart):  allopurinoL (ZYLOPRIM) 100 MG tablet    Is this a 30 day or 90 day RX: 90    Pharmacy name and phone # (copy/paste from chart):  [facility]   Phone: [phone]  Fax: [phone]          The doctors have asked that we provide their patients with the following 2 reminders -- prescription refills can take up to 72 hours, and a friendly reminder that in the future you can use your [facility] account to request refills: yes

## 2025-01-14 RX ORDER — ALLOPURINOL 100 MG/1
100 TABLET ORAL DAILY
Qty: 30 TABLET | Refills: 2 | Status: SHIPPED | OUTPATIENT
Start: 2025-01-14 | End: 2025-01-30 | Stop reason: SDUPTHER

## 2025-01-25 DIAGNOSIS — M25.561 RIGHT KNEE PAIN, UNSPECIFIED CHRONICITY: ICD-10-CM

## 2025-01-25 DIAGNOSIS — Z96.651 S/P TOTAL KNEE REPLACEMENT, RIGHT: ICD-10-CM

## 2025-01-27 ENCOUNTER — OFFICE VISIT (OUTPATIENT)
Dept: ORTHOPEDICS | Facility: CLINIC | Age: 75
End: 2025-01-27
Payer: MEDICARE

## 2025-01-27 ENCOUNTER — HOSPITAL ENCOUNTER (OUTPATIENT)
Dept: RADIOLOGY | Facility: HOSPITAL | Age: 75
Discharge: HOME | End: 2025-01-27
Attending: ORTHOPAEDIC SURGERY
Payer: MEDICARE

## 2025-01-27 VITALS — BODY MASS INDEX: 36.73 KG/M2 | WEIGHT: 248 LBS | HEIGHT: 69 IN

## 2025-01-27 DIAGNOSIS — Z96.651 S/P TOTAL KNEE REPLACEMENT, RIGHT: Primary | ICD-10-CM

## 2025-01-27 DIAGNOSIS — Z96.651 S/P TOTAL KNEE REPLACEMENT, RIGHT: ICD-10-CM

## 2025-01-27 DIAGNOSIS — R60.0 LOCALIZED EDEMA: ICD-10-CM

## 2025-01-27 PROCEDURE — 99999 PR PBB SHADOW E&M-EST. PATIENT-LVL III: CPT | Mod: PBBFAC,,, | Performed by: ORTHOPAEDIC SURGERY

## 2025-01-27 PROCEDURE — 73560 X-RAY EXAM OF KNEE 1 OR 2: CPT | Mod: 26,59,LT, | Performed by: RADIOLOGY

## 2025-01-27 PROCEDURE — 1101F PT FALLS ASSESS-DOCD LE1/YR: CPT | Mod: CPTII,S$GLB,, | Performed by: ORTHOPAEDIC SURGERY

## 2025-01-27 PROCEDURE — 1159F MED LIST DOCD IN RCRD: CPT | Mod: CPTII,S$GLB,, | Performed by: ORTHOPAEDIC SURGERY

## 2025-01-27 PROCEDURE — 73560 X-RAY EXAM OF KNEE 1 OR 2: CPT | Mod: TC,LT

## 2025-01-27 PROCEDURE — 73562 X-RAY EXAM OF KNEE 3: CPT | Mod: 26,RT,, | Performed by: RADIOLOGY

## 2025-01-27 PROCEDURE — 99024 POSTOP FOLLOW-UP VISIT: CPT | Mod: S$GLB,,, | Performed by: ORTHOPAEDIC SURGERY

## 2025-01-27 PROCEDURE — 3288F FALL RISK ASSESSMENT DOCD: CPT | Mod: CPTII,S$GLB,, | Performed by: ORTHOPAEDIC SURGERY

## 2025-01-27 RX ORDER — METHYLPREDNISOLONE 4 MG/1
4 TABLET ORAL DAILY
Qty: 21 TABLET | Refills: 0 | Status: SHIPPED | OUTPATIENT
Start: 2025-01-27 | End: 2025-02-26

## 2025-01-27 NOTE — PROGRESS NOTES
Sandra Lui is in for 3 month follow up for a  right TKA.  She is doing fairly well.  We will knee pain with radiation down her leg.    She has completed PT  Exam demonstrates  A well developed female in no distress.  Alert and oriented.  Mood and affect are appropriate.    Knee incision is well healed.  ROM is 0-100.  The patella tracks well and there is no instability. The extremity is neurovascularly intact. 2+ edemaR/!+ edema left    Xrays demonstrate a well fixed and positioned prosthesis.         No data to display                     No data to display                     No data to display                    10/21/2024     9:40 AM   Knee Society and Function Score   FINDINGS - KNEE SOCIETY SCORE 39   FINDINGS - KNEE SOCIETY FUNCTION SCORE 45            No data to display                Imp: Progressing  Will try medrol dose pack to relieve inflammation.  F/U in 6 weeks

## 2025-01-29 ENCOUNTER — HOSPITAL ENCOUNTER (OUTPATIENT)
Dept: RADIOLOGY | Facility: CLINIC | Age: 75
Discharge: HOME | End: 2025-01-29
Payer: MEDICARE

## 2025-01-29 ENCOUNTER — OFFICE VISIT (OUTPATIENT)
Dept: NEUROLOGY | Facility: CLINIC | Age: 75
End: 2025-01-29
Payer: MEDICARE

## 2025-01-29 VITALS
HEART RATE: 77 BPM | BODY MASS INDEX: 37.59 KG/M2 | WEIGHT: 248 LBS | SYSTOLIC BLOOD PRESSURE: 103 MMHG | DIASTOLIC BLOOD PRESSURE: 68 MMHG | HEIGHT: 68 IN

## 2025-01-29 DIAGNOSIS — R60.9 EDEMA, UNSPECIFIED TYPE: Primary | ICD-10-CM

## 2025-01-29 DIAGNOSIS — E66.01 SEVERE OBESITY (BMI 35.0-39.9) WITH COMORBIDITY: Primary | ICD-10-CM

## 2025-01-29 DIAGNOSIS — M81.0 OSTEOPOROSIS, UNSPECIFIED OSTEOPOROSIS TYPE, UNSPECIFIED PATHOLOGICAL FRACTURE PRESENCE: ICD-10-CM

## 2025-01-29 DIAGNOSIS — G45.9 TIA (TRANSIENT ISCHEMIC ATTACK): ICD-10-CM

## 2025-01-29 PROCEDURE — 3288F FALL RISK ASSESSMENT DOCD: CPT | Mod: CPTII,S$GLB,, | Performed by: STUDENT IN AN ORGANIZED HEALTH CARE EDUCATION/TRAINING PROGRAM

## 2025-01-29 PROCEDURE — 77080 DXA BONE DENSITY AXIAL: CPT | Mod: TC

## 2025-01-29 PROCEDURE — 99215 OFFICE O/P EST HI 40 MIN: CPT | Mod: S$GLB,,, | Performed by: STUDENT IN AN ORGANIZED HEALTH CARE EDUCATION/TRAINING PROGRAM

## 2025-01-29 PROCEDURE — 99999 PR PBB SHADOW E&M-EST. PATIENT-LVL III: CPT | Mod: PBBFAC,,, | Performed by: STUDENT IN AN ORGANIZED HEALTH CARE EDUCATION/TRAINING PROGRAM

## 2025-01-29 PROCEDURE — 1159F MED LIST DOCD IN RCRD: CPT | Mod: CPTII,S$GLB,, | Performed by: STUDENT IN AN ORGANIZED HEALTH CARE EDUCATION/TRAINING PROGRAM

## 2025-01-29 PROCEDURE — 1125F AMNT PAIN NOTED PAIN PRSNT: CPT | Mod: CPTII,S$GLB,, | Performed by: STUDENT IN AN ORGANIZED HEALTH CARE EDUCATION/TRAINING PROGRAM

## 2025-01-29 PROCEDURE — 1100F PTFALLS ASSESS-DOCD GE2>/YR: CPT | Mod: CPTII,S$GLB,, | Performed by: STUDENT IN AN ORGANIZED HEALTH CARE EDUCATION/TRAINING PROGRAM

## 2025-01-29 PROCEDURE — 3078F DIAST BP <80 MM HG: CPT | Mod: CPTII,S$GLB,, | Performed by: STUDENT IN AN ORGANIZED HEALTH CARE EDUCATION/TRAINING PROGRAM

## 2025-01-29 PROCEDURE — 3074F SYST BP LT 130 MM HG: CPT | Mod: CPTII,S$GLB,, | Performed by: STUDENT IN AN ORGANIZED HEALTH CARE EDUCATION/TRAINING PROGRAM

## 2025-01-29 NOTE — PROGRESS NOTES
Subjective:      Patient ID: Sandra Lui is a 75 y.o. female w/ a hx of TIA, depression, CKD, RA/PMR here for establishing care.    Chief Complaint: Disease Management    HPI    Has RA on chart - no documentation or treatment for RA and treatments have included gabapentin and tramadol    Moved here from [place] for her daughter's job.     Had a rheumatologist in [place]. Has been off of medicine for a long time. Was diagnosed with PMR and also has arthritis.     Takes gabapentin 300mg. Was previously on steroid shots when the PMR was bad. Was getting knee injections. Had shoulder injections for PMR. Had just started methylpred yesterday. Was having swelling in her leg because it's post-surgical.     Does get shoulder stiffness. Pain gets worse when using her shoulders. Has a lot of neck and shoulder pain as well.     Has never been on immune.    Shoulder stiffness has been at least 6 years.     Is only able to lift herself with her arms.     No rashes.    Is on steroid drops for eyes for glaucoma, had a lens transplant.     Has dry eye. Sometimes has dry mouth - attributes to c-pap.    Sometimes has swelling in hands. Primarily shoulders/arms and knees.     No sores in the mouth. No raynaud's. No issues with diarrhea or blood in the stool. No history of psoriasis.    Family - sister has lupus.     Surgeries - total knee R 11/2024, broken ankle w/ surgery on the left (17 years ago), gall bladder removal (11 years ago), tonsillectomy (age 34), hysterectomy (age 38)    Hypertension. Also high cholesterol, takes statin. Did also have two TIAs that she's now on ASA/plavix. Last one was last year (2024, 2023).     Lives here in [place], moved for daughter's job. Retired from hospice care. Eats a lot of fruits/vegetables. Not getting exercise. No alcohol, never smoker.     Has been taking allopurinol for 3-4 years and had a gout flare that resolved with colchicine. Had a gout flare last week and took colchicine - has  "since resolved. Has been back on allopurinol for a week.     Review of Systems   Constitutional:  Negative for fever and unexpected weight change.   HENT:  Negative for mouth sores and trouble swallowing.    Eyes:  Negative for redness.   Respiratory:  Negative for cough and shortness of breath.    Cardiovascular:  Negative for chest pain.   Gastrointestinal:  Negative for constipation and diarrhea.   Genitourinary:  Negative for dysuria and genital sores.   Skin:  Negative for rash.   Neurological:  Negative for headaches.   Hematological:  Bruises/bleeds easily.        Objective:   /72   Pulse 69   Ht 5' 8" (1.727 m)   Wt 112.9 kg (248 lb 14.4 oz)   BMI 37.85 kg/m²   Physical Exam   Constitutional: She is oriented to person, place, and time. No distress.   HENT:   Head: Normocephalic and atraumatic.   Cardiovascular: Normal rate, regular rhythm and normal heart sounds.   Pulmonary/Chest: Effort normal and breath sounds normal. No respiratory distress.   Abdominal: Soft.   Musculoskeletal:         General: No tenderness.   Neurological: She is alert and oriented to person, place, and time.   Skin: Skin is warm and dry. No rash noted.   Psychiatric: Her behavior is normal. Judgment and thought content normal.       Right Side Rheumatological Exam     Shoulder Exam     Range of Motion   Active abduction:  abnormal Right shoulder active abduction: 90.  Passive abduction:  abnormal Right shoulder passive abduction: 150.  External Rotation 90 degrees: 30 abnormal    Left Side Rheumatological Exam     Shoulder Exam     Range of Motion   Active abduction:  abnormal Left shoulder active abduction: 45.  Passive abduction:  abnormal Left shoulder passive abduction: 110.  External Rotation 90 degrees: 20 abnormal           1/30/2025   Tender (IRWIN-28) 3 / 28    Swollen (IRWIN-28) 0 / 28    Provider Global --   Patient Global 5 / 100   ESR 29 mm/hr   CRP 0.9 mg/L   IRWIN-28 (ESR) 3.4 (Moderate disease activity)   IRWIN-28 " (CRP) 2.23 (Remission)   CDAI Score --        Assessment/Plan:     1. Gout, unspecified cause, unspecified chronicity, unspecified site        Gout  Pt mentioned having a recent flare and restarting allopurinol/colchicine PRN recently. No uric acid available.   Diagnosis/Important Hx: Last flare a week ago (1/2025) - resolved with colchicine. Restarted allopurinol during that time.   Relevant serologies: Uric acid unavailable  Previous Therapy: allopurinol/colchicine PRN  Current Therapy: allopurinol 100mg daily, colchicine PRN  Imaging/Procedures: No XR available  Other notes:   PLAN  - Uric acid in a week   Adjust allopurinol for goal <6   Can discuss scheduling colchicine  - Complained of some nerve pain that's new, consider podiatry consult  - F/u in 6 mo    Osteoarthritis  Hx of a diagnosis of PMR that was treated with steroid injections in shoulders only - has never been on prednisone. Has been only managed with gabapentin.  Replacement on right knee that took place 11/5/24. Pt appears to have OA of the shoulders/knees as her primary arthritis. Do not believe she has ever had PMR or RA. Current inflammatory markers negative.    Diagnosis/Important Hx: R knee replacement 11/2024, bilat shoulder OA, left knee OA  Relevant serologies: Negative RF/CCP, negative ESR/CRP  Previous Therapy: Has never been on immunomodulating medications  Current Therapy: Gabapentin 300mg BID  Imaging/Procedures:   10/2024   Bilat shoulder XR - moderate bilateral AC arthritis, left GH narrowing worse than right   Bilat hip XR - mild DJD  Other notes:   PLAN  - 1000mg TID extra strength tylenol  - Room to increase gabapentin if needed  - Recommended heat and aspercreme  - Can offer PT for shoulder in the future (currently getting post-op knee therapy)  - Pt does not have PMR or RA, does have gout as above  - F/u in 6 mo      Problem List Items Addressed This Visit          Orthopedic    Gout - Primary    Relevant Medications     allopurinoL (ZYLOPRIM) 100 MG tablet    Other Relevant Orders    URIC ACID     This patient encounter was staffed and the plan was formulated with rheumatology attending Dr. Mosley.    Leydi Boo MD  Rheumatology Fellow, PGY-4

## 2025-01-29 NOTE — PROGRESS NOTES
[facility]  Return Clinic Visit    Patient Name: Sandra Lui  MRN: [id number]  Date: 1/29/25  Referring Provider: Dr. Tisha Aponte    CC: History of TIA    SUBJECTIVE:      History of Present Illness: Sandra Lui is a right-handed 75 y.o. female with a past medical history significant for HTN, HLD, PMR, RUSH who presents to clinic for follow-up regarding history of transient ischemic attack.     She was last seen in [facility] clinic on 09/2024. No new or worsening symptoms since she was last evaluated in clinic. Today, she reports bilateral leg pain and swelling. Sleeps with her legs elevated. Pain gets worse with walking, appears to be due to swelling. She is on DAPT, statin for secondary stroke prevention.     She has a history of TIAs in 2022 and 2023. Symptoms included difficulty speaking and inability to use the remote. She also has a history of 2 concussions. Evaluation with CUS showed no significant stenosis in her carotids bilaterally.     Work-up:    Imaging:  - CTA head and neck (01/18/2022):  1. Brain CTA: No major branch occlusion, flow limiting stenosis, or aneurysm is identified intracranially.   2. Neck CTA: No evidence of hemodynamically significant cervical carotid or vertebral stenosis     - Carotid U/S (12/05/2024):   RIGHT SIDE:   Minimal 1-39% Right Bulb/ICA stenosis.   Heterogeneous plaque noted in the right internal carotid artery.   Antegrade flow noted in the right vertebral artery.     LEFT SIDE:   Normal - No evidence of plaque in the left internal carotid artery.   Antegrade flow in the left vertebral artery.     Cardiac Evaluation:  - ECG (07/05/2024):   Sinus bradycardia with 1st degree A-V block   Otherwise normal ECG     Labs:  Recent Labs   Lab 08/19/24  1546   Hemoglobin A1C 5.7 H   LDL Cholesterol 58.8 L   HDL 47   Triglycerides 111   Cholesterol 128       Review of Systems: The following systems were reviewed with pertinent positives and negatives documented in the  HPI: constitutional, eyes, CV, respiratory, GI, , musculoskeletal, skin, neurological, psychiatric    Past Medical History:  Past Medical History:   Diagnosis Date    Arthritis     Asthma     Depression     Disorder of kidney and ureter     GERD (gastroesophageal reflux disease)     Gout     Hyperlipidemia     Hypertension     Osteoporosis        Medications:    Current Outpatient Medications:     acetaminophen (TYLENOL) 500 MG tablet, Take 500 mg by mouth once daily., Disp: , Rfl:     acetaminophen (TYLENOL) 650 MG TbSR, Take 1 tablet (650 mg total) by mouth every 8 (eight) hours., Disp: 120 tablet, Rfl: 0    albuterol (PROVENTIL/VENTOLIN HFA) 90 mcg/actuation inhaler, Inhale 1-2 puffs into the lungs every 6 (six) hours as needed for Wheezing or Shortness of Breath. Rescue, Disp: 18 g, Rfl: 1    ALBUTEROL, REFILL, INHL, Inhale into the lungs. 2 puffs as needed, Disp: , Rfl:     allopurinoL (ZYLOPRIM) 100 MG tablet, Take 1 tablet (100 mg total) by mouth once daily., Disp: 30 tablet, Rfl: 2    aspirin (ECOTRIN) 81 MG EC tablet, Take 1 tablet (81 mg total) by mouth 2 (two) times a day. Hold plavix for 1 week post op, Disp: 60 tablet, Rfl: 0    buPROPion (WELLBUTRIN XL) 150 MG TB24 tablet, Take 150 mg by mouth once daily., Disp: , Rfl:     calcium carbonate (OS-NOBLE) 600 mg calcium (1,500 mg) Tab, Take 1,200 mg by mouth once daily., Disp: , Rfl:     clobetasoL (TEMOVATE) 0.05 % external solution, Apply 1 mL topically once daily., Disp: , Rfl:     clopidogreL (PLAVIX) 75 mg tablet, Take 1 tablet (75 mg total) by mouth once daily., Disp: 30 tablet, Rfl: 1    colchicine (COLCRYS) 0.6 mg tablet, Take 0.6 mg by mouth daily as needed., Disp: , Rfl:     ergocalciferol, vitamin D2, (VITAMIN D ORAL), Take by mouth once daily., Disp: , Rfl:     famotidine (PEPCID) 40 MG tablet, Take 40 mg by mouth once daily., Disp: , Rfl:     fluorometholone 0.1% (FML) 0.1 % DrpS, Place 1 drop into both eyes Daily., Disp: , Rfl:      fluticasone propionate (FLONASE) 50 mcg/actuation nasal spray, 1 spray (50 mcg total) by Each Nostril route 2 (two) times daily as needed for Rhinitis., Disp: 16 g, Rfl: 1    furosemide (LASIX) 20 MG tablet, Take 1 tablet (20 mg total) by mouth 2 (two) times daily., Disp: 60 tablet, Rfl: 2    gabapentin (NEURONTIN) 300 MG capsule, Take 1 capsule (300 mg total) by mouth 2 (two) times daily., Disp: 120 capsule, Rfl: 0    ipratropium (ATROVENT) 42 mcg (0.06 %) nasal spray, 2 sprays by Each Nostril route as needed., Disp: , Rfl:     latanoprost 0.005 % ophthalmic solution, Place 1 drop into both eyes every evening., Disp: , Rfl:     losartan (COZAAR) 50 MG tablet, Take 1 tablet (50 mg total) by mouth once daily., Disp: 30 tablet, Rfl: 2    methylPREDNISolone (MEDROL DOSEPACK) 4 mg tablet, Take 1 tablet (4 mg total) by mouth once daily. use as directed, Disp: 21 tablet, Rfl: 0    metoprolol succinate (TOPROL-XL) 25 MG 24 hr tablet, Take 25 mg by mouth once daily., Disp: , Rfl:     omega-3 fatty acids/fish oil (FISH OIL-OMEGA-3 FATTY ACIDS) 300-1,000 mg capsule, Take by mouth once daily., Disp: , Rfl:     pantoprazole (PROTONIX) 40 MG tablet, Take 1 tablet (40 mg total) by mouth once daily., Disp: 30 tablet, Rfl: 0    rosuvastatin (CRESTOR) 20 MG tablet, Take 1 tablet (20 mg total) by mouth every evening., Disp: 30 tablet, Rfl: 2    senna-docusate 8.6-50 mg (SENNA WITH DOCUSATE SODIUM) 8.6-50 mg per tablet, Take 1 tablet by mouth once daily., Disp: 30 tablet, Rfl: 0    vitamin D (VITAMIN D3) 1000 units Tab, Take 1,000 Units by mouth once daily., Disp: , Rfl:     Current Facility-Administered Medications:     influenza (adjuvanted) (Fluad) 45 mcg/0.5 mL IM vaccine (> or = 64 yo) 0.5 mL, 0.5 mL, Intramuscular, 1 time in Clinic/HOD,   Any other notable medications as documented in HPI.    Allergies:  Review of patient's allergies indicates:   Allergen Reactions    Adhesive      Skin peeling    Erythromycin      Vomiting,  weakness, nausea    Neosporin (neomycin-polymyx) Rash       Social History:  Social History     Socioeconomic History    Marital status: Single   Tobacco Use    Smoking status: Never    Smokeless tobacco: Never   Substance and Sexual Activity    Alcohol use: No    Drug use: No     Social Drivers of Health     Financial Resource Strain: Patient Declined (11/5/2024)    Overall Financial Resource Strain (CARDIA)     Difficulty of Paying Living Expenses: Patient declined   Recent Concern: Financial Resource Strain - Medium Risk (8/16/2024)    Overall Financial Resource Strain (CARDIA)     Difficulty of Paying Living Expenses: Somewhat hard   Food Insecurity: Patient Declined (11/5/2024)    Hunger Vital Sign     Worried About Running Out of Food in the Last Year: Patient declined     Ran Out of Food in the Last Year: Patient declined   Recent Concern: Food Insecurity - Food Insecurity Present (8/16/2024)    Hunger Vital Sign     Worried About Running Out of Food in the Last Year: Sometimes true     Ran Out of Food in the Last Year: Sometimes true   Transportation Needs: Patient Declined (11/5/2024)    TRANSPORTATION NEEDS     Transportation : Patient declined   Physical Activity: Inactive (8/16/2024)    Exercise Vital Sign     Days of Exercise per Week: 0 days     Minutes of Exercise per Session: 10 min   Stress: Patient Declined (11/5/2024)    [employer] - Occupational Stress Questionnaire     Feeling of Stress : Patient declined   Housing Stability: Patient Declined (11/5/2024)    Housing Stability Vital Sign     Unable to Pay for Housing in the Last Year: Patient declined     Homeless in the Last Year: Patient declined     Any other notable Social History as documented in HPI.    Family History:  No family history on file.  Any other notable FMH as documented in HPI.      OBJECTIVE:     Physical Exam:   Vitals:    01/29/25 0835   BP: 103/68   Pulse: 77     GEN: NAD, pleasant,  cooperative  CV: RRR  PULM: No signs of resp distress, on room air  EXT: No cyanosis, clubbing, or edema.    NEURO:  Mental status: The patient is alert, attentive, and oriented to self, age, month, year  Speech: Fluent speech with intact repetition, comprehension, and naming. Phonation is normal.    Cranial nerves:  CN II: Visual fields are full to confrontation. Pupils are 3mm and reactive to light OU.  CN III, IV, VI: EOMI, no nystagmus, no ptosis  CN V: Facial sensation is intact in all 3 divisions bilaterally.  CN VII: Face is symmetric with normal eye closure and smile.  CN VIII: Hearing is normal bilaterally.  CN IX, X: Palate elevates symmetrically.   CN XI: Head turning and shoulder shrug are intact.  CN XII: Tongue is midline with normal movements and no atrophy.    Motor: Muscle bulk and tone are normal. No pronator drift. 5/5 strength throughout.     Reflexes: Reflexes are 2+ and symmetric at the biceps, triceps, 2+ L knee, absent R knee (h/o knee surgery).     Sensory: Decreased sensation to light touch over lower right leg (chronic, present since her knee surgery)    Coordination: Rapid alternating movements and fine finger movements are intact. There is no dysmetria on finger-to-nose and heel-knee-shin. There are no abnormal or extraneous movements.    Gait/Stance: Posture is normal. Gait is mildly unsteady with antalgic gait 2/2 knee pain. Walks with a cane.       ASSESSMENT/PLAN:     Diagnosis/Etiology: History of episodes of transient neurologic symptoms. Diagnosed with TIA in 2022 and 2023. Imaging completed at that time showed no significant extra- or intracranial stenosis. Her vascular risk factors have otherwise been well-managed. Plan as detailed below.    Stroke Risk Factors: HTN, HLD    Recommendations:   - Antiplatelet/Anticoagulation: From a vascular neurology standpoint, she only needs single antiplatelet therapy (aspirin or plavix). She will discuss this with vascular surgery and  cardiology.    - Lipid Management: Target is LDL < 70mg/dL. Continue rosuvastatin 20mg daily.   - Diabetes: Target hemoglobin A1c <7%, measured 2X/year or quarterly if not meeting goals  - Hypertension: Target systolic BP <130mmHg. At goal today. Continue home BP medications.   - Smoking: Non-smoker  - Weight: Target BMI is <25kg/m2 if BMI is > 25-27 kg/m2; if BMI >27kg/m2 10% weight loss is suggested over next 6 months. Dietary consult is suggested to assist in weight control.  - Therapy: Continue PT as needed.   - Follow-up: RTC as needed. Recommend close follow-up with their PCP for monitoring and management of the above vascular risk factors as needed to meet goals.   - Follow-up with vascular surgery for leg pain swelling and concern for venous insufficiency.     Problem List Items Addressed This Visit          Neuro    TIA (transient ischemic attack)       Endocrine    Severe obesity (BMI 35.0-39.9) with comorbidity - Primary     40 minutes of total time spent on the encounter, which includes face to face time and non-face to face time preparing to see the patient (eg, review of tests), obtaining and/or reviewing separately obtained history, documenting clinical information in the electronic or other health record, independently interpreting results (not separately reported), and communicating results to the patient/family/caregiver, patient/family education, and care coordination (not separately reported).     Jihan Todd MD, PhD  [facility]  Division of Vascular Neurology

## 2025-01-30 ENCOUNTER — OFFICE VISIT (OUTPATIENT)
Dept: RHEUMATOLOGY | Facility: CLINIC | Age: 75
End: 2025-01-30
Payer: MEDICARE

## 2025-01-30 VITALS
WEIGHT: 248.88 LBS | DIASTOLIC BLOOD PRESSURE: 72 MMHG | HEIGHT: 68 IN | SYSTOLIC BLOOD PRESSURE: 114 MMHG | HEART RATE: 69 BPM | BODY MASS INDEX: 37.72 KG/M2

## 2025-01-30 DIAGNOSIS — M10.9 GOUT, UNSPECIFIED CAUSE, UNSPECIFIED CHRONICITY, UNSPECIFIED SITE: Primary | ICD-10-CM

## 2025-01-30 PROCEDURE — 3078F DIAST BP <80 MM HG: CPT | Mod: CPTII,GC,S$GLB, | Performed by: STUDENT IN AN ORGANIZED HEALTH CARE EDUCATION/TRAINING PROGRAM

## 2025-01-30 PROCEDURE — 3288F FALL RISK ASSESSMENT DOCD: CPT | Mod: CPTII,GC,S$GLB, | Performed by: STUDENT IN AN ORGANIZED HEALTH CARE EDUCATION/TRAINING PROGRAM

## 2025-01-30 PROCEDURE — 1159F MED LIST DOCD IN RCRD: CPT | Mod: CPTII,GC,S$GLB, | Performed by: STUDENT IN AN ORGANIZED HEALTH CARE EDUCATION/TRAINING PROGRAM

## 2025-01-30 PROCEDURE — 99999 PR PBB SHADOW E&M-EST. PATIENT-LVL V: CPT | Mod: PBBFAC,GC,, | Performed by: STUDENT IN AN ORGANIZED HEALTH CARE EDUCATION/TRAINING PROGRAM

## 2025-01-30 PROCEDURE — 99204 OFFICE O/P NEW MOD 45 MIN: CPT | Mod: GC,S$GLB,, | Performed by: STUDENT IN AN ORGANIZED HEALTH CARE EDUCATION/TRAINING PROGRAM

## 2025-01-30 PROCEDURE — 1125F AMNT PAIN NOTED PAIN PRSNT: CPT | Mod: CPTII,GC,S$GLB, | Performed by: STUDENT IN AN ORGANIZED HEALTH CARE EDUCATION/TRAINING PROGRAM

## 2025-01-30 PROCEDURE — 3074F SYST BP LT 130 MM HG: CPT | Mod: CPTII,GC,S$GLB, | Performed by: STUDENT IN AN ORGANIZED HEALTH CARE EDUCATION/TRAINING PROGRAM

## 2025-01-30 PROCEDURE — 1101F PT FALLS ASSESS-DOCD LE1/YR: CPT | Mod: CPTII,GC,S$GLB, | Performed by: STUDENT IN AN ORGANIZED HEALTH CARE EDUCATION/TRAINING PROGRAM

## 2025-01-30 RX ORDER — ALLOPURINOL 100 MG/1
100 TABLET ORAL DAILY
Qty: 90 TABLET | Refills: 1 | Status: SHIPPED | OUTPATIENT
Start: 2025-01-30

## 2025-01-30 NOTE — PROGRESS NOTES
1/27/2025     6:56 AM   Rapid3 Question Responses and Scores   MDHAQ Score 0.4   Psychologic Score 0   Pain Score 3   When you awakened in the morning OVER THE LAST WEEK, did you feel stiff? Yes   If Yes, please indicate the number of hours until you are as limber as you will be for the day 1   Fatigue Score 0   Global Health Score 0.5   RAPID3 Score 1.61     Answers submitted by the patient for this visit:  Rheumatology Questionnaire (Submitted on 1/27/2025)  fever: No  eye redness: No  mouth sores: No  headaches: No  shortness of breath: No  chest pain: No  trouble swallowing: No  diarrhea: No  constipation: No  unexpected weight change: No  genital sore: No  dysuria: No  During the last 3 days, have you had a skin rash?: No  Bruises or bleeds easily: Yes  cough: No

## 2025-01-30 NOTE — PATIENT INSTRUCTIONS
It was nice to meet you today!    We think you are primarily suffering with shoulder osteoarthritis. Continue to take tylenol, you can take 1000mg of extra strength up to three times a day. Continue to take the gabapentin. We also recommend heat and topicals like Aspercreme.     We will have you get a uric acid level in a week to see if we need to increase the allopurinol. I will let you know based on that level if we need to go up int he dose.     Leydi Boo MD  Rheumatology Fellow, PGY-4

## 2025-02-02 NOTE — PROGRESS NOTES
I have reviewed the notes, assessments, and/or procedures performed this visit, and I concur with the documentation.  She carries a diagnosis of polymyalgia rheumatica but has never been on steroids.  She has been on gabapentin I suspect her disease is fibromyalgia.  She also carries a diagnosis of gout that has never been confirmed by joint aspiration.  I discussed treatment options with the fellow.

## 2025-02-05 RX ORDER — CLOPIDOGREL BISULFATE 75 MG/1
75 TABLET ORAL DAILY
Qty: 30 TABLET | Refills: 1 | Status: SHIPPED | OUTPATIENT
Start: 2025-02-05

## 2025-02-11 ENCOUNTER — LAB VISIT (OUTPATIENT)
Dept: LAB | Facility: HOSPITAL | Age: 75
End: 2025-02-11
Payer: MEDICARE

## 2025-02-11 DIAGNOSIS — M10.9 GOUT, UNSPECIFIED CAUSE, UNSPECIFIED CHRONICITY, UNSPECIFIED SITE: ICD-10-CM

## 2025-02-11 LAB — URATE SERPL-MCNC: 6.2 MG/DL (ref 2.4–5.7)

## 2025-02-11 PROCEDURE — 36415 COLL VENOUS BLD VENIPUNCTURE: CPT | Performed by: STUDENT IN AN ORGANIZED HEALTH CARE EDUCATION/TRAINING PROGRAM

## 2025-02-11 PROCEDURE — 84550 ASSAY OF BLOOD/URIC ACID: CPT | Performed by: STUDENT IN AN ORGANIZED HEALTH CARE EDUCATION/TRAINING PROGRAM

## 2025-02-12 ENCOUNTER — PATIENT MESSAGE (OUTPATIENT)
Dept: RHEUMATOLOGY | Facility: CLINIC | Age: 75
End: 2025-02-12
Payer: MEDICARE

## 2025-02-12 DIAGNOSIS — M10.9 GOUT, UNSPECIFIED CAUSE, UNSPECIFIED CHRONICITY, UNSPECIFIED SITE: ICD-10-CM

## 2025-02-12 RX ORDER — ALLOPURINOL 100 MG/1
150 TABLET ORAL DAILY
Qty: 135 TABLET | Refills: 1 | Status: SHIPPED | OUTPATIENT
Start: 2025-02-12

## 2025-02-13 ENCOUNTER — PATIENT MESSAGE (OUTPATIENT)
Dept: RHEUMATOLOGY | Facility: CLINIC | Age: 75
End: 2025-02-13
Payer: MEDICARE

## 2025-02-17 ENCOUNTER — PATIENT MESSAGE (OUTPATIENT)
Dept: RHEUMATOLOGY | Facility: CLINIC | Age: 75
End: 2025-02-17
Payer: MEDICARE

## 2025-03-06 RX ORDER — ROSUVASTATIN CALCIUM 20 MG/1
20 TABLET, COATED ORAL NIGHTLY
Qty: 30 TABLET | Refills: 2 | Status: SHIPPED | OUTPATIENT
Start: 2025-03-06

## 2025-03-08 RX ORDER — GABAPENTIN 300 MG/1
300 CAPSULE ORAL 2 TIMES DAILY
Qty: 120 CAPSULE | Refills: 0 | Status: CANCELLED | OUTPATIENT
Start: 2025-03-08

## 2025-03-10 RX ORDER — ROSUVASTATIN CALCIUM 20 MG/1
20 TABLET, COATED ORAL NIGHTLY
Qty: 30 TABLET | Refills: 2 | Status: SHIPPED | OUTPATIENT
Start: 2025-03-10

## 2025-03-10 RX ORDER — GABAPENTIN 300 MG/1
300 CAPSULE ORAL 2 TIMES DAILY
Qty: 120 CAPSULE | Refills: 0 | Status: SHIPPED | OUTPATIENT
Start: 2025-03-10

## 2025-03-12 ENCOUNTER — TELEPHONE (OUTPATIENT)
Dept: INTERNAL MEDICINE | Facility: CLINIC | Age: 75
End: 2025-03-12
Payer: MEDICARE

## 2025-03-12 NOTE — TELEPHONE ENCOUNTER
----- Message from Marybeth sent at 3/11/2025  9:39 AM CDT -----  Regarding: Medicartion  Contact: Pt  Patient requesting to speak with you regarding Medication  gabapentin (NEURONTIN) 300 MG capsule, Patient states she got a message stating it was DeniedPlease advise.Phone  [phone] Thank You

## 2025-03-13 ENCOUNTER — OFFICE VISIT (OUTPATIENT)
Dept: OPHTHALMOLOGY | Facility: CLINIC | Age: 75
End: 2025-03-13
Payer: MEDICARE

## 2025-03-13 ENCOUNTER — CLINICAL SUPPORT (OUTPATIENT)
Dept: OPHTHALMOLOGY | Facility: CLINIC | Age: 75
End: 2025-03-13
Payer: MEDICARE

## 2025-03-13 DIAGNOSIS — H40.012 OAG (OPEN ANGLE GLAUCOMA) SUSPECT, LOW RISK, LEFT: ICD-10-CM

## 2025-03-13 DIAGNOSIS — H40.011 OAG (OPEN ANGLE GLAUCOMA) SUSPECT, LOW RISK, RIGHT: ICD-10-CM

## 2025-03-13 DIAGNOSIS — Z96.1 PSEUDOPHAKIA OF BOTH EYES: ICD-10-CM

## 2025-03-13 DIAGNOSIS — Z13.5 GLAUCOMA SCREENING: Primary | ICD-10-CM

## 2025-03-13 DIAGNOSIS — H43.393 VITREOUS FLOATERS OF BOTH EYES: ICD-10-CM

## 2025-03-13 DIAGNOSIS — H18.513 FUCHS' CORNEAL DYSTROPHY OF BOTH EYES: ICD-10-CM

## 2025-03-13 DIAGNOSIS — H52.7 REFRACTIVE ERROR: ICD-10-CM

## 2025-03-13 PROCEDURE — 92133 CPTRZD OPH DX IMG PST SGM ON: CPT | Mod: S$GLB,,, | Performed by: STUDENT IN AN ORGANIZED HEALTH CARE EDUCATION/TRAINING PROGRAM

## 2025-03-13 PROCEDURE — 92020 GONIOSCOPY: CPT | Mod: S$GLB,,, | Performed by: STUDENT IN AN ORGANIZED HEALTH CARE EDUCATION/TRAINING PROGRAM

## 2025-03-13 PROCEDURE — 76514 ECHO EXAM OF EYE THICKNESS: CPT | Mod: S$GLB,,, | Performed by: STUDENT IN AN ORGANIZED HEALTH CARE EDUCATION/TRAINING PROGRAM

## 2025-03-13 PROCEDURE — 1101F PT FALLS ASSESS-DOCD LE1/YR: CPT | Mod: CPTII,S$GLB,, | Performed by: STUDENT IN AN ORGANIZED HEALTH CARE EDUCATION/TRAINING PROGRAM

## 2025-03-13 PROCEDURE — 3288F FALL RISK ASSESSMENT DOCD: CPT | Mod: CPTII,S$GLB,, | Performed by: STUDENT IN AN ORGANIZED HEALTH CARE EDUCATION/TRAINING PROGRAM

## 2025-03-13 PROCEDURE — 1126F AMNT PAIN NOTED NONE PRSNT: CPT | Mod: CPTII,S$GLB,, | Performed by: STUDENT IN AN ORGANIZED HEALTH CARE EDUCATION/TRAINING PROGRAM

## 2025-03-13 PROCEDURE — 99204 OFFICE O/P NEW MOD 45 MIN: CPT | Mod: S$GLB,,, | Performed by: STUDENT IN AN ORGANIZED HEALTH CARE EDUCATION/TRAINING PROGRAM

## 2025-03-13 PROCEDURE — 92083 EXTENDED VISUAL FIELD XM: CPT | Mod: S$GLB,,, | Performed by: STUDENT IN AN ORGANIZED HEALTH CARE EDUCATION/TRAINING PROGRAM

## 2025-03-13 PROCEDURE — 1159F MED LIST DOCD IN RCRD: CPT | Mod: CPTII,S$GLB,, | Performed by: STUDENT IN AN ORGANIZED HEALTH CARE EDUCATION/TRAINING PROGRAM

## 2025-03-13 PROCEDURE — 99999 PR PBB SHADOW E&M-EST. PATIENT-LVL III: CPT | Mod: PBBFAC,,, | Performed by: STUDENT IN AN ORGANIZED HEALTH CARE EDUCATION/TRAINING PROGRAM

## 2025-03-13 NOTE — PROGRESS NOTES
Subjective:  HPI    Chief complaint: 75 y.o. female presents for glaucoma evaluation. Patient   states she moved from [place] and needed to establish care. Patient   complains of floaters x 2 months OU. Patient states vision gets blurry   from time to time. Denies headaches. Has occasional pain OU, states it   feels like pressure behind eyes.    Past medical history?    HTN    Arthritis   Past ocular history?    Pseudophakia OU    Glaucoma history:  Diagnosed with glaucoma when? Patient was never diagnosed with glaucoma   Hx eye surgery? PCIOL OU, 3/14/13 OD, 3/28/13 OS  Hx eye lasers? none  History of low blood pressure? none  History of migraines?  none  History of blunt trauma to eye? none  History of steroid use? Steroid injections to joints, last injection 1   year ago; was on prednisone about 1 month ago for knee ain, history of   knee replacement   Family history of glaucoma? Father, mother   What is the highest your eye pressure has been? unsure    Eye drops?   Latanoprost qhs OU (has not used in year)   Last edited by Saima Sparrow on 3/13/2025  9:42 AM.        Exam:  See encounter report for full exam    Assessment:  1. Glaucoma screening  - H/o steroid use  - Surg hx:   PCIOL OD 2/2013 [place]  PCIOL OS 3/2013 [facility]   - Laser hx: none  - Glaucoma FHx: mother, father  -  / 639  - Gonio:  ([place] 3/2025)  - Tmax: unknown  - Target IOP: <24  - Med adverse effects: n/a    03/13/2025  IOP adequate off drops, thick CCT  HVF: poorly reliable, non-specific, VFI 96 OD  poorly reliable, non-specific, VFI 98 OS -- baseline  OCT RNFL: full, possible ST blunting c/w OS, avg 86 OD  full, avg 84 OS -- baseline    2. Pseudophakia of both eyes  Good lens position, s/p YAG OU, monitor    3. Fuchs' corneal dystrophy of both eyes  Mild, /639  Monitor    4. Vitreous floaters of both eyes  DFE wnl OU today    5. Refractive error  Has appt with Dr. Raines coming up    Plan:  Healthy appearing ONH with no  cupping. Low clinical suspicion for glaucoma.  - Annuals with optometry    Return me PRN    Licha Light MD  [facility]

## 2025-03-13 NOTE — PROGRESS NOTES
Oct/hvf done ou./ rel/fix fair od good os coop. Good ou./ chart checked for latex allergy./ pirate patch was used for testing./ plano/od plano/os-smh

## 2025-03-17 ENCOUNTER — OFFICE VISIT (OUTPATIENT)
Dept: ORTHOPEDICS | Facility: CLINIC | Age: 75
End: 2025-03-17
Payer: MEDICARE

## 2025-03-17 VITALS — HEIGHT: 68 IN | BODY MASS INDEX: 38.78 KG/M2 | WEIGHT: 255.88 LBS

## 2025-03-17 DIAGNOSIS — Z96.651 S/P TOTAL KNEE REPLACEMENT, RIGHT: Primary | ICD-10-CM

## 2025-03-17 DIAGNOSIS — G89.29 CHRONIC PAIN OF RIGHT KNEE: ICD-10-CM

## 2025-03-17 DIAGNOSIS — M25.561 CHRONIC PAIN OF RIGHT KNEE: ICD-10-CM

## 2025-03-17 PROCEDURE — 1125F AMNT PAIN NOTED PAIN PRSNT: CPT | Mod: CPTII,S$GLB,, | Performed by: ORTHOPAEDIC SURGERY

## 2025-03-17 PROCEDURE — 1159F MED LIST DOCD IN RCRD: CPT | Mod: CPTII,S$GLB,, | Performed by: ORTHOPAEDIC SURGERY

## 2025-03-17 PROCEDURE — 99999 PR PBB SHADOW E&M-EST. PATIENT-LVL III: CPT | Mod: PBBFAC,,, | Performed by: ORTHOPAEDIC SURGERY

## 2025-03-17 PROCEDURE — 3288F FALL RISK ASSESSMENT DOCD: CPT | Mod: CPTII,S$GLB,, | Performed by: ORTHOPAEDIC SURGERY

## 2025-03-17 PROCEDURE — 1101F PT FALLS ASSESS-DOCD LE1/YR: CPT | Mod: CPTII,S$GLB,, | Performed by: ORTHOPAEDIC SURGERY

## 2025-03-17 PROCEDURE — 99213 OFFICE O/P EST LOW 20 MIN: CPT | Mod: S$GLB,,, | Performed by: ORTHOPAEDIC SURGERY

## 2025-03-17 NOTE — PROGRESS NOTES
"Subjective:      Patient ID: Sandra Lui is a 75 y.o. female.    Chief Complaint: Pain of the Right Knee    HPI  Dictation #1  MRN:[id number]  [facility]:[id number]       Objective:      Body mass index is 38.91 kg/m².  Vitals:    25 0920   Weight: 116 kg (255 lb 13.5 oz)   Height: 5' 7.99" (1.727 m)           General    Constitutional: She is oriented to person, place, and time. She appears well-developed and well-nourished.   HENT:   Head: Normocephalic and atraumatic.   Eyes: EOM are normal.   Cardiovascular:  Normal rate.            Pulmonary/Chest: Effort normal.   Neurological: She is alert and oriented to person, place, and time.   Psychiatric: She has a normal mood and affect. Her behavior is normal.           Right Knee Exam     Inspection   Erythema: absent  Scars: present  Swelling: present (slight)  Effusion: absent  Deformity: absent  Bruising: absent    Tenderness   The patient is tender to palpation of the medial retinaculum and lateral retinaculum.    Range of Motion   Extension:  0   Flexion:  110     Tests   Ligament Examination   Lachman: normal (-1 to 2mm)   MCL - Valgus: normal (0 to 2mm)  LCL - Varus: normal    Muscle Strength   Right Lower Extremity   Hip Abduction: 5/5   Quadriceps:  5/5   Hamstrin/5     Vascular Exam       Edema  Right Lower Leg: present (1+)              Assessment:       Encounter Diagnoses   Name Primary?    S/P total knee replacement, right Yes    Chronic pain of right knee           Plan:       Sandra Polk" was seen today for pain.    Diagnoses and all orders for this visit:    S/P total knee replacement, right    Chronic pain of right knee        I expect her to continue to improve with time.  Due to her renal function we are unable to give anti-inflammatory medication.  I will see her back in  with new radiographs.  If she has any increase in symptoms I would need to know about this and she will call and we will get her in sooner.            "

## 2025-03-20 ENCOUNTER — HOSPITAL ENCOUNTER (OUTPATIENT)
Dept: PULMONOLOGY | Facility: CLINIC | Age: 75
Discharge: HOME | End: 2025-03-20
Payer: MEDICARE

## 2025-03-20 ENCOUNTER — OFFICE VISIT (OUTPATIENT)
Dept: PULMONOLOGY | Facility: CLINIC | Age: 75
End: 2025-03-20
Payer: MEDICARE

## 2025-03-20 VITALS
SYSTOLIC BLOOD PRESSURE: 118 MMHG | DIASTOLIC BLOOD PRESSURE: 78 MMHG | HEIGHT: 67 IN | WEIGHT: 225 LBS | HEART RATE: 68 BPM | BODY MASS INDEX: 35.31 KG/M2 | OXYGEN SATURATION: 95 %

## 2025-03-20 DIAGNOSIS — J44.9 CHRONIC OBSTRUCTIVE PULMONARY DISEASE, UNSPECIFIED COPD TYPE: ICD-10-CM

## 2025-03-20 DIAGNOSIS — G47.33 OSA (OBSTRUCTIVE SLEEP APNEA): ICD-10-CM

## 2025-03-20 DIAGNOSIS — G47.30 SLEEP APNEA, UNSPECIFIED TYPE: ICD-10-CM

## 2025-03-20 DIAGNOSIS — J30.89 SEASONAL ALLERGIC RHINITIS DUE TO OTHER ALLERGIC TRIGGER: Primary | ICD-10-CM

## 2025-03-20 DIAGNOSIS — J45.909 ASTHMA, UNSPECIFIED ASTHMA SEVERITY, UNSPECIFIED WHETHER COMPLICATED, UNSPECIFIED WHETHER PERSISTENT: ICD-10-CM

## 2025-03-20 PROCEDURE — 3288F FALL RISK ASSESSMENT DOCD: CPT | Mod: CPTII,S$GLB,, | Performed by: INTERNAL MEDICINE

## 2025-03-20 PROCEDURE — 1100F PTFALLS ASSESS-DOCD GE2>/YR: CPT | Mod: CPTII,S$GLB,, | Performed by: INTERNAL MEDICINE

## 2025-03-20 PROCEDURE — 3074F SYST BP LT 130 MM HG: CPT | Mod: CPTII,S$GLB,, | Performed by: INTERNAL MEDICINE

## 2025-03-20 PROCEDURE — 99999 PR PBB SHADOW E&M-EST. PATIENT-LVL V: CPT | Mod: PBBFAC,,, | Performed by: INTERNAL MEDICINE

## 2025-03-20 PROCEDURE — 3078F DIAST BP <80 MM HG: CPT | Mod: CPTII,S$GLB,, | Performed by: INTERNAL MEDICINE

## 2025-03-20 PROCEDURE — 99204 OFFICE O/P NEW MOD 45 MIN: CPT | Mod: 25,S$GLB,, | Performed by: INTERNAL MEDICINE

## 2025-03-20 PROCEDURE — 1126F AMNT PAIN NOTED NONE PRSNT: CPT | Mod: CPTII,S$GLB,, | Performed by: INTERNAL MEDICINE

## 2025-03-20 PROCEDURE — 1159F MED LIST DOCD IN RCRD: CPT | Mod: CPTII,S$GLB,, | Performed by: INTERNAL MEDICINE

## 2025-03-20 RX ORDER — ALBUTEROL SULFATE 90 UG/1
1-2 INHALANT RESPIRATORY (INHALATION) EVERY 6 HOURS PRN
Qty: 18 G | Refills: 11 | Status: SHIPPED | OUTPATIENT
Start: 2025-03-20

## 2025-03-20 RX ORDER — FLUTICASONE PROPIONATE 50 MCG
1 SPRAY, SUSPENSION (ML) NASAL 2 TIMES DAILY PRN
Qty: 16 G | Refills: 5 | Status: SHIPPED | OUTPATIENT
Start: 2025-03-20

## 2025-03-20 NOTE — PROGRESS NOTES
Subjective:       Patient ID: Sandra Lui is a 75 y.o. female.    Chief Complaint: No chief complaint on file.    HPI:   Sandra Lui is a 75 y.o. female new to me who presents for evaluation of copd.    Referred from the ED on 7/5/24. Moved from [place] and carried a dx of COPD.    Lost 70lbs in the last 2 years with diet.     Asthma- Dx after covid 1.5 years ago. Primary complaint is increased sx when she has a URI. Uses albuterol during those times.     + Seasonal allergies, uses flonase and OTC.    R knee replacement 11/2024 w/ no pulm issues post op.    Needs [facility] supplies. Sleep study was in [place] and results are not available for review. Used a local DME.       Exposures:  Work- Worked w/ hospice  Tobacco- Denies  Inhalational Agents- Denies  Mold- Denies  Pets- Dog  Birds- Denies  Hot tubs- Denies       Family History of Lung Cancer: Sister and mother w/ lung ca, both smoked  Family History of Lung Disease: Denies  Childhood History of Lung Disease: Denies    Review of Systems    As above    Social History     Tobacco Use    Smoking status: Never    Smokeless tobacco: Never   Substance Use Topics    Alcohol use: No       Review of patient's allergies indicates:   Allergen Reactions    Adhesive      Skin peeling    Erythromycin      Vomiting, weakness, nausea    Neosporin (neomycin-polymyx) Rash     Past Medical History:   Diagnosis Date    Arthritis     Asthma     Depression     Disorder of kidney and ureter     GERD (gastroesophageal reflux disease)     Gout     Hyperlipidemia     Hypertension     Osteoporosis      Past Surgical History:   Procedure Laterality Date    ANKLE SURGERY      Cataract surgeries both sides around 2017      CHOLECYSTECTOMY      HYSTERECTOMY      TONSILLECTOMY      TOTAL KNEE REPLACEMENT USING COMPUTER NAVIGATION Right 11/5/2024    Procedure: ARTHROPLASTY, KNEE, TOTAL, OCTAVIO COMPUTER-ASSISTED NAVIGATION: RIGHT: SAME DAY;  Surgeon: Sha Duran MD;  Location: [facility] OR;   Service: Orthopedics;  Laterality: Right;    TUBAL LIGATION       Current Outpatient Medications on File Prior to Visit   Medication Sig    acetaminophen (TYLENOL) 650 MG TbSR Take 1 tablet (650 mg total) by mouth every 8 (eight) hours.    ALBUTEROL, REFILL, INHL Inhale into the lungs. 2 puffs as needed    allopurinoL (ZYLOPRIM) 100 MG tablet Take 1.5 tablets (150 mg total) by mouth once daily.    buPROPion (WELLBUTRIN XL) 150 MG TB24 tablet Take 150 mg by mouth once daily.    calcium carbonate (OS-NOBLE) 600 mg calcium (1,500 mg) Tab Take 1,200 mg by mouth once daily.    clobetasoL (TEMOVATE) 0.05 % external solution Apply 1 mL topically once daily.    clopidogreL (PLAVIX) 75 mg tablet Take 1 tablet (75 mg total) by mouth once daily.    colchicine (COLCRYS) 0.6 mg tablet Take 0.6 mg by mouth daily as needed.    ergocalciferol, vitamin D2, (VITAMIN D ORAL) Take by mouth once daily.    famotidine (PEPCID) 40 MG tablet Take 40 mg by mouth once daily.    fluorometholone 0.1% (FML) 0.1 % DrpS Place 1 drop into both eyes Daily.    furosemide (LASIX) 20 MG tablet Take 1 tablet (20 mg total) by mouth 2 (two) times daily.    gabapentin (NEURONTIN) 300 MG capsule Take 1 capsule (300 mg total) by mouth 2 (two) times daily.    ipratropium (ATROVENT) 42 mcg (0.06 %) nasal spray 2 sprays by Each Nostril route as needed.    latanoprost 0.005 % ophthalmic solution Place 1 drop into both eyes every evening.    losartan (COZAAR) 50 MG tablet Take 1 tablet (50 mg total) by mouth once daily.    metoprolol succinate (TOPROL-XL) 25 MG 24 hr tablet Take 25 mg by mouth once daily.    omega-3 fatty acids/fish oil (FISH OIL-OMEGA-3 FATTY ACIDS) 300-1,000 mg capsule Take by mouth once daily.    rosuvastatin (CRESTOR) 20 MG tablet Take 1 tablet (20 mg total) by mouth every evening.    vitamin D (VITAMIN D3) 1000 units Tab Take 1,000 Units by mouth once daily.    aspirin (ECOTRIN) 81 MG EC tablet Take 1 tablet (81 mg total) by mouth 2 (two)  "times a day. Hold plavix for 1 week post op     Current Facility-Administered Medications on File Prior to Visit   Medication    influenza (adjuvanted) (Fluad) 45 mcg/0.5 mL IM vaccine (> or = 64 yo) 0.5 mL       Objective:      Vitals:    25 0900   BP: 118/78   BP Location: Right arm   Patient Position: Sitting   Pulse: 68   SpO2: 95%   Weight: 102.1 kg (225 lb)   Height: 5' 7" (1.702 m)     Physical Exam   Constitutional: She appears well-developed and well-nourished.   Neck: No tracheal deviation present.   Cardiovascular: Normal rate and regular rhythm.   Pulmonary/Chest: Normal expansion, symmetric chest wall expansion, effort normal and breath sounds normal. No respiratory distress. She has no wheezes. She has no rhonchi. She has no rales.   Abdominal: She exhibits no distension.   Musculoskeletal:         General: Edema present.      Cervical back: Neck supple.   Lymphadenopathy: No supraclavicular adenopathy is present.     She has no cervical adenopathy.   Skin: Skin is warm and dry. No cyanosis or erythema. Nails show no clubbing.   Nursing note and vitals reviewed.      Personal Diagnostic Review    Laboratory:  24  Bicarb- 26  Eosinophils- 0.1      CT Chest 22- OSH, read only  1.  No pulmonary embolus.     2.  No acute abnormality.       PFTs   3/20/25  FVC: 2.69 (104.2 % predicted), FEV1: 2.14 (108.6 % predicted), FEV1/FVC:  80, T.88 (87.1 % predicted), DLCO: 18.17 (79.9 % predicted)    TTE 22        Assessment:     1. Seasonal allergic rhinitis due to other allergic trigger  -     fluticasone propionate (FLONASE) 50 mcg/actuation nasal spray; spray 1 spray (50 mcg total) by Each Nostril route 2 (two) times daily as needed for Rhinitis.  Dispense: 16 g; Refill: 5    2. Chronic obstructive pulmonary disease, unspecified COPD type  -     Ambulatory referral/consult to Pulmonology    3. [facility] (obstructive sleep apnea)  -     Ambulatory referral/consult to Pulmonology  -     " Ambulatory referral/consult to Sleep Disorders; Future; Expected date: 03/27/2025  -     CPAP/BIPAP SUPPLIES    4. Asthma, unspecified asthma severity, unspecified whether complicated, unspecified whether persistent  Assessment & Plan:  Only requires inhaler therapy w/ exacerbations. PFTs nl today.    Cont prn albuterol  Allergen avoidance  Encouraged regular, progressive exercise    Orders:  -     Ambulatory referral/consult to Pulmonology  -     albuterol (PROVENTIL/VENTOLIN HFA) 90 mcg/actuation inhaler; Inhale 1-2 puffs into the lungs every 6 (six) hours as needed for Wheezing or Shortness of Breath. Rescue  Dispense: 18 g; Refill: 11    5. Sleep apnea, unspecified type  Assessment & Plan:  She will fax over sleep study so we can send to [facility] along w/ supplies.     Sleep referral placed           45 minutes of total time spent on the encounter, which includes face to face time and non-face to face time preparing to see the patient (eg, review of tests), Obtaining and/or reviewing separately obtained history, Documenting clinical information in the electronic or other health record, Independently interpreting results (not separately reported) and communicating results to the patient/family/caregiver, or Care coordination (not separately reported).

## 2025-03-21 LAB
DLCO SINGLE BREATH LLN: 17.01
DLCO SINGLE BREATH PRE REF: 79.9 %
DLCO SINGLE BREATH REF: 22.74
DLCOC SBVA LLN: 2.81
DLCOC SBVA REF: 4.05
DLCOC SINGLE BREATH LLN: 17.01
DLCOC SINGLE BREATH REF: 22.74
DLCOCSBVAULN: 5.3
DLCOCSINGLEBREATHULN: 28.47
DLCOSINGLEBREATHULN: 28.47
DLCOSINGLEBREATHZSCORE: -1.31
DLCOVA LLN: 2.81
DLCOVA PRE REF: 98.4 %
DLCOVA PRE: 3.99 ML/(MIN*MMHG*L) (ref 2.81–5.3)
DLCOVA REF: 4.05
DLCOVAULN: 5.3
ERV LLN: -16449.41
ERV PRE REF: 73.9 %
ERV REF: 0.59
ERVULN: ABNORMAL
FEF 25 75 LLN: 1.06
FEF 25 75 PRE REF: 83.7 %
FEF 25 75 REF: 2.46
FEV05 LLN: 0.94
FEV05 REF: 1.8
FEV1 FVC LLN: 63
FEV1 FVC PRE REF: 103 %
FEV1 FVC REF: 77
FEV1 LLN: 1.33
FEV1 PRE REF: 108.6 %
FEV1 REF: 1.97
FEV1FVCZSCORE: 0.3
FEV1ZSCORE: 0.45
FRCPLETH LLN: 2.12
FRCPLETH PREREF: 83.5 %
FRCPLETH REF: 2.95
FRCPLETHULN: 3.77
FVC LLN: 1.77
FVC PRE REF: 104.2 %
FVC REF: 2.58
FVCZSCORE: 0.21
IVC PRE: 2.86 L (ref 1.77–3.44)
IVC SINGLE BREATH LLN: 1.77
IVC SINGLE BREATH PRE REF: 110.9 %
IVC SINGLE BREATH REF: 2.58
IVCSINGLEBREATHULN: 3.44
LLN IC: -16447.67
PEF LLN: 2.63
PEF PRE REF: 110.9 %
PEF REF: 4.89
PHYSICIAN COMMENT: ABNORMAL
PRE DLCO: 18.17 ML/(MIN*MMHG) (ref 17.01–28.47)
PRE ERV: 0.43 L (ref -16449.41–16450.59)
PRE FEF 25 75: 2.06 L/S (ref 1.06–3.86)
PRE FET 100: 6.39 SEC
PRE FEV05 REF: 98.4 %
PRE FEV1 FVC: 79.54 % (ref 63.45–89.2)
PRE FEV1: 2.14 L (ref 1.33–2.57)
PRE FEV5: 1.77 L (ref 0.94–2.65)
PRE FRC PL: 2.46 L (ref 2.12–3.77)
PRE FVC: 2.69 L (ref 1.77–3.44)
PRE IC: 2.43 L (ref -16447.67–16452.33)
PRE PEF: 5.42 L/S (ref 2.63–7.15)
PRE REF IC: 103.9 %
PRE RV: 2.02 L (ref 1.78–2.93)
PRE TLC: 4.88 L (ref 4.62–6.6)
RAW PRE REF: 145 %
RAW PRE: 4.43 CMH2O*S/L (ref 3.06–3.06)
RAW REF: 3.06
REF IC: 2.33
RV LLN: 1.78
RV PRE REF: 85.9 %
RV REF: 2.36
RVTLC LLN: 36
RVTLC PRE REF: 91.8 %
RVTLC PRE: 41.46 % (ref 35.55–54.73)
RVTLC REF: 45
RVTLCULN: 55
RVULN: 2.93
SGAW PRE REF: 78.8 %
SGAW PRE: 0.08 1/(CMH2O*S) (ref 0.1–0.1)
SGAW REF: 0.1
TLC LLN: 4.62
TLC PRE REF: 87.1 %
TLC REF: 5.61
TLC ULN: 6.6
TLCZSCORE: -1.21
ULN IC: ABNORMAL
VA PRE: 4.55 L (ref 5.46–5.46)
VA SINGLE BREATH LLN: 5.46
VA SINGLE BREATH PRE REF: 83.4 %
VA SINGLE BREATH REF: 5.46
VASINGLEBREATHULN: 5.46
VC LLN: 1.77
VC PRE REF: 110.9 %
VC PRE: 2.86 L (ref 1.77–3.44)
VC REF: 2.58
VC ULN: 3.44

## 2025-03-25 ENCOUNTER — TELEPHONE (OUTPATIENT)
Dept: PULMONOLOGY | Facility: CLINIC | Age: 75
End: 2025-03-25
Payer: MEDICARE

## 2025-03-25 NOTE — TELEPHONE ENCOUNTER
I spoke to patient to let her know I did received fax of her sleep study on Monday, 3/24/25. I faxed Dr Chong's order for CPAP/BiPAP to [facility] along with clinic note and sleep study on Monday, 3/24/25 and received confirmation of fax at 9:06am. Patient verbalized understanding.       Sent sleep study down to be scanned into medical records.

## 2025-03-31 NOTE — ASSESSMENT & PLAN NOTE
She will fax over sleep study so we can send to [facility] along w/ supplies.     Sleep referral placed

## 2025-03-31 NOTE — ASSESSMENT & PLAN NOTE
Only requires inhaler therapy w/ exacerbations. PFTs nl today.    Cont prn albuterol  Allergen avoidance  Encouraged regular, progressive exercise

## 2025-04-08 RX ORDER — FUROSEMIDE 20 MG/1
20 TABLET ORAL 2 TIMES DAILY
Qty: 60 TABLET | Refills: 2 | Status: SHIPPED | OUTPATIENT
Start: 2025-04-08

## 2025-04-22 DIAGNOSIS — I87.2 VENOUS INSUFFICIENCY (CHRONIC) (PERIPHERAL): ICD-10-CM

## 2025-04-22 DIAGNOSIS — M79.606 PAIN AND SWELLING OF LOWER EXTREMITY, UNSPECIFIED LATERALITY: Primary | ICD-10-CM

## 2025-04-22 DIAGNOSIS — M79.89 PAIN AND SWELLING OF LOWER EXTREMITY, UNSPECIFIED LATERALITY: Primary | ICD-10-CM

## 2025-04-22 RX ORDER — CLOPIDOGREL BISULFATE 75 MG/1
75 TABLET ORAL DAILY
Qty: 30 TABLET | Refills: 1 | Status: SHIPPED | OUTPATIENT
Start: 2025-04-22

## 2025-04-29 ENCOUNTER — TELEPHONE (OUTPATIENT)
Dept: VASCULAR SURGERY | Facility: CLINIC | Age: 75
End: 2025-04-29
Payer: MEDICARE

## 2025-05-04 DIAGNOSIS — G62.9 PERIPHERAL POLYNEUROPATHY: Primary | ICD-10-CM

## 2025-05-05 RX ORDER — GABAPENTIN 300 MG/1
300 CAPSULE ORAL 2 TIMES DAILY
Qty: 120 CAPSULE | Refills: 0 | Status: SHIPPED | OUTPATIENT
Start: 2025-05-05

## 2025-05-13 ENCOUNTER — HOSPITAL ENCOUNTER (OUTPATIENT)
Dept: RADIOLOGY | Facility: HOSPITAL | Age: 75
Discharge: HOME | End: 2025-05-13
Attending: SURGERY
Payer: MEDICARE

## 2025-05-13 DIAGNOSIS — M79.606 PAIN AND SWELLING OF LOWER EXTREMITY, UNSPECIFIED LATERALITY: ICD-10-CM

## 2025-05-13 DIAGNOSIS — M79.89 PAIN AND SWELLING OF LOWER EXTREMITY, UNSPECIFIED LATERALITY: ICD-10-CM

## 2025-05-13 DIAGNOSIS — I87.2 VENOUS INSUFFICIENCY (CHRONIC) (PERIPHERAL): ICD-10-CM

## 2025-05-13 PROCEDURE — 93970 EXTREMITY STUDY: CPT | Mod: 26,,, | Performed by: STUDENT IN AN ORGANIZED HEALTH CARE EDUCATION/TRAINING PROGRAM

## 2025-05-13 PROCEDURE — 93970 EXTREMITY STUDY: CPT | Mod: TC

## 2025-05-30 ENCOUNTER — TELEPHONE (OUTPATIENT)
Dept: INTERNAL MEDICINE | Facility: CLINIC | Age: 75
End: 2025-05-30
Payer: MEDICARE

## 2025-05-30 NOTE — TELEPHONE ENCOUNTER
----- Message from Natasha sent at 5/29/2025  4:35 PM CDT -----  Type: General Call Back Name of Caller: [facility] Reason [facility] is trying to see if Dr Aponte is still in next work pls give them a call Would the patient rather a call back or a response via [facility]? Call Best Call Back Number:[phone] Information:

## 2025-06-05 ENCOUNTER — OFFICE VISIT (OUTPATIENT)
Dept: SLEEP MEDICINE | Facility: CLINIC | Age: 75
End: 2025-06-05
Payer: MEDICARE

## 2025-06-05 VITALS
WEIGHT: 253.5 LBS | BODY MASS INDEX: 39.79 KG/M2 | HEIGHT: 67 IN | DIASTOLIC BLOOD PRESSURE: 76 MMHG | HEART RATE: 73 BPM | SYSTOLIC BLOOD PRESSURE: 124 MMHG

## 2025-06-05 DIAGNOSIS — G47.19 OTHER HYPERSOMNIA: Primary | ICD-10-CM

## 2025-06-05 DIAGNOSIS — Z96.651 S/P TOTAL KNEE REPLACEMENT, RIGHT: Primary | ICD-10-CM

## 2025-06-05 DIAGNOSIS — R06.83 SNORING: ICD-10-CM

## 2025-06-05 DIAGNOSIS — G47.33 OSA (OBSTRUCTIVE SLEEP APNEA): ICD-10-CM

## 2025-06-05 DIAGNOSIS — G47.00 INSOMNIA, UNSPECIFIED TYPE: ICD-10-CM

## 2025-06-05 PROCEDURE — 99999 PR PBB SHADOW E&M-EST. PATIENT-LVL IV: CPT | Mod: PBBFAC,,, | Performed by: NURSE PRACTITIONER

## 2025-06-05 PROCEDURE — 3074F SYST BP LT 130 MM HG: CPT | Mod: CPTII,S$GLB,, | Performed by: NURSE PRACTITIONER

## 2025-06-05 PROCEDURE — 3078F DIAST BP <80 MM HG: CPT | Mod: CPTII,S$GLB,, | Performed by: NURSE PRACTITIONER

## 2025-06-05 PROCEDURE — 99204 OFFICE O/P NEW MOD 45 MIN: CPT | Mod: S$GLB,,, | Performed by: NURSE PRACTITIONER

## 2025-06-05 PROCEDURE — 1159F MED LIST DOCD IN RCRD: CPT | Mod: CPTII,S$GLB,, | Performed by: NURSE PRACTITIONER

## 2025-06-09 ENCOUNTER — OFFICE VISIT (OUTPATIENT)
Dept: ORTHOPEDICS | Facility: CLINIC | Age: 75
End: 2025-06-09
Payer: MEDICARE

## 2025-06-09 ENCOUNTER — TELEPHONE (OUTPATIENT)
Dept: SLEEP MEDICINE | Facility: OTHER | Age: 75
End: 2025-06-09
Payer: MEDICARE

## 2025-06-09 ENCOUNTER — HOSPITAL ENCOUNTER (OUTPATIENT)
Dept: RADIOLOGY | Facility: HOSPITAL | Age: 75
Discharge: HOME | End: 2025-06-09
Attending: ORTHOPAEDIC SURGERY
Payer: MEDICARE

## 2025-06-09 VITALS — WEIGHT: 256.19 LBS | BODY MASS INDEX: 40.21 KG/M2 | HEIGHT: 67 IN

## 2025-06-09 DIAGNOSIS — M25.561 CHRONIC PAIN OF RIGHT KNEE: ICD-10-CM

## 2025-06-09 DIAGNOSIS — Z96.651 S/P TOTAL KNEE REPLACEMENT, RIGHT: ICD-10-CM

## 2025-06-09 DIAGNOSIS — E66.01 MORBID OBESITY WITH BMI OF 40.0-44.9, ADULT: ICD-10-CM

## 2025-06-09 DIAGNOSIS — G89.29 CHRONIC PAIN OF RIGHT KNEE: ICD-10-CM

## 2025-06-09 DIAGNOSIS — Z96.651 S/P TOTAL KNEE REPLACEMENT, RIGHT: Primary | ICD-10-CM

## 2025-06-09 PROCEDURE — 73562 X-RAY EXAM OF KNEE 3: CPT | Mod: TC,RT

## 2025-06-09 PROCEDURE — 3288F FALL RISK ASSESSMENT DOCD: CPT | Mod: CPTII,S$GLB,, | Performed by: ORTHOPAEDIC SURGERY

## 2025-06-09 PROCEDURE — 73560 X-RAY EXAM OF KNEE 1 OR 2: CPT | Mod: 26,59,LT, | Performed by: RADIOLOGY

## 2025-06-09 PROCEDURE — 99213 OFFICE O/P EST LOW 20 MIN: CPT | Mod: S$GLB,,, | Performed by: ORTHOPAEDIC SURGERY

## 2025-06-09 PROCEDURE — 1125F AMNT PAIN NOTED PAIN PRSNT: CPT | Mod: CPTII,S$GLB,, | Performed by: ORTHOPAEDIC SURGERY

## 2025-06-09 PROCEDURE — 99999 PR PBB SHADOW E&M-EST. PATIENT-LVL IV: CPT | Mod: PBBFAC,,, | Performed by: ORTHOPAEDIC SURGERY

## 2025-06-09 PROCEDURE — 1101F PT FALLS ASSESS-DOCD LE1/YR: CPT | Mod: CPTII,S$GLB,, | Performed by: ORTHOPAEDIC SURGERY

## 2025-06-09 PROCEDURE — 73562 X-RAY EXAM OF KNEE 3: CPT | Mod: 26,RT,, | Performed by: RADIOLOGY

## 2025-06-09 PROCEDURE — 1159F MED LIST DOCD IN RCRD: CPT | Mod: CPTII,S$GLB,, | Performed by: ORTHOPAEDIC SURGERY

## 2025-06-09 NOTE — PROGRESS NOTES
"Subjective:      Patient ID: Sandra Lui is a 75 y.o. female.    Chief Complaint: Pain of the Right Knee      History of Present Illness    CHIEF COMPLAINT:  - Right knee pain and functional limitations    HPI:  - Presents for evaluation of right knee pain s/p right total knee arthroplasty  - Pain levels vary between 5 and 9 out of 10  - Pain worsens with walking, with significant limitations in mobility  - Reports right knee instability and uses a cane for support  - Experiences swelling, a feeling of heaviness in the knee, and numbness along the front of the shin bone and surrounding areas  - Has a history of kidney function issues, limiting the use of anti-inflammatory medications  - Denies any locking or catching in the knee    PREVIOUS TREATMENTS:  - PT: Completed immediately after surgery         Review of Systems   Constitutional: Negative for chills, fever and night sweats.   HENT:  Negative for hearing loss.    Eyes:  Negative for blurred vision and double vision.   Cardiovascular:  Negative for chest pain, claudication and leg swelling.   Respiratory:  Negative for shortness of breath.    Endocrine: Negative for polydipsia, polyphagia and polyuria.   Hematologic/Lymphatic: Negative for adenopathy and bleeding problem. Does not bruise/bleed easily.   Skin:  Negative for poor wound healing.   Gastrointestinal:  Negative for diarrhea and heartburn.   Genitourinary:  Negative for bladder incontinence.   Neurological:  Negative for focal weakness, headaches, numbness, paresthesias and sensory change.   Psychiatric/Behavioral:  The patient is not nervous/anxious.    Allergic/Immunologic: Negative for persistent infections.         Objective:      Body mass index is 40.12 kg/m².  Vitals:    06/09/25 0915   Weight: 116.2 kg (256 lb 2.8 oz)   Height: 5' 7" (1.702 m)           General    Constitutional: She is oriented to person, place, and time.   obese   HENT:   Head: Normocephalic and atraumatic.   Eyes: EOM " "are normal.   Cardiovascular:  Normal rate and regular rhythm.            Pulmonary/Chest: Effort normal.   Neurological: She is alert and oriented to person, place, and time.   Psychiatric: She has a normal mood and affect.           Right Knee Exam     Inspection   Erythema: absent  Scars: absent  Swelling: present  Effusion: absent  Deformity: absent  Bruising: absent    Tenderness   The patient is tender to palpation of the medial retinaculum and lateral retinaculum.    Range of Motion   Extension:  0   Flexion:  110     Tests   Ligament Examination   Lachman: normal (-1 to 2mm)   MCL - Valgus: normal (0 to 2mm)  LCL - Varus: normal    Other   Popliteal (Baker's) Cyst: absent  Sensation: decreased    Muscle Strength   Right Lower Extremity   Hip Abduction: 5/5   Quadriceps:  5/5   Hamstrin/5     Vascular Exam     Right Pulses  Dorsalis Pedis:      1+          Edema  Right Lower Leg: absent      Physical Exam    IMAGING:  - XR Right Knee: Well-fixed and positioned right total knee arthroplasty. No fracture or loosening noted. No change from prior imaging.               Assessment:       Encounter Diagnoses   Name Primary?    S/P total knee replacement, right Yes    Chronic pain of right knee     Morbid obesity with BMI of 40.0-44.9, adult           Plan:       Sandra SWENSON "Amanda" was seen today for pain.    Diagnoses and all orders for this visit:    S/P total knee replacement, right  -     C-Reactive Protein; Future  -     Sedimentation rate; Future    Chronic pain of right knee  -     C-Reactive Protein; Future  -     Sedimentation rate; Future    Morbid obesity with BMI of 40.0-44.9, adult  -     C-Reactive Protein; Future  -     Sedimentation rate; Future      Assessment & Plan            Us.  She would like to have the pain address and I believe we can send her to interventional pain management.  We would also like to send her to physical therapy.  I will see her back in 8 weeks.          This note was " generated with the assistance of ambient listening technology. Verbal consent was obtained by the patient and accompanying visitor(s) for the recording of patient appointment to facilitate this note. I attest to having reviewed and edited the generated note for accuracy, though some syntax or spelling errors may persist. Please contact the author of this note for any clarification.

## 2025-06-11 ENCOUNTER — TELEPHONE (OUTPATIENT)
Dept: ORTHOPEDICS | Facility: CLINIC | Age: 75
End: 2025-06-11
Payer: MEDICARE

## 2025-06-11 ENCOUNTER — LAB VISIT (OUTPATIENT)
Dept: LAB | Facility: HOSPITAL | Age: 75
End: 2025-06-11
Payer: MEDICARE

## 2025-06-11 ENCOUNTER — RESULTS FOLLOW-UP (OUTPATIENT)
Dept: ORTHOPEDICS | Facility: CLINIC | Age: 75
End: 2025-06-11

## 2025-06-11 DIAGNOSIS — M25.561 CHRONIC PAIN OF RIGHT KNEE: ICD-10-CM

## 2025-06-11 DIAGNOSIS — G89.29 CHRONIC PAIN OF RIGHT KNEE: ICD-10-CM

## 2025-06-11 DIAGNOSIS — Z96.651 S/P TOTAL KNEE REPLACEMENT, RIGHT: ICD-10-CM

## 2025-06-11 DIAGNOSIS — E66.01 MORBID OBESITY WITH BMI OF 40.0-44.9, ADULT: ICD-10-CM

## 2025-06-11 LAB
CRP SERPL-MCNC: 3 MG/L
ERYTHROCYTE [SEDIMENTATION RATE] IN BLOOD BY PHOTOMETRIC METHOD: 31 MM/HR

## 2025-06-11 PROCEDURE — 86140 C-REACTIVE PROTEIN: CPT

## 2025-06-11 PROCEDURE — 36415 COLL VENOUS BLD VENIPUNCTURE: CPT

## 2025-06-11 PROCEDURE — 85652 RBC SED RATE AUTOMATED: CPT

## 2025-06-11 NOTE — TELEPHONE ENCOUNTER
Called pt and informed her that Dr. Duran reviewed her labs and that they showed no signs of infection. Pt verbalized understanding.     ----- Message from Sha Duran MD sent at 6/11/2025  2:20 PM CDT -----  Please call pt: No infection  ----- Message -----  From: Lab, Background User  Sent: 6/11/2025  12:39 PM CDT  To: Sha Duran MD

## 2025-06-12 ENCOUNTER — OFFICE VISIT (OUTPATIENT)
Dept: PAIN MEDICINE | Facility: CLINIC | Age: 75
End: 2025-06-12
Payer: MEDICARE

## 2025-06-12 VITALS
DIASTOLIC BLOOD PRESSURE: 80 MMHG | RESPIRATION RATE: 12 BRPM | HEART RATE: 69 BPM | WEIGHT: 257.69 LBS | HEIGHT: 67 IN | SYSTOLIC BLOOD PRESSURE: 132 MMHG | OXYGEN SATURATION: 98 % | BODY MASS INDEX: 40.45 KG/M2

## 2025-06-12 DIAGNOSIS — Z96.651 PAIN DUE TO TOTAL RIGHT KNEE REPLACEMENT, INITIAL ENCOUNTER: Primary | ICD-10-CM

## 2025-06-12 DIAGNOSIS — T84.84XA PAIN DUE TO TOTAL RIGHT KNEE REPLACEMENT, INITIAL ENCOUNTER: Primary | ICD-10-CM

## 2025-06-12 DIAGNOSIS — M25.561 CHRONIC PAIN OF RIGHT KNEE: ICD-10-CM

## 2025-06-12 DIAGNOSIS — G62.9 PERIPHERAL POLYNEUROPATHY: ICD-10-CM

## 2025-06-12 DIAGNOSIS — E66.01 MORBID OBESITY WITH BMI OF 40.0-44.9, ADULT: ICD-10-CM

## 2025-06-12 DIAGNOSIS — Z96.651 S/P TOTAL KNEE REPLACEMENT, RIGHT: Primary | ICD-10-CM

## 2025-06-12 DIAGNOSIS — G89.29 CHRONIC PAIN OF RIGHT KNEE: ICD-10-CM

## 2025-06-12 DIAGNOSIS — G89.29 OTHER CHRONIC PAIN: ICD-10-CM

## 2025-06-12 DIAGNOSIS — Z96.651 S/P TOTAL KNEE REPLACEMENT, RIGHT: ICD-10-CM

## 2025-06-12 DIAGNOSIS — M17.12 ARTHRITIS OF LEFT KNEE: ICD-10-CM

## 2025-06-12 PROCEDURE — 1160F RVW MEDS BY RX/DR IN RCRD: CPT | Mod: CPTII,S$GLB,,

## 2025-06-12 PROCEDURE — 3288F FALL RISK ASSESSMENT DOCD: CPT | Mod: CPTII,S$GLB,,

## 2025-06-12 PROCEDURE — 1159F MED LIST DOCD IN RCRD: CPT | Mod: CPTII,S$GLB,,

## 2025-06-12 PROCEDURE — 99999 PR PBB SHADOW E&M-EST. PATIENT-LVL V: CPT | Mod: PBBFAC,,,

## 2025-06-12 PROCEDURE — 3079F DIAST BP 80-89 MM HG: CPT | Mod: CPTII,S$GLB,,

## 2025-06-12 PROCEDURE — 1101F PT FALLS ASSESS-DOCD LE1/YR: CPT | Mod: CPTII,S$GLB,,

## 2025-06-12 PROCEDURE — 1125F AMNT PAIN NOTED PAIN PRSNT: CPT | Mod: CPTII,S$GLB,,

## 2025-06-12 PROCEDURE — 3075F SYST BP GE 130 - 139MM HG: CPT | Mod: CPTII,S$GLB,,

## 2025-06-12 PROCEDURE — 99214 OFFICE O/P EST MOD 30 MIN: CPT | Mod: S$GLB,,,

## 2025-06-12 RX ORDER — LIDOCAINE 50 MG/G
2 PATCH TOPICAL DAILY PRN
Qty: 60 PATCH | Refills: 2 | Status: SHIPPED | OUTPATIENT
Start: 2025-06-12 | End: 2025-06-12

## 2025-06-12 RX ORDER — LIDOCAINE 50 MG/G
2 PATCH TOPICAL DAILY PRN
Qty: 60 PATCH | Refills: 2 | Status: SHIPPED | OUTPATIENT
Start: 2025-06-12

## 2025-06-12 RX ORDER — GABAPENTIN 300 MG/1
300 CAPSULE ORAL 3 TIMES DAILY
Qty: 90 CAPSULE | Refills: 2 | Status: SHIPPED | OUTPATIENT
Start: 2025-06-12

## 2025-06-12 NOTE — H&P (VIEW-ONLY)
Interventional Pain Management - Established Visit  Follow-Up     Referring Physician: Sha Duran MD    Chief Complaint:   Chief Complaint   Patient presents with    Follow-up     R>L bilat. knee pain; S/P knee SX on 11/05/25          SUBJECTIVE:  Interval History 6/12/2025:  Sandra Lui returns for delayed follow-up. She is s/p right TKA with Dr Duran on 11/5/2024. She continues with pain after this surgery. She also has left knee pain, but is not planning surgery at this time until the right knee pain is more tolerable. She did physical therapy after her TKA and continues HEP without benefit. She is also starting another round of PT on 6/24/2025. She takes Tylenol 1000 mg TID, Gabapentin 300 mg BID. She denies any perceived side effects. She denies recent health changes. She denies recent falls or trauma. She denies new onset fever/night sweats, urinary incontinence, bowel incontinence, significant weight changes, significant motor weakness or changes, or loss of sensations. Her right knee pain today is 8/10.      Original HPI 10/7/2025:  Sandra Lui presents to the clinic for the evaluation of arm, shoulder, and neck pain. Patient was being treated for polymyalgia at a pain clinic in [place] with prednisone for flares, intramuscular steroid injections, gabapentin 300 mg BID, tramadol 50 mg prn. The pain started 6 year ago and symptoms have been worsening. Denies inciting injury and trauma. The pain today is located in the right sided neck, BL shoulders, BL bicep region, wrists, fingers. Endorses subjective hand weakness and finger swelling once a week. Reports a history of RA and was being treated by a rheumatologist in the remote past. The pain is described as constant aching and throbbing and is rated as 10/10. The pain is rated with a score of  0/10 on the BEST day and a score of 10/10 on the WORST day.  Pain sx are worse at night, and significantly impact sleep. The patient reports <1 hours  of uninterrupted sleep per night. Symptoms interfere with daily activity and sleeping. The pain is exacerbated by cooking, cervical flexion and extension and rotation, overhead activity, and by most activity involving the upper body.  The pain is mitigated by heat and medications.  Reports history of Carpal tunnel release surgery BL 4 years. Has upcoming knee arthroplasty in Nov 2024.      Patient denies urinary incontinence, bowel incontinence, significant weight loss, significant motor weakness, and loss of sensations.     Physical Therapy/Home Exercise: completed PT for low back pain in 2023, which worsened the pain.   Knees: PT 11/2024-12/2024 (8 weeks); daily HEP since completion of PT    Pain Disability Index Review:      6/12/2025     8:45 AM   Last 3 PDI Scores   Pain Disability Index (PDI) 35       Pain Medications:  gabapentin 300 mg BID - moderately helpful  tramadol 50 mg prn - moderately helpful   Tylenol 500 mg prn - not helpful        report:  Reviewed and consistent with medication use as prescribed.    Pain Procedures:   Lumbar RFA 2022 - significant relief   Lumbar DILMA x 2 2020 - significant relief     Pertinent Surgeries:  11/5/2024 - Right TKA ([facility])    Imaging:     XR KNEE ORTHO RIGHT (XPD)     CLINICAL HISTORY:  Presence of right artificial knee joint     TECHNIQUE:  AP standing of both knees, Merchant views of both knees as well as a lateral view of the right knee were performed.     COMPARISON:  January 27, 2025     FINDINGS:  Right knee:     Reconfirmed findings of right total knee procedure, stable and satisfactory alignment and position of hardware.  Some stable foci of periarticular soft tissue calcifications seen on the lateral exam superior to the patella and patellofemoral articulation.  No acute fracture, suprapatellar bursal effusion or prepatellar soft tissue swelling.  Right knee findings unchanged to earlier exam.     Left knee:     No fracture.  Stable mild narrowing of  the medial tibiofemoral articulation, moderate to severe narrowing of lateral patellofemoral articulation, and preserved lateral tibiofemoral articulation.  Stable periarticular spurring.  No prepatellar soft tissue swelling.  Left knee findings unchanged to earlier exam.     Impression:     As above        Electronically signed by:Gianluca Houston  Date:                                            06/09/2025  Time:                                           09:15    XR KNEE ORTHO BILAT WITH FLEXION     CLINICAL HISTORY:  Pain in right knee     TECHNIQUE:  AP standing of both knees, PA flexion standing views of both knees, and Merchant views of both knees were performed.  Lateral views of both knees were also performed.     COMPARISON:  None     FINDINGS:  DJD with narrowing of the patellofemoral and the medial tibiofemoral joint spaces bilaterally more marked on the right side.  No fracture or dislocation.  No bone destruction identified     Impression:     See above        Electronically signed by:Isiah Cruz MD  Date:                                            07/26/2024  Time:                                           14:50    Past Medical History:   Diagnosis Date    Arthritis     Asthma     Depression     Disorder of kidney and ureter     GERD (gastroesophageal reflux disease)     Gout     Hyperlipidemia     Hypertension     Osteoporosis      Past Surgical History:   Procedure Laterality Date    ANKLE SURGERY      Cataract surgeries both sides around 2017      CHOLECYSTECTOMY      HYSTERECTOMY      TONSILLECTOMY      TOTAL KNEE REPLACEMENT USING COMPUTER NAVIGATION Right 11/5/2024    Procedure: ARTHROPLASTY, KNEE, TOTAL, OCTAVIO COMPUTER-ASSISTED NAVIGATION: RIGHT: SAME DAY;  Surgeon: Sha Duran MD;  Location: [facility];  Service: Orthopedics;  Laterality: Right;    TUBAL LIGATION       Social History     Socioeconomic History    Marital status: Single   Tobacco Use    Smoking status: Never    Smokeless  tobacco: Never   Substance and Sexual Activity    Alcohol use: No    Drug use: No     Social Drivers of Health     Financial Resource Strain: Low Risk  (3/6/2025)    Overall Financial Resource Strain (CARDIA)     Difficulty of Paying Living Expenses: Not very hard   Food Insecurity: Food Insecurity Present (3/6/2025)    Hunger Vital Sign     Worried About Running Out of Food in the Last Year: Sometimes true     Ran Out of Food in the Last Year: Sometimes true   Transportation Needs: No Transportation Needs (3/6/2025)    PRAPARE - Transportation     Lack of Transportation (Medical): No     Lack of Transportation (Non-Medical): No   Physical Activity: Insufficiently Active (3/6/2025)    Exercise Vital Sign     Days of Exercise per Week: 1 day     Minutes of Exercise per Session: 10 min   Stress: No Stress Concern Present (3/6/2025)    [employer] - Occupational Stress Questionnaire     Feeling of Stress : Only a little   Housing Stability: Low Risk  (3/6/2025)    Housing Stability Vital Sign     Unable to Pay for Housing in the Last Year: No     Number of Times Moved in the Last Year: 0     Homeless in the Last Year: No     No family history on file.    Review of patient's allergies indicates:   Allergen Reactions    Adhesive      Skin peeling    Erythromycin      Vomiting, weakness, nausea    Neosporin (neomycin-polymyx) Rash       Current Outpatient Medications   Medication Sig    acetaminophen (TYLENOL) 650 MG TbSR Take 1 tablet (650 mg total) by mouth every 8 (eight) hours.    albuterol (PROVENTIL/VENTOLIN HFA) 90 mcg/actuation inhaler Inhale 1-2 puffs into the lungs every 6 (six) hours as needed for Wheezing or Shortness of Breath. Rescue    allopurinoL (ZYLOPRIM) 100 MG tablet Take 1.5 tablets (150 mg total) by mouth once daily.    aspirin (ECOTRIN) 81 MG EC tablet Take 1 tablet (81 mg total) by mouth 2 (two) times a day. Hold plavix for 1 week post op    buPROPion (WELLBUTRIN XL)  150 MG TB24 tablet Take 150 mg by mouth once daily.    calcium carbonate (OS-NOBLE) 600 mg calcium (1,500 mg) Tab Take 1,200 mg by mouth once daily.    clobetasoL (TEMOVATE) 0.05 % external solution Apply 1 mL topically once daily.    clopidogreL (PLAVIX) 75 mg tablet Take 1 tablet (75 mg total) by mouth once daily.    colchicine (COLCRYS) 0.6 mg tablet Take 0.6 mg by mouth daily as needed.    ergocalciferol, vitamin D2, (VITAMIN D ORAL) Take by mouth once daily.    famotidine (PEPCID) 40 MG tablet Take 40 mg by mouth once daily.    fluorometholone 0.1% (FML) 0.1 % DrpS Place 1 drop into both eyes Daily.    fluticasone propionate (FLONASE) 50 mcg/actuation nasal spray spray 1 spray (50 mcg total) by Each Nostril route 2 (two) times daily as needed for Rhinitis.    furosemide (LASIX) 20 MG tablet Take 1 tablet (20 mg total) by mouth 2 (two) times daily.    latanoprost 0.005 % ophthalmic solution Place 1 drop into both eyes every evening.    losartan (COZAAR) 50 MG tablet Take 1 tablet (50 mg total) by mouth once daily.    metoprolol succinate (TOPROL-XL) 25 MG 24 hr tablet Take 25 mg by mouth once daily.    omega-3 fatty acids/fish oil (FISH OIL-OMEGA-3 FATTY ACIDS) 300-1,000 mg capsule Take by mouth once daily.    rosuvastatin (CRESTOR) 20 MG tablet Take 1 tablet (20 mg total) by mouth every evening.    vitamin D (VITAMIN D3) 1000 units Tab Take 1,000 Units by mouth once daily.    ALBUTEROL, REFILL, INHL Inhale into the lungs. 2 puffs as needed    gabapentin (NEURONTIN) 300 MG capsule Take 1 capsule (300 mg total) by mouth 3 (three) times daily.    ipratropium (ATROVENT) 42 mcg (0.06 %) nasal spray 2 sprays by Each Nostril route as needed. (Patient not taking: Reported on 6/12/2025)    LIDOcaine (LIDODERM) 5 % Place 2 patches onto the skin daily as needed (bilateral knee pain). Remove & Discard patch within 12 hours or as directed by MD     Current Facility-Administered Medications   Medication    influenza  "(adjuvanted) (Fluad) 45 mcg/0.5 mL IM vaccine (> or = 64 yo) 0.5 mL       REVIEW OF SYSTEMS:    GENERAL:  current fevers or chills, recent use of antibiotics denies .  HEENT:  History of migraines/headaches: denies , History of major throat surgery: denies   RESPIRATORY:  History of home oxygen or pulmonary hypertension/severe breathing dysfunction: denies   CARDIOVASCULAR:  Hx of palpitations/rhythm problems: denies  Hx of Heart Attacks/Surgery: denies   GI:  Recent abdominal discomfort or recent change in bowel habits denies   MUSCULOSKELETAL:  See HPI.  SKIN:  unhealed wounds or rashes: denies   PSYCH: major psychiatric history or recent psychosocial stressors denies   HEMATOLOGY/LYMPHOLOGY:  Hx of prolonged bleeding, Hx of Blood thinner usage: reports plavix and aspirin ; reason: TIA (x2)   NEURO:   history of seizures, strokes, chronic/old weakness (such as paralysis or paresis of any body part): denies   All other reviewed and negative other than HPI.    OBJECTIVE:    /80 (BP Location: Left arm, Patient Position: Sitting)   Pulse 69   Resp 12   Ht 5' 7" (1.702 m)   Wt 116.9 kg (257 lb 11.5 oz)   SpO2 98%   BMI 40.36 kg/m²     PHYSICAL EXAMINATION:  General appearance: Well appearing, in no acute distress, alert and appropriately communicative.  Psych:  Mood and affect appropriate.  Skin: Skin color, texture, turgor normal, no rashes or lesions, in both upper and lower body for exposed skin.  Head/face:  Atraumatic, normocephalic.  Cor: regular rate  Pulm: non-labored breathing  GI: Abdomen non-distended and non-tender.  Msk: Left knee: full AROM with pain on terminal extension and flexion. Pain to palpation to lateral and medial joint lines. Right knee: Full extension and minimally limited flexion with pain. Pain to palpation to lateral and medial joint lines. Bilateral upper and lower extremity strength is normal and symmetric.  No atrophy or tone abnormalities are noted.   Extremities: Peripheral " joint ROM is full and pain free without obvious instability or laxity in all four extremities. No deformities, edema, or skin discoloration.    Neuro: Bilateral upper and lower extremity coordination and muscle stretch reflexes without abnormalities noted.  Marino and/or Clonus: negative; loss of sensation to light touch: negative  Gait: Antalgic-ambulates with straight cane    CMP  Sodium   Date Value Ref Range Status   08/19/2024 144 136 - 145 mmol/L Final     Potassium   Date Value Ref Range Status   08/19/2024 4.1 3.5 - 5.1 mmol/L Final     Chloride   Date Value Ref Range Status   08/19/2024 110 95 - 110 mmol/L Final     CO2   Date Value Ref Range Status   08/19/2024 26 23 - 29 mmol/L Final     Glucose   Date Value Ref Range Status   08/19/2024 94 70 - 110 mg/dL Final     BUN   Date Value Ref Range Status   08/19/2024 10 8 - 23 mg/dL Final     Creatinine   Date Value Ref Range Status   08/19/2024 1.1 0.5 - 1.4 mg/dL Final     Calcium   Date Value Ref Range Status   08/19/2024 10.1 8.7 - 10.5 mg/dL Final     Total Protein   Date Value Ref Range Status   08/19/2024 6.8 6.0 - 8.4 g/dL Final     Albumin   Date Value Ref Range Status   08/19/2024 3.6 3.5 - 5.2 g/dL Final     Total Bilirubin   Date Value Ref Range Status   08/19/2024 0.6 0.1 - 1.0 mg/dL Final     Comment:     For infants and newborns, interpretation of results should be based  on gestational age, weight and in agreement with clinical  observations.    Premature Infant recommended reference ranges:  Up to 24 hours.............<8.0 mg/dL  Up to 48 hours............<12.0 mg/dL  3-5 days..................<15.0 mg/dL  6-29 days.................<15.0 mg/dL       Alkaline Phosphatase   Date Value Ref Range Status   08/19/2024 85 55 - 135 U/L Final     AST   Date Value Ref Range Status   08/19/2024 15 10 - 40 U/L Final     ALT   Date Value Ref Range Status   08/19/2024 8 (L) 10 - 44 U/L Final     Anion Gap   Date Value Ref Range Status   08/19/2024 8 8 - 16  mmol/L Final     eGFR   Date Value Ref Range Status   08/19/2024 52.1 (A) >60 mL/min/1.73 m^2 Final         ASSESSMENT: 75 y.o. year old female with generalized upper body pain, consistent with:    1. Pain due to total right knee replacement, initial encounter  Procedure Order to Pain Management    LIDOcaine (LIDODERM) 5 %      2. S/P total knee replacement, right  Ambulatory referral/consult to Pain Clinic    Procedure Order to Pain Management      3. Chronic pain of right knee  Ambulatory referral/consult to Pain Clinic      4. Morbid obesity with BMI of 40.0-44.9, adult  Ambulatory referral/consult to Pain Clinic      5. Peripheral polyneuropathy  gabapentin (NEURONTIN) 300 MG capsule      6. Arthritis of left knee  LIDOcaine (LIDODERM) 5 %      7. Other chronic pain            IMPRESSION: Sandra Lui  presents today for chronic pain s/p right TKA with Dr Duran 11/5/2024. She reports pain with walking and standing. She has completed physical therapy and continues daily HEP for her knee. She takes tylenol and Gabapentin 300 mg BID with some benefit.     PLAN:   - I have stressed the importance of physical activity and a home exercise plan to help with pain and improve health.  - Previous imaging was reviewed and discussed with the patient today.   - Prior Ortho notes reviewed.   - Procedure order placed for RIGHT genicular diagnostic block. Plan to proceed with cooled RFA if significant short-term relief from diagnostic block. She has completed over 6 weeks of physical therapy and continues with daily HEP. (>12 weeks within the last 6 months). She will keep a pain diary.   - Patient can continue with Medications for now since they are providing benefits, using them appropriately, and without side effects.  - Continue tylenol 1000 mg TID PRN  - Increase Gabapentin to 300 mg TID for neurpathic component.  - Add Lidoderm 5% patches for pain for bilateral knee pain  - Start 2nd round of PT for bilateral  knees.  - Continue f/u with Orthopedics.  - RTC 4-6 weeks after RFA if completed.   - Counseled patient regarding the importance of activity modification, constant sleeping habits, and physical therapy.    The above plan and management options were discussed at length with patient. Patient is in agreement with the above and verbalized understanding. It will be communicated with the referring physician via electronic record, fax, or mail.    Ayla Gomez NP  06/12/2025

## 2025-06-12 NOTE — PROGRESS NOTES
Interventional Pain Management - Established Visit  Follow-Up     Referring Physician: Sha Duran MD    Chief Complaint:   Chief Complaint   Patient presents with    Follow-up     R>L bilat. knee pain; S/P knee SX on 11/05/25          SUBJECTIVE:  Interval History 6/12/2025:  Sandra Lui returns for delayed follow-up. She is s/p right TKA with Dr Duran on 11/5/2024. She continues with pain after this surgery. She also has left knee pain, but is not planning surgery at this time until the right knee pain is more tolerable. She did physical therapy after her TKA and continues HEP without benefit. She is also starting another round of PT on 6/24/2025. She takes Tylenol 1000 mg TID, Gabapentin 300 mg BID. She denies any perceived side effects. She denies recent health changes. She denies recent falls or trauma. She denies new onset fever/night sweats, urinary incontinence, bowel incontinence, significant weight changes, significant motor weakness or changes, or loss of sensations. Her right knee pain today is 8/10.      Original HPI 10/7/2025:  Sandra Lui presents to the clinic for the evaluation of arm, shoulder, and neck pain. Patient was being treated for polymyalgia at a pain clinic in [place] with prednisone for flares, intramuscular steroid injections, gabapentin 300 mg BID, tramadol 50 mg prn. The pain started 6 year ago and symptoms have been worsening. Denies inciting injury and trauma. The pain today is located in the right sided neck, BL shoulders, BL bicep region, wrists, fingers. Endorses subjective hand weakness and finger swelling once a week. Reports a history of RA and was being treated by a rheumatologist in the remote past. The pain is described as constant aching and throbbing and is rated as 10/10. The pain is rated with a score of  0/10 on the BEST day and a score of 10/10 on the WORST day.  Pain sx are worse at night, and significantly impact sleep. The patient reports <1 hours  of uninterrupted sleep per night. Symptoms interfere with daily activity and sleeping. The pain is exacerbated by cooking, cervical flexion and extension and rotation, overhead activity, and by most activity involving the upper body.  The pain is mitigated by heat and medications.  Reports history of Carpal tunnel release surgery BL 4 years. Has upcoming knee arthroplasty in Nov 2024.      Patient denies urinary incontinence, bowel incontinence, significant weight loss, significant motor weakness, and loss of sensations.     Physical Therapy/Home Exercise: completed PT for low back pain in 2023, which worsened the pain.   Knees: PT 11/2024-12/2024 (8 weeks); daily HEP since completion of PT    Pain Disability Index Review:      6/12/2025     8:45 AM   Last 3 PDI Scores   Pain Disability Index (PDI) 35       Pain Medications:  gabapentin 300 mg BID - moderately helpful  tramadol 50 mg prn - moderately helpful   Tylenol 500 mg prn - not helpful        report:  Reviewed and consistent with medication use as prescribed.    Pain Procedures:   Lumbar RFA 2022 - significant relief   Lumbar DILMA x 2 2020 - significant relief     Pertinent Surgeries:  11/5/2024 - Right TKA ([facility])    Imaging:     XR KNEE ORTHO RIGHT (XPD)     CLINICAL HISTORY:  Presence of right artificial knee joint     TECHNIQUE:  AP standing of both knees, Merchant views of both knees as well as a lateral view of the right knee were performed.     COMPARISON:  January 27, 2025     FINDINGS:  Right knee:     Reconfirmed findings of right total knee procedure, stable and satisfactory alignment and position of hardware.  Some stable foci of periarticular soft tissue calcifications seen on the lateral exam superior to the patella and patellofemoral articulation.  No acute fracture, suprapatellar bursal effusion or prepatellar soft tissue swelling.  Right knee findings unchanged to earlier exam.     Left knee:     No fracture.  Stable mild narrowing of  the medial tibiofemoral articulation, moderate to severe narrowing of lateral patellofemoral articulation, and preserved lateral tibiofemoral articulation.  Stable periarticular spurring.  No prepatellar soft tissue swelling.  Left knee findings unchanged to earlier exam.     Impression:     As above        Electronically signed by:Gianluca Houston  Date:                                            06/09/2025  Time:                                           09:15    XR KNEE ORTHO BILAT WITH FLEXION     CLINICAL HISTORY:  Pain in right knee     TECHNIQUE:  AP standing of both knees, PA flexion standing views of both knees, and Merchant views of both knees were performed.  Lateral views of both knees were also performed.     COMPARISON:  None     FINDINGS:  DJD with narrowing of the patellofemoral and the medial tibiofemoral joint spaces bilaterally more marked on the right side.  No fracture or dislocation.  No bone destruction identified     Impression:     See above        Electronically signed by:Isiah Cruz MD  Date:                                            07/26/2024  Time:                                           14:50    Past Medical History:   Diagnosis Date    Arthritis     Asthma     Depression     Disorder of kidney and ureter     GERD (gastroesophageal reflux disease)     Gout     Hyperlipidemia     Hypertension     Osteoporosis      Past Surgical History:   Procedure Laterality Date    ANKLE SURGERY      Cataract surgeries both sides around 2017      CHOLECYSTECTOMY      HYSTERECTOMY      TONSILLECTOMY      TOTAL KNEE REPLACEMENT USING COMPUTER NAVIGATION Right 11/5/2024    Procedure: ARTHROPLASTY, KNEE, TOTAL, OCTAVIO COMPUTER-ASSISTED NAVIGATION: RIGHT: SAME DAY;  Surgeon: Sha Duran MD;  Location: [facility];  Service: Orthopedics;  Laterality: Right;    TUBAL LIGATION       Social History     Socioeconomic History    Marital status: Single   Tobacco Use    Smoking status: Never    Smokeless  tobacco: Never   Substance and Sexual Activity    Alcohol use: No    Drug use: No     Social Drivers of Health     Financial Resource Strain: Low Risk  (3/6/2025)    Overall Financial Resource Strain (CARDIA)     Difficulty of Paying Living Expenses: Not very hard   Food Insecurity: Food Insecurity Present (3/6/2025)    Hunger Vital Sign     Worried About Running Out of Food in the Last Year: Sometimes true     Ran Out of Food in the Last Year: Sometimes true   Transportation Needs: No Transportation Needs (3/6/2025)    PRAPARE - Transportation     Lack of Transportation (Medical): No     Lack of Transportation (Non-Medical): No   Physical Activity: Insufficiently Active (3/6/2025)    Exercise Vital Sign     Days of Exercise per Week: 1 day     Minutes of Exercise per Session: 10 min   Stress: No Stress Concern Present (3/6/2025)    [employer] [place] [employer] - Occupational Stress Questionnaire     Feeling of Stress : Only a little   Housing Stability: Low Risk  (3/6/2025)    Housing Stability Vital Sign     Unable to Pay for Housing in the Last Year: No     Number of Times Moved in the Last Year: 0     Homeless in the Last Year: No     No family history on file.    Review of patient's allergies indicates:   Allergen Reactions    Adhesive      Skin peeling    Erythromycin      Vomiting, weakness, nausea    Neosporin (neomycin-polymyx) Rash       Current Outpatient Medications   Medication Sig    acetaminophen (TYLENOL) 650 MG TbSR Take 1 tablet (650 mg total) by mouth every 8 (eight) hours.    albuterol (PROVENTIL/VENTOLIN HFA) 90 mcg/actuation inhaler Inhale 1-2 puffs into the lungs every 6 (six) hours as needed for Wheezing or Shortness of Breath. Rescue    allopurinoL (ZYLOPRIM) 100 MG tablet Take 1.5 tablets (150 mg total) by mouth once daily.    aspirin (ECOTRIN) 81 MG EC tablet Take 1 tablet (81 mg total) by mouth 2 (two) times a day. Hold plavix for 1 week post op    buPROPion (WELLBUTRIN XL)  150 MG TB24 tablet Take 150 mg by mouth once daily.    calcium carbonate (OS-NOBLE) 600 mg calcium (1,500 mg) Tab Take 1,200 mg by mouth once daily.    clobetasoL (TEMOVATE) 0.05 % external solution Apply 1 mL topically once daily.    clopidogreL (PLAVIX) 75 mg tablet Take 1 tablet (75 mg total) by mouth once daily.    colchicine (COLCRYS) 0.6 mg tablet Take 0.6 mg by mouth daily as needed.    ergocalciferol, vitamin D2, (VITAMIN D ORAL) Take by mouth once daily.    famotidine (PEPCID) 40 MG tablet Take 40 mg by mouth once daily.    fluorometholone 0.1% (FML) 0.1 % DrpS Place 1 drop into both eyes Daily.    fluticasone propionate (FLONASE) 50 mcg/actuation nasal spray spray 1 spray (50 mcg total) by Each Nostril route 2 (two) times daily as needed for Rhinitis.    furosemide (LASIX) 20 MG tablet Take 1 tablet (20 mg total) by mouth 2 (two) times daily.    latanoprost 0.005 % ophthalmic solution Place 1 drop into both eyes every evening.    losartan (COZAAR) 50 MG tablet Take 1 tablet (50 mg total) by mouth once daily.    metoprolol succinate (TOPROL-XL) 25 MG 24 hr tablet Take 25 mg by mouth once daily.    omega-3 fatty acids/fish oil (FISH OIL-OMEGA-3 FATTY ACIDS) 300-1,000 mg capsule Take by mouth once daily.    rosuvastatin (CRESTOR) 20 MG tablet Take 1 tablet (20 mg total) by mouth every evening.    vitamin D (VITAMIN D3) 1000 units Tab Take 1,000 Units by mouth once daily.    ALBUTEROL, REFILL, INHL Inhale into the lungs. 2 puffs as needed    gabapentin (NEURONTIN) 300 MG capsule Take 1 capsule (300 mg total) by mouth 3 (three) times daily.    ipratropium (ATROVENT) 42 mcg (0.06 %) nasal spray 2 sprays by Each Nostril route as needed. (Patient not taking: Reported on 6/12/2025)    LIDOcaine (LIDODERM) 5 % Place 2 patches onto the skin daily as needed (bilateral knee pain). Remove & Discard patch within 12 hours or as directed by MD     Current Facility-Administered Medications   Medication    influenza  "(adjuvanted) (Fluad) 45 mcg/0.5 mL IM vaccine (> or = 64 yo) 0.5 mL       REVIEW OF SYSTEMS:    GENERAL:  current fevers or chills, recent use of antibiotics denies .  HEENT:  History of migraines/headaches: denies , History of major throat surgery: denies   RESPIRATORY:  History of home oxygen or pulmonary hypertension/severe breathing dysfunction: denies   CARDIOVASCULAR:  Hx of palpitations/rhythm problems: denies  Hx of Heart Attacks/Surgery: denies   GI:  Recent abdominal discomfort or recent change in bowel habits denies   MUSCULOSKELETAL:  See HPI.  SKIN:  unhealed wounds or rashes: denies   PSYCH: major psychiatric history or recent psychosocial stressors denies   HEMATOLOGY/LYMPHOLOGY:  Hx of prolonged bleeding, Hx of Blood thinner usage: reports plavix and aspirin ; reason: TIA (x2)   NEURO:   history of seizures, strokes, chronic/old weakness (such as paralysis or paresis of any body part): denies   All other reviewed and negative other than HPI.    OBJECTIVE:    /80 (BP Location: Left arm, Patient Position: Sitting)   Pulse 69   Resp 12   Ht 5' 7" (1.702 m)   Wt 116.9 kg (257 lb 11.5 oz)   SpO2 98%   BMI 40.36 kg/m²     PHYSICAL EXAMINATION:  General appearance: Well appearing, in no acute distress, alert and appropriately communicative.  Psych:  Mood and affect appropriate.  Skin: Skin color, texture, turgor normal, no rashes or lesions, in both upper and lower body for exposed skin.  Head/face:  Atraumatic, normocephalic.  Cor: regular rate  Pulm: non-labored breathing  GI: Abdomen non-distended and non-tender.  Msk: Left knee: full AROM with pain on terminal extension and flexion. Pain to palpation to lateral and medial joint lines. Right knee: Full extension and minimally limited flexion with pain. Pain to palpation to lateral and medial joint lines. Bilateral upper and lower extremity strength is normal and symmetric.  No atrophy or tone abnormalities are noted.   Extremities: Peripheral " joint ROM is full and pain free without obvious instability or laxity in all four extremities. No deformities, edema, or skin discoloration.    Neuro: Bilateral upper and lower extremity coordination and muscle stretch reflexes without abnormalities noted.  Marino and/or Clonus: negative; loss of sensation to light touch: negative  Gait: Antalgic-ambulates with straight cane    CMP  Sodium   Date Value Ref Range Status   08/19/2024 144 136 - 145 mmol/L Final     Potassium   Date Value Ref Range Status   08/19/2024 4.1 3.5 - 5.1 mmol/L Final     Chloride   Date Value Ref Range Status   08/19/2024 110 95 - 110 mmol/L Final     CO2   Date Value Ref Range Status   08/19/2024 26 23 - 29 mmol/L Final     Glucose   Date Value Ref Range Status   08/19/2024 94 70 - 110 mg/dL Final     BUN   Date Value Ref Range Status   08/19/2024 10 8 - 23 mg/dL Final     Creatinine   Date Value Ref Range Status   08/19/2024 1.1 0.5 - 1.4 mg/dL Final     Calcium   Date Value Ref Range Status   08/19/2024 10.1 8.7 - 10.5 mg/dL Final     Total Protein   Date Value Ref Range Status   08/19/2024 6.8 6.0 - 8.4 g/dL Final     Albumin   Date Value Ref Range Status   08/19/2024 3.6 3.5 - 5.2 g/dL Final     Total Bilirubin   Date Value Ref Range Status   08/19/2024 0.6 0.1 - 1.0 mg/dL Final     Comment:     For infants and newborns, interpretation of results should be based  on gestational age, weight and in agreement with clinical  observations.    Premature Infant recommended reference ranges:  Up to 24 hours.............<8.0 mg/dL  Up to 48 hours............<12.0 mg/dL  3-5 days..................<15.0 mg/dL  6-29 days.................<15.0 mg/dL       Alkaline Phosphatase   Date Value Ref Range Status   08/19/2024 85 55 - 135 U/L Final     AST   Date Value Ref Range Status   08/19/2024 15 10 - 40 U/L Final     ALT   Date Value Ref Range Status   08/19/2024 8 (L) 10 - 44 U/L Final     Anion Gap   Date Value Ref Range Status   08/19/2024 8 8 - 16  mmol/L Final     eGFR   Date Value Ref Range Status   08/19/2024 52.1 (A) >60 mL/min/1.73 m^2 Final         ASSESSMENT: 75 y.o. year old female with generalized upper body pain, consistent with:    1. Pain due to total right knee replacement, initial encounter  Procedure Order to Pain Management    LIDOcaine (LIDODERM) 5 %      2. S/P total knee replacement, right  Ambulatory referral/consult to Pain Clinic    Procedure Order to Pain Management      3. Chronic pain of right knee  Ambulatory referral/consult to Pain Clinic      4. Morbid obesity with BMI of 40.0-44.9, adult  Ambulatory referral/consult to Pain Clinic      5. Peripheral polyneuropathy  gabapentin (NEURONTIN) 300 MG capsule      6. Arthritis of left knee  LIDOcaine (LIDODERM) 5 %      7. Other chronic pain            IMPRESSION: Sandra Lui  presents today for chronic pain s/p right TKA with Dr Duran 11/5/2024. She reports pain with walking and standing. She has completed physical therapy and continues daily HEP for her knee. She takes tylenol and Gabapentin 300 mg BID with some benefit.     PLAN:   - I have stressed the importance of physical activity and a home exercise plan to help with pain and improve health.  - Previous imaging was reviewed and discussed with the patient today.   - Prior Ortho notes reviewed.   - Procedure order placed for RIGHT genicular diagnostic block. Plan to proceed with cooled RFA if significant short-term relief from diagnostic block. She has completed over 6 weeks of physical therapy and continues with daily HEP. (>12 weeks within the last 6 months). She will keep a pain diary.   - Patient can continue with Medications for now since they are providing benefits, using them appropriately, and without side effects.  - Continue tylenol 1000 mg TID PRN  - Increase Gabapentin to 300 mg TID for neurpathic component.  - Add Lidoderm 5% patches for pain for bilateral knee pain  - Start 2nd round of PT for bilateral  knees.  - Continue f/u with Orthopedics.  - RTC 4-6 weeks after RFA if completed.   - Counseled patient regarding the importance of activity modification, constant sleeping habits, and physical therapy.    The above plan and management options were discussed at length with patient. Patient is in agreement with the above and verbalized understanding. It will be communicated with the referring physician via electronic record, fax, or mail.    Ayla Gomez NP  06/12/2025

## 2025-06-12 NOTE — H&P (VIEW-ONLY)
Interventional Pain Management - Established Visit  Follow-Up     Referring Physician: Sha Duran MD    Chief Complaint:   Chief Complaint   Patient presents with    Follow-up     R>L bilat. knee pain; S/P knee SX on 11/05/25          SUBJECTIVE:  Interval History 6/12/2025:  Sandra Lui returns for delayed follow-up. She is s/p right TKA with Dr Duran on 11/5/2024. She continues with pain after this surgery. She also has left knee pain, but is not planning surgery at this time until the right knee pain is more tolerable. She did physical therapy after her TKA and continues HEP without benefit. She is also starting another round of PT on 6/24/2025. She takes Tylenol 1000 mg TID, Gabapentin 300 mg BID. She denies any perceived side effects. She denies recent health changes. She denies recent falls or trauma. She denies new onset fever/night sweats, urinary incontinence, bowel incontinence, significant weight changes, significant motor weakness or changes, or loss of sensations. Her right knee pain today is 8/10.      Original HPI 10/7/2025:  Sandra Lui presents to the clinic for the evaluation of arm, shoulder, and neck pain. Patient was being treated for polymyalgia at a pain clinic in [place] with prednisone for flares, intramuscular steroid injections, gabapentin 300 mg BID, tramadol 50 mg prn. The pain started 6 year ago and symptoms have been worsening. Denies inciting injury and trauma. The pain today is located in the right sided neck, BL shoulders, BL bicep region, wrists, fingers. Endorses subjective hand weakness and finger swelling once a week. Reports a history of RA and was being treated by a rheumatologist in the remote past. The pain is described as constant aching and throbbing and is rated as 10/10. The pain is rated with a score of  0/10 on the BEST day and a score of 10/10 on the WORST day.  Pain sx are worse at night, and significantly impact sleep. The patient reports <1 hours  of uninterrupted sleep per night. Symptoms interfere with daily activity and sleeping. The pain is exacerbated by cooking, cervical flexion and extension and rotation, overhead activity, and by most activity involving the upper body.  The pain is mitigated by heat and medications.  Reports history of Carpal tunnel release surgery BL 4 years. Has upcoming knee arthroplasty in Nov 2024.      Patient denies urinary incontinence, bowel incontinence, significant weight loss, significant motor weakness, and loss of sensations.     Physical Therapy/Home Exercise: completed PT for low back pain in 2023, which worsened the pain.   Knees: PT 11/2024-12/2024 (8 weeks); daily HEP since completion of PT    Pain Disability Index Review:      6/12/2025     8:45 AM   Last 3 PDI Scores   Pain Disability Index (PDI) 35       Pain Medications:  gabapentin 300 mg BID - moderately helpful  tramadol 50 mg prn - moderately helpful   Tylenol 500 mg prn - not helpful        report:  Reviewed and consistent with medication use as prescribed.    Pain Procedures:   Lumbar RFA 2022 - significant relief   Lumbar DILMA x 2 2020 - significant relief     Pertinent Surgeries:  11/5/2024 - Right TKA ([facility])    Imaging:     XR KNEE ORTHO RIGHT (XPD)     CLINICAL HISTORY:  Presence of right artificial knee joint     TECHNIQUE:  AP standing of both knees, Merchant views of both knees as well as a lateral view of the right knee were performed.     COMPARISON:  January 27, 2025     FINDINGS:  Right knee:     Reconfirmed findings of right total knee procedure, stable and satisfactory alignment and position of hardware.  Some stable foci of periarticular soft tissue calcifications seen on the lateral exam superior to the patella and patellofemoral articulation.  No acute fracture, suprapatellar bursal effusion or prepatellar soft tissue swelling.  Right knee findings unchanged to earlier exam.     Left knee:     No fracture.  Stable mild narrowing of  the medial tibiofemoral articulation, moderate to severe narrowing of lateral patellofemoral articulation, and preserved lateral tibiofemoral articulation.  Stable periarticular spurring.  No prepatellar soft tissue swelling.  Left knee findings unchanged to earlier exam.     Impression:     As above        Electronically signed by:Gianluca Houston  Date:                                            06/09/2025  Time:                                           09:15    XR KNEE ORTHO BILAT WITH FLEXION     CLINICAL HISTORY:  Pain in right knee     TECHNIQUE:  AP standing of both knees, PA flexion standing views of both knees, and Merchant views of both knees were performed.  Lateral views of both knees were also performed.     COMPARISON:  None     FINDINGS:  DJD with narrowing of the patellofemoral and the medial tibiofemoral joint spaces bilaterally more marked on the right side.  No fracture or dislocation.  No bone destruction identified     Impression:     See above        Electronically signed by:Isiah Cruz MD  Date:                                            07/26/2024  Time:                                           14:50    Past Medical History:   Diagnosis Date    Arthritis     Asthma     Depression     Disorder of kidney and ureter     GERD (gastroesophageal reflux disease)     Gout     Hyperlipidemia     Hypertension     Osteoporosis      Past Surgical History:   Procedure Laterality Date    ANKLE SURGERY      Cataract surgeries both sides around 2017      CHOLECYSTECTOMY      HYSTERECTOMY      TONSILLECTOMY      TOTAL KNEE REPLACEMENT USING COMPUTER NAVIGATION Right 11/5/2024    Procedure: ARTHROPLASTY, KNEE, TOTAL, OCTAVIO COMPUTER-ASSISTED NAVIGATION: RIGHT: SAME DAY;  Surgeon: Sha Duran MD;  Location: [facility];  Service: Orthopedics;  Laterality: Right;    TUBAL LIGATION       Social History     Socioeconomic History    Marital status: Single   Tobacco Use    Smoking status: Never    Smokeless  tobacco: Never   Substance and Sexual Activity    Alcohol use: No    Drug use: No     Social Drivers of Health     Financial Resource Strain: Low Risk  (3/6/2025)    Overall Financial Resource Strain (CARDIA)     Difficulty of Paying Living Expenses: Not very hard   Food Insecurity: Food Insecurity Present (3/6/2025)    Hunger Vital Sign     Worried About Running Out of Food in the Last Year: Sometimes true     Ran Out of Food in the Last Year: Sometimes true   Transportation Needs: No Transportation Needs (3/6/2025)    PRAPARE - Transportation     Lack of Transportation (Medical): No     Lack of Transportation (Non-Medical): No   Physical Activity: Insufficiently Active (3/6/2025)    Exercise Vital Sign     Days of Exercise per Week: 1 day     Minutes of Exercise per Session: 10 min   Stress: No Stress Concern Present (3/6/2025)    [employer] - Occupational Stress Questionnaire     Feeling of Stress : Only a little   Housing Stability: Low Risk  (3/6/2025)    Housing Stability Vital Sign     Unable to Pay for Housing in the Last Year: No     Number of Times Moved in the Last Year: 0     Homeless in the Last Year: No     No family history on file.    Review of patient's allergies indicates:   Allergen Reactions    Adhesive      Skin peeling    Erythromycin      Vomiting, weakness, nausea    Neosporin (neomycin-polymyx) Rash       Current Outpatient Medications   Medication Sig    acetaminophen (TYLENOL) 650 MG TbSR Take 1 tablet (650 mg total) by mouth every 8 (eight) hours.    albuterol (PROVENTIL/VENTOLIN HFA) 90 mcg/actuation inhaler Inhale 1-2 puffs into the lungs every 6 (six) hours as needed for Wheezing or Shortness of Breath. Rescue    allopurinoL (ZYLOPRIM) 100 MG tablet Take 1.5 tablets (150 mg total) by mouth once daily.    aspirin (ECOTRIN) 81 MG EC tablet Take 1 tablet (81 mg total) by mouth 2 (two) times a day. Hold plavix for 1 week post op    buPROPion (WELLBUTRIN XL)  150 MG TB24 tablet Take 150 mg by mouth once daily.    calcium carbonate (OS-NOBLE) 600 mg calcium (1,500 mg) Tab Take 1,200 mg by mouth once daily.    clobetasoL (TEMOVATE) 0.05 % external solution Apply 1 mL topically once daily.    clopidogreL (PLAVIX) 75 mg tablet Take 1 tablet (75 mg total) by mouth once daily.    colchicine (COLCRYS) 0.6 mg tablet Take 0.6 mg by mouth daily as needed.    ergocalciferol, vitamin D2, (VITAMIN D ORAL) Take by mouth once daily.    famotidine (PEPCID) 40 MG tablet Take 40 mg by mouth once daily.    fluorometholone 0.1% (FML) 0.1 % DrpS Place 1 drop into both eyes Daily.    fluticasone propionate (FLONASE) 50 mcg/actuation nasal spray spray 1 spray (50 mcg total) by Each Nostril route 2 (two) times daily as needed for Rhinitis.    furosemide (LASIX) 20 MG tablet Take 1 tablet (20 mg total) by mouth 2 (two) times daily.    latanoprost 0.005 % ophthalmic solution Place 1 drop into both eyes every evening.    losartan (COZAAR) 50 MG tablet Take 1 tablet (50 mg total) by mouth once daily.    metoprolol succinate (TOPROL-XL) 25 MG 24 hr tablet Take 25 mg by mouth once daily.    omega-3 fatty acids/fish oil (FISH OIL-OMEGA-3 FATTY ACIDS) 300-1,000 mg capsule Take by mouth once daily.    rosuvastatin (CRESTOR) 20 MG tablet Take 1 tablet (20 mg total) by mouth every evening.    vitamin D (VITAMIN D3) 1000 units Tab Take 1,000 Units by mouth once daily.    ALBUTEROL, REFILL, INHL Inhale into the lungs. 2 puffs as needed    gabapentin (NEURONTIN) 300 MG capsule Take 1 capsule (300 mg total) by mouth 3 (three) times daily.    ipratropium (ATROVENT) 42 mcg (0.06 %) nasal spray 2 sprays by Each Nostril route as needed. (Patient not taking: Reported on 6/12/2025)    LIDOcaine (LIDODERM) 5 % Place 2 patches onto the skin daily as needed (bilateral knee pain). Remove & Discard patch within 12 hours or as directed by MD     Current Facility-Administered Medications   Medication    influenza  "(adjuvanted) (Fluad) 45 mcg/0.5 mL IM vaccine (> or = 66 yo) 0.5 mL       REVIEW OF SYSTEMS:    GENERAL:  current fevers or chills, recent use of antibiotics denies .  HEENT:  History of migraines/headaches: denies , History of major throat surgery: denies   RESPIRATORY:  History of home oxygen or pulmonary hypertension/severe breathing dysfunction: denies   CARDIOVASCULAR:  Hx of palpitations/rhythm problems: denies  Hx of Heart Attacks/Surgery: denies   GI:  Recent abdominal discomfort or recent change in bowel habits denies   MUSCULOSKELETAL:  See HPI.  SKIN:  unhealed wounds or rashes: denies   PSYCH: major psychiatric history or recent psychosocial stressors denies   HEMATOLOGY/LYMPHOLOGY:  Hx of prolonged bleeding, Hx of Blood thinner usage: reports plavix and aspirin ; reason: TIA (x2)   NEURO:   history of seizures, strokes, chronic/old weakness (such as paralysis or paresis of any body part): denies   All other reviewed and negative other than HPI.    OBJECTIVE:    /80 (BP Location: Left arm, Patient Position: Sitting)   Pulse 69   Resp 12   Ht 5' 7" (1.702 m)   Wt 116.9 kg (257 lb 11.5 oz)   SpO2 98%   BMI 40.36 kg/m²     PHYSICAL EXAMINATION:  General appearance: Well appearing, in no acute distress, alert and appropriately communicative.  Psych:  Mood and affect appropriate.  Skin: Skin color, texture, turgor normal, no rashes or lesions, in both upper and lower body for exposed skin.  Head/face:  Atraumatic, normocephalic.  Cor: regular rate  Pulm: non-labored breathing  GI: Abdomen non-distended and non-tender.  Msk: Left knee: full AROM with pain on terminal extension and flexion. Pain to palpation to lateral and medial joint lines. Right knee: Full extension and minimally limited flexion with pain. Pain to palpation to lateral and medial joint lines. Bilateral upper and lower extremity strength is normal and symmetric.  No atrophy or tone abnormalities are noted.   Extremities: Peripheral " joint ROM is full and pain free without obvious instability or laxity in all four extremities. No deformities, edema, or skin discoloration.    Neuro: Bilateral upper and lower extremity coordination and muscle stretch reflexes without abnormalities noted.  Marino and/or Clonus: negative; loss of sensation to light touch: negative  Gait: Antalgic-ambulates with straight cane    CMP  Sodium   Date Value Ref Range Status   08/19/2024 144 136 - 145 mmol/L Final     Potassium   Date Value Ref Range Status   08/19/2024 4.1 3.5 - 5.1 mmol/L Final     Chloride   Date Value Ref Range Status   08/19/2024 110 95 - 110 mmol/L Final     CO2   Date Value Ref Range Status   08/19/2024 26 23 - 29 mmol/L Final     Glucose   Date Value Ref Range Status   08/19/2024 94 70 - 110 mg/dL Final     BUN   Date Value Ref Range Status   08/19/2024 10 8 - 23 mg/dL Final     Creatinine   Date Value Ref Range Status   08/19/2024 1.1 0.5 - 1.4 mg/dL Final     Calcium   Date Value Ref Range Status   08/19/2024 10.1 8.7 - 10.5 mg/dL Final     Total Protein   Date Value Ref Range Status   08/19/2024 6.8 6.0 - 8.4 g/dL Final     Albumin   Date Value Ref Range Status   08/19/2024 3.6 3.5 - 5.2 g/dL Final     Total Bilirubin   Date Value Ref Range Status   08/19/2024 0.6 0.1 - 1.0 mg/dL Final     Comment:     For infants and newborns, interpretation of results should be based  on gestational age, weight and in agreement with clinical  observations.    Premature Infant recommended reference ranges:  Up to 24 hours.............<8.0 mg/dL  Up to 48 hours............<12.0 mg/dL  3-5 days..................<15.0 mg/dL  6-29 days.................<15.0 mg/dL       Alkaline Phosphatase   Date Value Ref Range Status   08/19/2024 85 55 - 135 U/L Final     AST   Date Value Ref Range Status   08/19/2024 15 10 - 40 U/L Final     ALT   Date Value Ref Range Status   08/19/2024 8 (L) 10 - 44 U/L Final     Anion Gap   Date Value Ref Range Status   08/19/2024 8 8 - 16  mmol/L Final     eGFR   Date Value Ref Range Status   08/19/2024 52.1 (A) >60 mL/min/1.73 m^2 Final         ASSESSMENT: 75 y.o. year old female with generalized upper body pain, consistent with:    1. Pain due to total right knee replacement, initial encounter  Procedure Order to Pain Management    LIDOcaine (LIDODERM) 5 %      2. S/P total knee replacement, right  Ambulatory referral/consult to Pain Clinic    Procedure Order to Pain Management      3. Chronic pain of right knee  Ambulatory referral/consult to Pain Clinic      4. Morbid obesity with BMI of 40.0-44.9, adult  Ambulatory referral/consult to Pain Clinic      5. Peripheral polyneuropathy  gabapentin (NEURONTIN) 300 MG capsule      6. Arthritis of left knee  LIDOcaine (LIDODERM) 5 %      7. Other chronic pain            IMPRESSION: Sandra Lui  presents today for chronic pain s/p right TKA with Dr Duran 11/5/2024. She reports pain with walking and standing. She has completed physical therapy and continues daily HEP for her knee. She takes tylenol and Gabapentin 300 mg BID with some benefit.     PLAN:   - I have stressed the importance of physical activity and a home exercise plan to help with pain and improve health.  - Previous imaging was reviewed and discussed with the patient today.   - Prior Ortho notes reviewed.   - Procedure order placed for RIGHT genicular diagnostic block. Plan to proceed with cooled RFA if significant short-term relief from diagnostic block. She has completed over 6 weeks of physical therapy and continues with daily HEP. (>12 weeks within the last 6 months). She will keep a pain diary.   - Patient can continue with Medications for now since they are providing benefits, using them appropriately, and without side effects.  - Continue tylenol 1000 mg TID PRN  - Increase Gabapentin to 300 mg TID for neurpathic component.  - Add Lidoderm 5% patches for pain for bilateral knee pain  - Start 2nd round of PT for bilateral  knees.  - Continue f/u with Orthopedics.  - RTC 4-6 weeks after RFA if completed.   - Counseled patient regarding the importance of activity modification, constant sleeping habits, and physical therapy.    The above plan and management options were discussed at length with patient. Patient is in agreement with the above and verbalized understanding. It will be communicated with the referring physician via electronic record, fax, or mail.    Ayla Gomez NP  06/12/2025

## 2025-06-16 RX ORDER — CLOPIDOGREL BISULFATE 75 MG/1
75 TABLET ORAL DAILY
Qty: 30 TABLET | Refills: 1 | Status: SHIPPED | OUTPATIENT
Start: 2025-06-16

## 2025-06-17 ENCOUNTER — OFFICE VISIT (OUTPATIENT)
Dept: INTERNAL MEDICINE | Facility: CLINIC | Age: 75
End: 2025-06-17
Payer: MEDICARE

## 2025-06-17 ENCOUNTER — LAB VISIT (OUTPATIENT)
Dept: LAB | Facility: HOSPITAL | Age: 75
End: 2025-06-17
Payer: MEDICARE

## 2025-06-17 VITALS
HEART RATE: 72 BPM | DIASTOLIC BLOOD PRESSURE: 68 MMHG | SYSTOLIC BLOOD PRESSURE: 124 MMHG | BODY MASS INDEX: 39.9 KG/M2 | HEIGHT: 67 IN | OXYGEN SATURATION: 97 % | WEIGHT: 254.19 LBS

## 2025-06-17 DIAGNOSIS — M35.3 POLYMYALGIA: ICD-10-CM

## 2025-06-17 DIAGNOSIS — I10 PRIMARY HYPERTENSION: ICD-10-CM

## 2025-06-17 DIAGNOSIS — G45.9 TIA (TRANSIENT ISCHEMIC ATTACK): Primary | ICD-10-CM

## 2025-06-17 DIAGNOSIS — G47.30 SLEEP APNEA, UNSPECIFIED TYPE: ICD-10-CM

## 2025-06-17 DIAGNOSIS — R73.03 PRE-DIABETES: ICD-10-CM

## 2025-06-17 DIAGNOSIS — G45.9 TIA (TRANSIENT ISCHEMIC ATTACK): ICD-10-CM

## 2025-06-17 DIAGNOSIS — N18.30 STAGE 3 CHRONIC KIDNEY DISEASE, UNSPECIFIED WHETHER STAGE 3A OR 3B CKD: ICD-10-CM

## 2025-06-17 DIAGNOSIS — I77.9 CAROTID ARTERY DISEASE, UNSPECIFIED LATERALITY, UNSPECIFIED TYPE: ICD-10-CM

## 2025-06-17 DIAGNOSIS — R73.03 PREDIABETES: ICD-10-CM

## 2025-06-17 DIAGNOSIS — F32.A DEPRESSION, UNSPECIFIED DEPRESSION TYPE: ICD-10-CM

## 2025-06-17 LAB
ABSOLUTE EOSINOPHIL (OHS): 0.08 K/UL
ABSOLUTE MONOCYTE (OHS): 0.92 K/UL (ref 0.3–1)
ABSOLUTE NEUTROPHIL COUNT (OHS): 5.74 K/UL (ref 1.8–7.7)
BASOPHILS # BLD AUTO: 0.05 K/UL
BASOPHILS NFR BLD AUTO: 0.5 %
ERYTHROCYTE [DISTWIDTH] IN BLOOD BY AUTOMATED COUNT: 13.5 % (ref 11.5–14.5)
HCT VFR BLD AUTO: 43.5 % (ref 37–48.5)
HGB BLD-MCNC: 13.3 GM/DL (ref 12–16)
IMM GRANULOCYTES # BLD AUTO: 0.02 K/UL (ref 0–0.04)
IMM GRANULOCYTES NFR BLD AUTO: 0.2 % (ref 0–0.5)
LYMPHOCYTES # BLD AUTO: 2.71 K/UL (ref 1–4.8)
MCH RBC QN AUTO: 30.4 PG (ref 27–31)
MCHC RBC AUTO-ENTMCNC: 30.6 G/DL (ref 32–36)
MCV RBC AUTO: 99 FL (ref 82–98)
NUCLEATED RBC (/100WBC) (OHS): 0 /100 WBC
PLATELET # BLD AUTO: 224 K/UL (ref 150–450)
PMV BLD AUTO: 10.7 FL (ref 9.2–12.9)
RBC # BLD AUTO: 4.38 M/UL (ref 4–5.4)
RELATIVE EOSINOPHIL (OHS): 0.8 %
RELATIVE LYMPHOCYTE (OHS): 28.5 % (ref 18–48)
RELATIVE MONOCYTE (OHS): 9.7 % (ref 4–15)
RELATIVE NEUTROPHIL (OHS): 60.3 % (ref 38–73)
WBC # BLD AUTO: 9.52 K/UL (ref 3.9–12.7)

## 2025-06-17 PROCEDURE — 80053 COMPREHEN METABOLIC PANEL: CPT

## 2025-06-17 PROCEDURE — 99999 PR PBB SHADOW E&M-EST. PATIENT-LVL III: CPT | Mod: PBBFAC,GC,,

## 2025-06-17 PROCEDURE — 36415 COLL VENOUS BLD VENIPUNCTURE: CPT

## 2025-06-17 PROCEDURE — 85025 COMPLETE CBC W/AUTO DIFF WBC: CPT

## 2025-06-17 PROCEDURE — 80061 LIPID PANEL: CPT

## 2025-06-17 PROCEDURE — 83036 HEMOGLOBIN GLYCOSYLATED A1C: CPT

## 2025-06-17 PROCEDURE — 84443 ASSAY THYROID STIM HORMONE: CPT

## 2025-06-17 RX ORDER — FAMOTIDINE 40 MG/1
40 TABLET, FILM COATED ORAL DAILY
Qty: 30 TABLET | Refills: 2 | Status: SHIPPED | OUTPATIENT
Start: 2025-06-17

## 2025-06-17 RX ORDER — TIRZEPATIDE 2.5 MG/.5ML
2.5 INJECTION, SOLUTION SUBCUTANEOUS
Qty: 2 ML | Refills: 0 | Status: CANCELLED | OUTPATIENT
Start: 2025-06-17 | End: 2025-07-15

## 2025-06-17 RX ORDER — SEMAGLUTIDE 0.25 MG/.5ML
0.25 INJECTION, SOLUTION SUBCUTANEOUS
Qty: 2 ML | Refills: 0 | Status: SHIPPED | OUTPATIENT
Start: 2025-06-17 | End: 2025-07-16

## 2025-06-17 NOTE — PROGRESS NOTES
Subjective     Chief Complaint:    History of Present Illness:  Ms. Sandra Lui is a 74 y.o. female  who recently moved back to [place] from [place], who is a known case of RUSH on CPAP at night, TIA's x2 (last one was 1.5 years ago) on aspirin, carotid stenosis bilaterally on plavix (used to follow with vascular in [place]- not amneable to surgery at the time being), HTN, bronchial asthma, back pain and disc prolapse, biltateral knee pain (will undergo replacement with Dr. Delgado) who presented for a followup. Ever since her last visit, she has been to her scheduled referrals        Polymyalgia rheumatica  -      longstanding, saw rheuma in January 2025, they dont believe it is RA or PMR  -     on gabapentin, rheumatology increased dose to 300mg TID     Hypertension, unspecified type  -     124/68 in the office  -      compliant to medications     TIA (transient ischemic attack)  -     DAPT and statin as per vascular surgery  -- [facility] neurology: only one agent  -     lipid profile WNL        Peripheral neuropathy       -     on gabapentin       -     stable      RUSH (obstructive sleep apnea)        -     CPAP at night, sleep study next week     Asthma, unspecified asthma severity, unspecified whether complicated, unspecified whether persistent  -     on bronchodilators     Obesity  - Would like to try GLP-1    Rheumatoid arthritis, involving unspecified site, unspecified whether rheumatoid factor present  -      longstanding, saw rheuma in January 2025, they dont believe it is RA or PMR  -     on gabapentin, rheumatology increased dose to 300mg TID  -      need RA followup  Gastroesophageal reflux disease, unspecified whether esophagitis present  -     on famotidine, needs refills     Bilateral carotid artery stenosis  -    on aspirin and plavix as well as crestor as per vascular surgery    Osteopenia:  DXA: January 2025: *Low bone mass (Osteopenia); FRAX calculations do not support treatment as osteoporosis.      RECOMMENDATIONS:  *Daily calcium intake 6346-5425 mg, dietary sources preferred; Vitamin D 4665-2482 IU daily.     Pre-diabetes Screening  -     HEMOGLOBIN A1C: 5.7% patient is at her baseline HBA1C      Knee replacement      -  s/p surgery in nov 2024      - still complains of pain bilaterally, has been prescribed lidocaine patches, tylenol, and PT by her rheumatologist      -   done with physiotherapy first round      -   saw pain medicine and ortho, second round of PT coming up,  lidocaine patches        PAST HISTORY:     Past Medical History:   Diagnosis Date    Arthritis     Asthma     Depression     Disorder of kidney and ureter     GERD (gastroesophageal reflux disease)     Gout     Hyperlipidemia     Hypertension     Osteoporosis        Past Surgical History:   Procedure Laterality Date    ANKLE SURGERY      Cataract surgeries both sides around 2017      CHOLECYSTECTOMY      HYSTERECTOMY      TONSILLECTOMY      TOTAL KNEE REPLACEMENT USING COMPUTER NAVIGATION Right 11/5/2024    Procedure: ARTHROPLASTY, KNEE, TOTAL, OCTAVIO COMPUTER-ASSISTED NAVIGATION: RIGHT: SAME DAY;  Surgeon: Sha Duran MD;  Location: [facility];  Service: Orthopedics;  Laterality: Right;    TUBAL LIGATION         No family history on file.    Social History     Socioeconomic History    Marital status: Single   Tobacco Use    Smoking status: Never    Smokeless tobacco: Never   Substance and Sexual Activity    Alcohol use: No    Drug use: No     Social Drivers of Health     Financial Resource Strain: Low Risk  (3/6/2025)    Overall Financial Resource Strain (CARDIA)     Difficulty of Paying Living Expenses: Not very hard   Food Insecurity: Food Insecurity Present (3/6/2025)    Hunger Vital Sign     Worried About Running Out of Food in the Last Year: Sometimes true     Ran Out of Food in the Last Year: Sometimes true   Transportation Needs: No Transportation Needs (3/6/2025)    PRAPARE - Transportation     Lack of Transportation  (Medical): No     Lack of Transportation (Non-Medical): No   Physical Activity: Insufficiently Active (3/6/2025)    Exercise Vital Sign     Days of Exercise per Week: 1 day     Minutes of Exercise per Session: 10 min   Stress: No Stress Concern Present (3/6/2025)    [employer] - Occupational Stress Questionnaire     Feeling of Stress : Only a little   Housing Stability: Low Risk  (3/6/2025)    Housing Stability Vital Sign     Unable to Pay for Housing in the Last Year: No     Number of Times Moved in the Last Year: 0     Homeless in the Last Year: No       MEDICATIONS & ALLERGIES:     Current Outpatient Medications on File Prior to Visit   Medication Sig    acetaminophen (TYLENOL) 650 MG TbSR Take 1 tablet (650 mg total) by mouth every 8 (eight) hours.    albuterol (PROVENTIL/VENTOLIN HFA) 90 mcg/actuation inhaler Inhale 1-2 puffs into the lungs every 6 (six) hours as needed for Wheezing or Shortness of Breath. Rescue    ALBUTEROL, REFILL, INHL Inhale into the lungs. 2 puffs as needed    allopurinoL (ZYLOPRIM) 100 MG tablet Take 1.5 tablets (150 mg total) by mouth once daily.    aspirin (ECOTRIN) 81 MG EC tablet Take 1 tablet (81 mg total) by mouth 2 (two) times a day. Hold plavix for 1 week post op    buPROPion (WELLBUTRIN XL) 150 MG TB24 tablet Take 150 mg by mouth once daily.    calcium carbonate (OS-NOBLE) 600 mg calcium (1,500 mg) Tab Take 1,200 mg by mouth once daily.    clobetasoL (TEMOVATE) 0.05 % external solution Apply 1 mL topically once daily.    clopidogreL (PLAVIX) 75 mg tablet Take 1 tablet (75 mg total) by mouth once daily.    colchicine (COLCRYS) 0.6 mg tablet Take 0.6 mg by mouth daily as needed.    ergocalciferol, vitamin D2, (VITAMIN D ORAL) Take by mouth once daily.    fluorometholone 0.1% (FML) 0.1 % DrpS Place 1 drop into both eyes Daily.    fluticasone propionate (FLONASE) 50 mcg/actuation nasal spray spray 1 spray (50 mcg total) by Each Nostril route 2 (two) times  "daily as needed for Rhinitis.    furosemide (LASIX) 20 MG tablet Take 1 tablet (20 mg total) by mouth 2 (two) times daily.    gabapentin (NEURONTIN) 300 MG capsule Take 1 capsule (300 mg total) by mouth 3 (three) times daily.    ipratropium (ATROVENT) 42 mcg (0.06 %) nasal spray 2 sprays by Each Nostril route as needed. (Patient not taking: Reported on 6/12/2025)    latanoprost 0.005 % ophthalmic solution Place 1 drop into both eyes every evening.    LIDOcaine (LIDODERM) 5 % Place 2 patches onto the skin daily as needed (bilateral knee pain). Remove & Discard patch within 12 hours or as directed by MD    losartan (COZAAR) 50 MG tablet Take 1 tablet (50 mg total) by mouth once daily.    metoprolol succinate (TOPROL-XL) 25 MG 24 hr tablet Take 25 mg by mouth once daily.    omega-3 fatty acids/fish oil (FISH OIL-OMEGA-3 FATTY ACIDS) 300-1,000 mg capsule Take by mouth once daily.    rosuvastatin (CRESTOR) 20 MG tablet Take 1 tablet (20 mg total) by mouth every evening.    vitamin D (VITAMIN D3) 1000 units Tab Take 1,000 Units by mouth once daily.    [DISCONTINUED] famotidine (PEPCID) 40 MG tablet Take 40 mg by mouth once daily.     Current Facility-Administered Medications on File Prior to Visit   Medication    influenza (adjuvanted) (Fluad) 45 mcg/0.5 mL IM vaccine (> or = 66 yo) 0.5 mL       Review of patient's allergies indicates:   Allergen Reactions    Adhesive      Skin peeling    Erythromycin      Vomiting, weakness, nausea    Neosporin (neomycin-polymyx) Rash       OBJECTIVE:     Vital Signs:  Vitals:    06/17/25 1521   BP: 124/68   Pulse: 72   SpO2: 97%   Weight: 115.3 kg (254 lb 3.1 oz)   Height: 5' 7" (1.702 m)       Body mass index is 39.81 kg/m².     Physical Exam:  General:  Well developed, well nourished, no acute distress  Head: Normocephalic, atraumatic  Eyes: PERRL, EOMI, clear sclera  Neck: supple, normal ROM, no thyromegaly   CVS:  S1 and S2 normal, no murmurs, rubs, gallops, 2+ peripheral " "pulses  Resp:  Lungs clear to auscultation, no wheezes, rales, rhonchi, cough  GI:  Abdomen soft, non-tender, non-distended, normoactive bowel sounds  MSK:  No muscle atrophy, cyanosis, peripheral edema, no signs of inflammation or erythema or effusion, scar present from knee replacement  Skin:  No rashes, ulcers, erythema  Neuro:  No focal deficits noted  Psych:  Appropriate mood and affect, normal judgement    Laboratory  Lab Results   Component Value Date    WBC 7.62 08/19/2024    HGB 14.1 08/19/2024    HCT 46.1 08/19/2024    MCV 98 08/19/2024     08/19/2024     @[id number](GLU,NA,K,Cl,CO2,BUN,Creatinine,Calcium,MG)@  Lab Results   Component Value Date    INR 1.0 10/23/2024    INR 1.0 06/19/2019     Lab Results   Component Value Date    HGBA1C 5.7 (H) 08/19/2024     No results for input(s): "POCTGLUCOSE" in the last 72 hours.    Diagnostic Results:  Labs: Reviewed  X-Ray: Reviewed  US: Reviewed    ASSESSMENT & PLAN:   Ms. Sandra Lui is a 75 y.o. female who presents today for a followup.    Polymyalgia rheumatica  -     on gabapentin, rheumatology increased dose to 300mg TID  -     follow with rheumatology     Hypertension, unspecified type  -     WNL     TIA (transient ischemic attack)  -     reached out to vascular surgery regarding antiplatelet therapy because [facility] neuro recommended single antiplatelet whereas DAPT by vas surgery. She has been on DAPT for 3 years now along with statin  -     lipid profile WNL      Peripheral neuropathy       -     on gabapentin       -     stable      RUSH (obstructive sleep apnea)        -     CPAP at night, sleep study next week     Asthma, unspecified asthma severity, unspecified whether complicated, unspecified whether persistent  -     on bronchodilators, compliant     Obesity  - Wegovy ordered    Rheumatoid arthritis, involving unspecified site, unspecified whether rheumatoid factor present  -      longstanding, saw rheuma in January 2025, they dont believe " it is RA or PMR  -     on gabapentin, rheumatology increased dose to 300mg TID  -      need RA followup    Gastroesophageal reflux disease, unspecified whether esophagitis present  -     on famotidine, refills given     Bilateral carotid artery stenosis  -    on aspirin and plavix as well as crestor as per vascular surgery    Osteopenia:  RECOMMENDATIONS:  *Daily calcium intake 5283-2264 mg, dietary sources preferred; Vitamin D 9258-4065 IU daily.     Pre-diabetes Screening  -     HEMOGLOBIN A1C: 5.7% patient is at her baseline HBA1C      Knee replacement      -  s/p surgery in nov 2024      - still complains of pain bilaterally, has been prescribed lidocaine patches, tylenol, and PT by her rheumatologist      -   done with physiotherapy first round      -   saw pain medicine and ortho, second round of PT coming up,  lidocaine patches          RTC in 4-6 months    Case discussed with Dr Marco Aponte MD  Internal Medicine PGY2  [facility]  [address], [place] [id number] [phone]

## 2025-06-18 LAB
ALBUMIN SERPL BCP-MCNC: 3.9 G/DL (ref 3.5–5.2)
ALP SERPL-CCNC: 93 UNIT/L (ref 40–150)
ALT SERPL W/O P-5'-P-CCNC: 10 UNIT/L (ref 10–44)
ANION GAP (OHS): 8 MMOL/L (ref 8–16)
AST SERPL-CCNC: 15 UNIT/L (ref 11–45)
BILIRUB SERPL-MCNC: 0.6 MG/DL (ref 0.1–1)
BUN SERPL-MCNC: 19 MG/DL (ref 8–23)
CALCIUM SERPL-MCNC: 9.7 MG/DL (ref 8.7–10.5)
CHLORIDE SERPL-SCNC: 109 MMOL/L (ref 95–110)
CHOLEST SERPL-MCNC: 141 MG/DL (ref 120–199)
CHOLEST/HDLC SERPL: 3.1 {RATIO} (ref 2–5)
CO2 SERPL-SCNC: 25 MMOL/L (ref 23–29)
CREAT SERPL-MCNC: 1.2 MG/DL (ref 0.5–1.4)
EAG (OHS): 111 MG/DL (ref 68–131)
GFR SERPLBLD CREATININE-BSD FMLA CKD-EPI: 47 ML/MIN/1.73/M2
GLUCOSE SERPL-MCNC: 85 MG/DL (ref 70–110)
HBA1C MFR BLD: 5.5 % (ref 4–5.6)
HDLC SERPL-MCNC: 46 MG/DL (ref 40–75)
HDLC SERPL: 32.6 % (ref 20–50)
LDLC SERPL CALC-MCNC: 69.6 MG/DL (ref 63–159)
NONHDLC SERPL-MCNC: 95 MG/DL
POTASSIUM SERPL-SCNC: 4.6 MMOL/L (ref 3.5–5.1)
PROT SERPL-MCNC: 7.1 GM/DL (ref 6–8.4)
SODIUM SERPL-SCNC: 142 MMOL/L (ref 136–145)
TRIGL SERPL-MCNC: 127 MG/DL (ref 30–150)
TSH SERPL-ACNC: 1.07 UIU/ML (ref 0.4–4)

## 2025-06-19 ENCOUNTER — HOSPITAL ENCOUNTER (OUTPATIENT)
Facility: OTHER | Age: 75
Discharge: HOME | End: 2025-06-19
Attending: STUDENT IN AN ORGANIZED HEALTH CARE EDUCATION/TRAINING PROGRAM | Admitting: ANESTHESIOLOGY
Payer: MEDICARE

## 2025-06-19 VITALS
SYSTOLIC BLOOD PRESSURE: 131 MMHG | HEIGHT: 67 IN | TEMPERATURE: 97 F | HEART RATE: 66 BPM | WEIGHT: 255 LBS | DIASTOLIC BLOOD PRESSURE: 83 MMHG | RESPIRATION RATE: 18 BRPM | OXYGEN SATURATION: 96 % | BODY MASS INDEX: 40.02 KG/M2

## 2025-06-19 DIAGNOSIS — M17.11 PRIMARY OSTEOARTHRITIS OF RIGHT KNEE: Primary | ICD-10-CM

## 2025-06-19 DIAGNOSIS — G89.29 CHRONIC PAIN: ICD-10-CM

## 2025-06-19 PROCEDURE — 63600175 PHARM REV CODE 636 W HCPCS: Performed by: STUDENT IN AN ORGANIZED HEALTH CARE EDUCATION/TRAINING PROGRAM

## 2025-06-19 PROCEDURE — 64454 NJX AA&/STRD GNCLR NRV BRNCH: CPT | Mod: KX,RT,, | Performed by: STUDENT IN AN ORGANIZED HEALTH CARE EDUCATION/TRAINING PROGRAM

## 2025-06-19 PROCEDURE — 64454 NJX AA&/STRD GNCLR NRV BRNCH: CPT | Mod: RT | Performed by: STUDENT IN AN ORGANIZED HEALTH CARE EDUCATION/TRAINING PROGRAM

## 2025-06-19 RX ORDER — BUPIVACAINE HYDROCHLORIDE 2.5 MG/ML
INJECTION, SOLUTION EPIDURAL; INFILTRATION; INTRACAUDAL; PERINEURAL
Status: DISCONTINUED | OUTPATIENT
Start: 2025-06-19 | End: 2025-06-19 | Stop reason: HOSPADM

## 2025-06-19 RX ORDER — SODIUM CHLORIDE 9 MG/ML
INJECTION, SOLUTION INTRAVENOUS CONTINUOUS
Status: DISCONTINUED | OUTPATIENT
Start: 2025-06-19 | End: 2025-06-19 | Stop reason: HOSPADM

## 2025-06-19 RX ORDER — MIDAZOLAM HYDROCHLORIDE 1 MG/ML
INJECTION INTRAMUSCULAR; INTRAVENOUS
Status: DISCONTINUED | OUTPATIENT
Start: 2025-06-19 | End: 2025-06-19 | Stop reason: HOSPADM

## 2025-06-19 RX ORDER — LIDOCAINE HYDROCHLORIDE 20 MG/ML
INJECTION, SOLUTION INFILTRATION; PERINEURAL
Status: DISCONTINUED | OUTPATIENT
Start: 2025-06-19 | End: 2025-06-19 | Stop reason: HOSPADM

## 2025-06-19 NOTE — OP NOTE
Diagnostic Genicular Nerve Block under Fluoroscopic Guidance    The procedure, risks, benefits, and options were discussed with the patient. There are no contraindications to the procedure. The patent expressed understanding and agreed to the procedure. Informed written consent was obtained prior to the start of the procedure and can be found in the patient's chart.    PATIENT NAME: Sandra Lui   MRN: [id number]     DATE OF PROCEDURE: 06/19/2025    PROCEDURE: Diagnostic Right Genicular Nerve Block under Fluoroscopic Guidance    PRE-OP DIAGNOSIS: S/P total knee replacement, right [Z96.651]  Chronic Knee Pain    POST-OP DIAGNOSIS: Same    PHYSICIAN: Jose Luis Joyce MD    ASSISTANTS: Arya Behkradi, MD     MEDICATIONS INJECTED: Bupivacine 0.25%     LOCAL ANESTHETIC INJECTED: Xylocaine 2%     SEDATION: None, 2mg IV Versed    ESTIMATED BLOOD LOSS: None    COMPLICATIONS: None    INTERVAL HISTORY: Patient has clinical findings of chronic knee pain that is not relieved by other therapies.     TECHNIQUE: Time-out was performed to identify the patient and procedure to be performed. With the patient laying in a supine position, the surgical area was prepped and draped in the usual sterile fashion using ChloraPrep and a fenestrated drape. Three target sites including the superior lateral genicular nerve where the lateral femoral shaft meets the epicondyle, the superior medial genicular nerve where the medial femoral shaft meets the epicondyle, and the inferior medial genicular nerve where the medial tibial shaft meets the epicondyle, were determined under fluoroscopy guidance. Skin anesthesia was achieved by injecting Lidocaine 2% over the injection sites. A 25 gauge, 3.5 inch spinal quinke needle was then advanced under fluoroscopy in the AP and lateral views into the positions of the geniculate nerves at each site. Once the needle tip position was confirmed on fluoroscopy, negative pressure was applied to confirm no  intravascular placement.  1 mL of the anesthetic listed above was then slowly injected at each site. The needles were removed and bleeding was nil. A sterile dressing was applied. No specimens collected. The patient tolerated the procedure well.     PRE-PROCEDURE PAIN SCORE: 3-9/10    POST-PROCEDURE PAIN SCORE: 0/10    The patient was monitored after the procedure in the recovery area. They were given post-procedure and discharge instructions to follow at home. The patient was discharged in a stable condition.    Jose Luis Joyce MD

## 2025-06-19 NOTE — DISCHARGE INSTRUCTIONS

## 2025-06-19 NOTE — INTERVAL H&P NOTE
The patient was examined and no significant changes were noted from the updated H&P or last clinic note.    The risks and benefits of this procedure, including alternative therapies, were discussed with the patient.  The discussion of risks included infection, bleeding, need for additional procedures or surgery, nerve damage, paralysis, adverse medication reaction(s), stroke, and if appropriate for the procedure, death.  Questions regarding the procedure, risks, expected outcome, and possible side effects were solicited and answered to Pat's satisfaction.  Pat Lui wishes to proceed with the injection or procedure as confirmed by written consent.

## 2025-06-19 NOTE — DISCHARGE SUMMARY
Discharge Note  Short Stay      SUMMARY     Admit Date: 6/19/2025    Attending Physician: Jose Luis Joyce MD PhD    Discharge Physician: Jose Luis Joyce      Discharge Date: 6/19/2025 10:41 AM    Procedure(s) (LRB):  BLOCK, NERVE RIGHT GENICULAR DIAGNOSTIC (NO STEROIDS) (Right)    Final Diagnosis: S/P total knee replacement, right [Z96.651]    Disposition: Home or self care    Patient Instructions:   Current Discharge Medication List        CONTINUE these medications which have NOT CHANGED    Details   acetaminophen (TYLENOL) 650 MG TbSR Take 1 tablet (650 mg total) by mouth every 8 (eight) hours.  Qty: 120 tablet, Refills: 0    Associated Diagnoses: S/P total knee replacement, right      albuterol (PROVENTIL/VENTOLIN HFA) 90 mcg/actuation inhaler Inhale 1-2 puffs into the lungs every 6 (six) hours as needed for Wheezing or Shortness of Breath. Rescue  Qty: 18 g, Refills: 11    Associated Diagnoses: Asthma, unspecified asthma severity, unspecified whether complicated, unspecified whether persistent      ALBUTEROL, REFILL, INHL Inhale into the lungs. 2 puffs as needed      allopurinoL (ZYLOPRIM) 100 MG tablet Take 1.5 tablets (150 mg total) by mouth once daily.  Qty: 135 tablet, Refills: 1    Associated Diagnoses: Gout, unspecified cause, unspecified chronicity, unspecified site      aspirin (ECOTRIN) 81 MG EC tablet Take 1 tablet (81 mg total) by mouth 2 (two) times a day. Hold plavix for 1 week post op  Qty: 60 tablet, Refills: 0    Comments: Bedside delivery. Surgery 11/4. Same day  Associated Diagnoses: S/P total knee replacement, right      buPROPion (WELLBUTRIN XL) 150 MG TB24 tablet Take 150 mg by mouth once daily.      calcium carbonate (OS-NOBLE) 600 mg calcium (1,500 mg) Tab Take 1,200 mg by mouth once daily.      clobetasoL (TEMOVATE) 0.05 % external solution Apply 1 mL topically once daily.      clopidogreL (PLAVIX) 75 mg tablet Take 1 tablet (75 mg total) by mouth once daily.  Qty: 30 tablet, Refills: 1       colchicine (COLCRYS) 0.6 mg tablet Take 0.6 mg by mouth daily as needed.      ergocalciferol, vitamin D2, (VITAMIN D ORAL) Take by mouth once daily.      famotidine (PEPCID) 40 MG tablet Take 1 tablet (40 mg total) by mouth once daily.  Qty: 30 tablet, Refills: 2      fluorometholone 0.1% (FML) 0.1 % DrpS Place 1 drop into both eyes Daily.      fluticasone propionate (FLONASE) 50 mcg/actuation nasal spray spray 1 spray (50 mcg total) by Each Nostril route 2 (two) times daily as needed for Rhinitis.  Qty: 16 g, Refills: 5    Associated Diagnoses: Seasonal allergic rhinitis due to other allergic trigger      furosemide (LASIX) 20 MG tablet Take 1 tablet (20 mg total) by mouth 2 (two) times daily.  Qty: 60 tablet, Refills: 2      gabapentin (NEURONTIN) 300 MG capsule Take 1 capsule (300 mg total) by mouth 3 (three) times daily.  Qty: 90 capsule, Refills: 2    Associated Diagnoses: Peripheral polyneuropathy      latanoprost 0.005 % ophthalmic solution Place 1 drop into both eyes every evening.      LIDOcaine (LIDODERM) 5 % Place 2 patches onto the skin daily as needed (bilateral knee pain). Remove & Discard patch within 12 hours or as directed by MD  Qty: 60 patch, Refills: 2    Associated Diagnoses: Pain due to total right knee replacement, initial encounter; Arthritis of left knee      losartan (COZAAR) 50 MG tablet Take 1 tablet (50 mg total) by mouth once daily.  Qty: 30 tablet, Refills: 2    Comments: .      metoprolol succinate (TOPROL-XL) 25 MG 24 hr tablet Take 25 mg by mouth once daily.      omega-3 fatty acids/fish oil (FISH OIL-OMEGA-3 FATTY ACIDS) 300-1,000 mg capsule Take by mouth once daily.      rosuvastatin (CRESTOR) 20 MG tablet Take 1 tablet (20 mg total) by mouth every evening.  Qty: 30 tablet, Refills: 2      semaglutide, weight loss, (WEGOVY) 0.25 mg/0.5 mL PnIj Inject 0.25 mg into the skin every 7 days.  Qty: 2 mL, Refills: 0    Associated Diagnoses: TIA (transient ischemic attack); Carotid  artery disease, unspecified laterality, unspecified type; Pre-diabetes; Polymyalgia; BMI 39.0-39.9,adult; Sleep apnea, unspecified type; Primary hypertension; Prediabetes; Stage 3 chronic kidney disease, unspecified whether stage 3a or 3b CKD; Depression, unspecified depression type      vitamin D (VITAMIN D3) 1000 units Tab Take 1,000 Units by mouth once daily.           STOP taking these medications       ipratropium (ATROVENT) 42 mcg (0.06 %) nasal spray Comments:   Reason for Stopping:         pneumoc 20-macie conj-dip cr,PF, (PREVNAR-20, PF,) 0.5 mL Syrg injection Comments:   Reason for Stopping:                   Discharge Diagnosis: S/P total knee replacement, right [Z96.651]  Condition on Discharge: Stable with no complications to procedure   Diet on Discharge: Same as before.  Activity: as per instruction sheet.  Discharge to: Home with a responsible adult.  Follow up: 2-4 weeks       Please call my office or pager at [phone] if experienced any weakness or loss of sensation, fever > 101.5, pain uncontrolled with oral medications, persistent nausea/vomiting/or diarrhea, redness or drainage from the incisions, or any other worrisome concerns. If physician on call was not reached or could not communicate with our office for any reason please go to the nearest emergency department      Jose Luis Joyce MD PhD

## 2025-06-20 ENCOUNTER — TELEPHONE (OUTPATIENT)
Dept: PAIN MEDICINE | Facility: CLINIC | Age: 75
End: 2025-06-20
Payer: MEDICARE

## 2025-06-20 DIAGNOSIS — M17.11 PRIMARY OSTEOARTHRITIS OF RIGHT KNEE: ICD-10-CM

## 2025-06-20 DIAGNOSIS — M25.561 CHRONIC PAIN OF RIGHT KNEE: Primary | ICD-10-CM

## 2025-06-20 DIAGNOSIS — G89.29 CHRONIC PAIN OF RIGHT KNEE: Primary | ICD-10-CM

## 2025-06-20 NOTE — TELEPHONE ENCOUNTER
Copied from CRM #[id number]. Topic: General Inquiry - Patient Advice  >> Jun 20, 2025  8:25 AM Leela wrote:  Type: Patient Call Back    Who called:Pat    What is the request in detail:Pt is requesting a call back to give pain diary.     Can the clinic reply by [facility]?Yes    Would the patient rather a call back or a response via [facility]? call    Best call back number:[phone]    Additional Information:

## 2025-06-20 NOTE — TELEPHONE ENCOUNTER
Right Genicular Nerve Block    Pre procedure pain level: 8  Percentage of relief within 24 hours of procedure: 100%  Post procedure level: 0  Current pain level: 0  Any Improvements in ADL: bathing, dressing, eating, transferring, using toilet, and walking

## 2025-06-21 ENCOUNTER — TELEPHONE (OUTPATIENT)
Dept: HEPATOLOGY | Facility: HOSPITAL | Age: 75
End: 2025-06-21
Payer: MEDICARE

## 2025-06-21 ENCOUNTER — PATIENT MESSAGE (OUTPATIENT)
Dept: PAIN MEDICINE | Facility: OTHER | Age: 75
End: 2025-06-21
Payer: MEDICARE

## 2025-06-23 ENCOUNTER — PATIENT MESSAGE (OUTPATIENT)
Dept: PAIN MEDICINE | Facility: CLINIC | Age: 75
End: 2025-06-23
Payer: MEDICARE

## 2025-06-24 ENCOUNTER — CLINICAL SUPPORT (OUTPATIENT)
Dept: REHABILITATION | Facility: HOSPITAL | Age: 75
End: 2025-06-24
Attending: ORTHOPAEDIC SURGERY
Payer: MEDICARE

## 2025-06-24 DIAGNOSIS — M25.561 CHRONIC PAIN OF RIGHT KNEE: Primary | ICD-10-CM

## 2025-06-24 DIAGNOSIS — G89.29 CHRONIC PAIN OF RIGHT KNEE: Primary | ICD-10-CM

## 2025-06-24 PROCEDURE — 97112 NEUROMUSCULAR REEDUCATION: CPT

## 2025-06-24 PROCEDURE — 97161 PT EVAL LOW COMPLEX 20 MIN: CPT

## 2025-06-25 NOTE — PROGRESS NOTES
Outpatient Rehab    Physical Therapy Evaluation    Patient Name: Amanda Lui  MRN: [id number]  YOB: 1950  Encounter Date: 6/24/2025    Therapy Diagnosis:   Encounter Diagnosis   Name Primary?    Chronic pain of right knee Yes     Physician: Sha Duran MD    Physician Orders: Eval and Treat  Medical Diagnosis: S/P total knee replacement, right  Chronic pain of right knee  Morbid obesity with BMI of 40.0-44.9, adult  Surgical Diagnosis: R TKA, future nerve ablation   Surgical Date: Not applicable for this Episode  Days Since Last Surgery: Not applicable for this Episode    Visit # / Visits Authorized:  1 / 1  Insurance Authorization Period: 6/9/2025 to 6/9/2026  Date of Evaluation: 6/24/2025  Plan of Care Certification: 6/24/2025 to 8/29/2025     Time In: 1415   Time Out: 1500  Total Time (in minutes): 45   Total Billable Time (in minutes):  45 minutes    Intake Outcome Measure for FOTO Survey    Therapist reviewed FOTO scores for Amanda Lui on 6/24/2025.   FOTO report - see Media section or FOTO account episode details.     Intake Score:  See media section.     Precautions:     Standard      Subjective   History of Present Illness  Amanda is a 75 y.o. female who reports to physical therapy with a chief concern of right knee pain and stiffnes s/p TKA.     The patient reports a medical diagnosis of S/P total knee replacement, right (Z96.651), Chronic pain of right knee (M25.561, G89.29), Morbid obesity with BMI of 40.0-44.9, adult (E66.01, Z68.41).  Patient reports a surgery of R TKA, future nerve ablation.          History of Present Condition/Illness: Patient reports that she has been doing well after right total knee arthroplasty in October. Pain increased in her right knee since May. Ortho MD referred her to pain management. She is now scheduled to receive a nerve ablation in July. She reports that if she goes to the grocery or walks 10 blocks, she cannot move her knee the next day.     Activities  of Daily Living  Social history was obtained from Patient.               Previously independent with activities of daily living? Yes     Currently independent with activities of daily living? Yes          Previously independent with instrumental activities of daily living? Yes     Currently independent with instrumental activities of daily living? Yes              Pain     Patient reports a current pain level of 3/10. Pain at best is reported as 2/10. Pain at worst is reported as 10/10.   Location: R Knee  Clinical Progression (since onset): Worsening  Pain Qualities: Aching, Burning  Pain-Relieving Factors: Rest, Ice, Elevation  Pain-Aggravating Factors: Bending, Movement, Kneeling, Stair climbing, Squatting, Straightening, Walking         Review of Systems  Patient denies: Cancer History, Cardiac History, and Diabetes  Additional Red Flag Details: History of two TIAs (about 2 years ago); borderline diabetic for 40 years; has asthma, carries inhaler in her purse, very seldom has episodes; denies seizures     Treatment History  Treatments  Previously Received Treatments: Yes  Previous Treatments: Physical therapy  Currently Receiving Treatments: No    Living Arrangements  Living Situation  Living Arrangements: Alone  Support Systems: Children, Family members        Employment  Patient does not report that: Does the patient's condition impact their ability to work?  Employment Status: Retired   Retired hospice nurse      Past Medical History/Physical Systems Review:   Sandra Lui  has a past medical history of Arthritis, Asthma, Depression, Disorder of kidney and ureter, GERD (gastroesophageal reflux disease), Gout, Hyperlipidemia, Hypertension, and Osteoporosis.    Sandra Lui  has a past surgical history that includes Tubal ligation; Hysterectomy; Tonsillectomy; Cholecystectomy; Ankle surgery; Cataract surgeries both sides around 2017; Total knee replacement using computer navigation (Right, 11/5/2024); and  Injection of anesthetic agent around nerve (Right, 6/19/2025).    Sandra SWENSON has a current medication list which includes the following prescription(s): acetaminophen, albuterol, albuterol, allopurinol, aspirin, bupropion, calcium carbonate, clobetasol, clopidogrel, colchicine, ergocalciferol (vitamin d2), famotidine, fluorometholone 0.1%, fluticasone propionate, furosemide, gabapentin, latanoprost, lidocaine, losartan, metoprolol succinate, fish oil-omega-3 fatty acids, rosuvastatin, wegovy, and vitamin d, and the following Facility-Administered Medications: influenza (adjuvanted).    Review of patient's allergies indicates:   Allergen Reactions    Adhesive      Skin peeling    Erythromycin      Vomiting, weakness, nausea    Neosporin (neomycin-polymyx) Rash        Objective   Knee Observations  Right Knee Observations  Not Present: Straight Leg Raise Extensor Lag  Left Knee Observations  Not Present: Straight Leg Raise Extensor Lag             Knee Range of Motion   Right Knee   Active (deg) Passive (deg) Pain   Flexion 105   Yes   Extension 0           Left Knee   Active (deg) Passive (deg) Pain   Flexion 120       Extension 0                          Hip Strength - Planes of Motion   Right Strength Right Pain Left Strength Left  Pain   Flexion (L2) 5   5     Extension           ABduction           ADduction           Internal Rotation           External Rotation               Knee Strength   Right Strength Right Pain Left Strength Left  Pain   Flexion (S2) 5   5     Prone Flexion           Extension (L3) 5   5       Knee Extensor Lag  No Lag: Right and Left       Ankle/Foot Strength - Planes of Motion   Right Strength Right Pain Left Strength Left  Pain   Dorsiflexion (L4) 5   5     Plantar Flexion (S1)           Inversion           Eversion           Great Toe Flexion           Great Toe Extension (L5)           Lesser Toes Flexion           Lesser Toes Extension                      Treatment:  Balance/Neuromuscular Re-Education  NMR 1: Education on prognosis, plan of care, home exercise program, exercise justification, involved anatomy, swelling/pain management  NMR 2: HEP: See patient instructions for HEP to be performed daily and progressed as tolerated    Time Entry(in minutes):  PT Evaluation (Low) Time Entry: 25  Therapeutic Activity Time Entry: 20    Assessment & Plan   Assessment  Amanda presents with a condition of Low complexity.   Presentation of Symptoms: Stable  Will Comorbidities Impact Care: No       Functional Limitations: Activity tolerance, Completing self-care activities, Proprioception, Range of motion, Participating in leisure activities, Pain with ADLs/IADLs, Gross motor coordination  Impairments: Pain with functional activity, Impaired physical strength  Personal Factors Affecting Prognosis: Pain    Prognosis: Good  Assessment Details: Amanda is a 75-year-old female with a medical diagnosis of S/P total knee replacement, right Z96.651, Chronic pain of right knee M25.561, G89.29, Morbid obesity with BMI of 40.0-44.9, adult E66.01, Z68.41. Patient demonstrates deficits with range of motion, and strength that limit ability to participate in functional and recreational activities. She would benefit from skilled PT services to normalize kinetic chain mobility, strength, and function to safely return to their prior level of activity.     Plan  From a physical therapy perspective, the patient would benefit from: Skilled Rehab Services    Planned therapy interventions include: Therapeutic exercise, Therapeutic activities, Neuromuscular re-education, Manual therapy, ADLs/IADLs, Other (Comment), and Gait training. Dry Needling (prn)  Planned modalities to include: Biofeedback, Electrical stimulation - attended, Electrical stimulation - passive/unattended, Thermotherapy (hot pack), and Cryotherapy (cold pack).        Visit Frequency: 2 times Per Week for 8 Weeks.       This plan was  discussed with Patient.   Discussion participants: Agreed Upon Plan of Care  Plan details: Frequency and duration of treatment to be adjusted as needed          The patient's spiritual, cultural, and educational needs were considered, and the patient is agreeable to the plan of care and goals.           Goals:   Active       Ambulation/movement       Patient will walk for 20 minutes with </= 3/10 reported pain to demonstrate improved functional mobility (Ongoing)       Start:  06/25/25    Expected End:  08/29/25            Patient will climb one flight of stairs with </= 3/10 reported pain to demonstrate improved functional strength (Ongoing)       Start:  06/25/25    Expected End:  08/29/25               Functional outcome       Patient will show a significant change in FOTO patient-reported outcome tool to demonstrate subjective improvement (Ongoing)       Start:  06/25/25    Expected End:  08/29/25            Patient stated goal: to decrease knee pain and return to prior level of function (Ongoing)       Start:  06/25/25    Expected End:  08/29/25            Patient will demonstrate independence in home program for support of progression (Ongoing)       Start:  06/25/25    Expected End:  07/30/25               Pain       Patient will report a 2 point reduction in pain while performing physical therapy (Ongoing)       Start:  06/25/25    Expected End:  07/30/25                Bita Caicedo, PT, DPT

## 2025-06-26 ENCOUNTER — CLINICAL SUPPORT (OUTPATIENT)
Dept: REHABILITATION | Facility: HOSPITAL | Age: 75
End: 2025-06-26
Payer: MEDICARE

## 2025-06-26 DIAGNOSIS — M25.561 CHRONIC PAIN OF RIGHT KNEE: Primary | ICD-10-CM

## 2025-06-26 DIAGNOSIS — G89.29 CHRONIC PAIN OF RIGHT KNEE: Primary | ICD-10-CM

## 2025-06-26 PROCEDURE — 97112 NEUROMUSCULAR REEDUCATION: CPT | Mod: CQ

## 2025-06-27 ENCOUNTER — TELEPHONE (OUTPATIENT)
Dept: SLEEP MEDICINE | Facility: OTHER | Age: 75
End: 2025-06-27
Payer: MEDICARE

## 2025-06-28 ENCOUNTER — HOSPITAL ENCOUNTER (OUTPATIENT)
Dept: SLEEP MEDICINE | Facility: OTHER | Age: 75
Discharge: HOME | End: 2025-06-28
Attending: NURSE PRACTITIONER
Payer: MEDICARE

## 2025-06-28 DIAGNOSIS — G47.33 OSA (OBSTRUCTIVE SLEEP APNEA): ICD-10-CM

## 2025-06-28 PROCEDURE — 95811 POLYSOM 6/>YRS CPAP 4/> PARM: CPT

## 2025-06-29 NOTE — PROGRESS NOTES
Sandra Lui to [facility] 6/27/2025 for overnight sleep observation. Pt education, setup and study explanation given prior to test. Disposable leads and sensors used where applicable. Instructions on adjustable given. CPAP titration completed using pts own Medium ResMed Nasal mask  Post study, follow up information given in am.

## 2025-06-30 ENCOUNTER — TELEPHONE (OUTPATIENT)
Dept: SLEEP MEDICINE | Facility: OTHER | Age: 75
End: 2025-06-30
Payer: MEDICARE

## 2025-07-03 PROBLEM — G47.33 OSA (OBSTRUCTIVE SLEEP APNEA): Status: ACTIVE | Noted: 2024-10-23

## 2025-07-03 NOTE — PROCEDURES
"[facility]    Split-Night Study Interpretation Report    Patient Name:  Sandra Lui  MRN#:  [id number]  :  1950  Study Date:  2025  Referring Provider:  STEPHY Mazariegos NP    Indications for Polysomnography:  The patient is a 75 year old Female who is 5' 7" and weighs 253.0 lbs.  Her BMI equals 39.7.  [place] was - and Neck Circumference was -.  A diagnostic polysomnogram was performed to evaluate for -.  After 112.5 minutes of sleep time the patient exhibited sufficient respiratory events qualifying her for a PAP trial which was then initiated.     Polysomnogram Data:  A full night polysomnogram was performed recording the standard physiologic parameters including EEG, EOG, EMG, EKG, nasal and oral airflow.  Respiratory parameters of chest and abdominal movements are recorded with (RIP) Respiratory Inductance Plethsmography.  Oxygen saturation was recorded by pulse oximetry.    Sleep Architecture:  The total recording time of the diagnostic portion of the study was 143.2 minutes.  The total sleep time was 112.5 minutes.  The patient spent 10.7% of total sleep time in Stage N1, 52.9% in Stage N2, 24.0% in Stages N3, and 12.4% in REM.  Sleep latency was 15.2 minutes.  REM latency was 101.0 minutes.  Sleep Efficiency was 78.6%.  Total wake time was 31.0 minutes for a total wake percentage of 8.1%.  Wake after Sleep Onset was 15.5 minutes.     At 12:21:10 AM the patient was placed on PAP treatment and was titrated at pressures ranging from 8* cm/H20 up to 11* cm/H20.  The total recording time of the treatment portion of the study was 290.0 minutes.  The total sleep time was 228.5 minutes.  During the treatment portion of the study, the patient spent 11.8% of total sleep time in Stage N1, 55.1% in Stage N2, 5.7% in Stages N3, and 27.4% in REM.  Sleep latency was 1.5 minutes.  REM latency was 100.0 minutes.  Sleep Efficiency was 78.8%.  Total wake time was 62.0 minutes for a total wake " percentage of 21.3%.  Wake after Sleep Onset was 60.0 minutes.     Respiratory Summary:  During the diagnostic portion of the study, the polysomnogram revealed a presence of 14 obstructive, 1 central, and - mixed apneas resulting in an Apnea index of 8.0 events per hour.  There were 19 hypopneas resulting in a Hypopnea index of 10.1 events per hour.  The combined Apnea/Hypopnea index was 18.1 events per hour.  There were a total of 10 RERA events resulting in a Respiratory Disturbance Index (RDI) of 23.5 events per hour.  Lowest oxygen saturation was 84.0% and time spent <=88% oxygen saturation was 9.7 minutes (6.8%).    During diagnostic portion [place] CO2 during sleep ranged from - to - mmHg, was greater than 50 mmHg for - minutes and greater than 55 mmHg for - minutes.  Transcutaneous CO2 during sleep ranged from - to - mmHg, was greater than 50 mmHg for - minutes and greater than 55 mmHg for - minutes.    During the treatment portion of the study, the polysomnogram revealed a presence of 5 obstructive, 4 central, and 1 mixed apneas resulting in an Apnea index of 2.6 events per hour.  There were 9 hypopneas resulting in a Hypopnea index of 2.4 events per hour.  The combined Apnea/Hypopnea index was 5.0 events per hour.  There were a total of 10 RERA events resulting in a Respiratory Disturbance Index (RDI) of 7.6 events per hour.  Lowest oxygen saturation was 89.0% and time spent <=88% oxygen saturation was - minutes (-).    During treatment portion Transcutaneous CO2 during sleep ranged from - to - mmHg, was greater than 50 mmHg for - minutes and greater than 55 mmHg for - minutes.    Limb Movement Activity:  During the diagnostic portion of the study, there were - limb movements recorded.  Of this total, - were classified as PLMs.  Of the PLMs, - were associated with arousals.  The Limb Movement index was - per hour while the PLM index was - per hour and PLM with arousals index was - per hour.    Cardiac:  "single lead EKG revealed normal sinus rhythm       PAP titration:  Mask:  own Medium ResMed Nasal mask   CPAP = 8 cwp was largely effective in lateral position in stage N2 sleep  CPAP = 9 cwp was largely effective in supine position in stage REM sleep  CPAP = 11 cwp was partially effective in supine position in stage REM sleep  The mask used in this study worked well      Oxygenation:  No significant hypoxemia was observed     Impression:  -obstructive sleep apnea    Recommendations:    -initial auto-CPAP settings CPAP min = 9 cwp  and CPAP max =  12 cwp  -if the patient complains of sleep disruption, if possible, CPAP min should be adjusted to 11cwp or higher to control events in stage REM sleep   -the patient has follow up with Sleep Medicine        Ariel Navarro MD  (This Sleep Study was interpreted by a Board Certified Sleep Specialist who conducted an epoch-by-epoch review of the entire raw data recording.)  (The indication for this sleep study was reviewed and deemed appropriate by AASM Practice Parameters or other reasons by a Board Certified Sleep Specialist.)    [facility]     Split-Night Report     Patient Name: Sandra Lui Study Date: 6/28/2025   YOB: 1950 MRN #: [id number]   Age: 75 year YOHAN #: -   Sex: Female Referring Provider: STEPHY Mazariegos NP   Height: 5' 7" Recording Tech: Sherrill Dougherty CRT SDS   Weight: 253.0 lbs Scoring Tech: Kareem Lucero RRT RPSGT   BMI: 39.7 Interpreting Physician: -   ESS: - Neck Circumference: -   Study Overview     DIAGNOSTIC TREATMENT   Lights Off: 09:57:56 PM Lights Off: 12:21:09 AM   Lights On: 12:21:09 AM Lights On: 05:11:10 AM   Time in Bed: 143.2 min. Time in Bed: 290.0 min.   Total Sleep Time: 112.5 min. Total Sleep Time: 228.5 min.   Sleep Efficiency: 78.6% Sleep Efficiency: 78.8%   Sleep Latency: 15.2 min. Sleep Latency: 1.5 min.   REM Latency from Sleep Onset: 101.0 min. REM Latency from Sleep Onset: 100.0 min.   Wake After Sleep " Onset: 15.5 min. Wake After Sleep Onset: 60.0 min.      DIAGNOSTIC TREATMENT     Count Index   Count Index   Awakenings: 11 5.9 Awakenings: 33 8.7   Arousals: 36 19.2 Arousals: 44 11.6   Apneas & Hypopneas: 34 18.1 Apneas & Hypopneas: 19 5.0    Limb Movements: - - Limb Movements: - -   Snores: - - Snores: - -   Desaturations: 22 11.7 Desaturations: 17 4.5   Minimum SpO2 TST: 84.0% Minimum SpO2 TST: 89.0%          Sleep Architecture       DIAGNOSTIC TREATMENT ENTIRE NIGHT   Stages Time (min) % TST Time (min) % TST Time (min) % TST   WAKE 31.0   62.0   93.0     Stage N1 12.0 10.7% 27.0 11.8% 39.0 11.4%   Stage N2 59.5 52.9% 126.0 55.1% 185.5 54.4%   Stage N3 27.0 24.0% 13.0 5.7% 40.0 11.7%   REM 14.0 12.4% 62.5 27.4% 76.5 22.4%         Arousal Summary       DIAGNOSTIC TREATMENT     NREM REM TST Index NREM REM TST Index   Respiratory Arousals 12 1 13 6.9 2 - 2 0.5   PLM Arousals - - - - - - - -   Isolated LM Arousals - - - - - - - -   Spontaneous Arousals 22 1 23 12.3 34 8 42 11.0   Total 34 2 36 19.2 36 8 44 11.6       Respiratory Summary     DIAGNOSTIC By Sleep Stage By Body Position Total    NREM REM Supine Non-Supine    Time (min) 98.5 14.0 72.5 40.0 112.5                 Obstructive Apnea 11 3 7 7 14   Mixed Apnea - - - - -   Central Apnea 1 - 1 - 1   Central Apnea Index 0.6 - 0.8 - 0.6   Total Apneas 12 3 8 7 15   Total Apnea Index 7.3 12.9 6.6 10.5 8.0                 Total Hypopnea 12 7 11 8 19   Total Hypopnea Index 7.3 30.0 9.1 12.0 10.1                 Apnea & Hypopnea 24 10 19 15 34   Apnea & Hypopnea Index 14.6 42.9 15.7 22.5 18.1                 RERAs 9 1 9 1 10   RERA Index 5.5 4.3 7.4 1.5 5.3                 RDI 20.1 47.1 23.2 24.0 23.5      TREATMENT By Sleep Stage By Body Position Total    NREM REM Supine Non-Supine    Time (min) 166.0 62.5 179.0 49.5 228.5                 Obstructive Apnea 4 1 4 1 5   Mixed Apnea - 1 1 - 1   Central Apnea 1 3 3 1 4   Central Apnea Index 0.4 2.9 - 1.2 0.4   Total  Apneas 5 5 8 2 10   Total Apnea Index 1.8 4.8 2.7 2.4 2.6                 Total Hypopnea 8 1 8 1 9   Total Hypopnea Index 2.9 1.0 2.7 1.2 2.4                 Apnea & Hypopnea 13 6 16 3 19   Apnea & Hypopnea Index 4.7 5.8 5.4 3.6 5.0                 RERAs 9 1 10 - 10   RERA Index 3.3 1.0 3.4 - 2.6                 RDI 8.0 6.7 8.7 3.6 7.6      Scoring Criteria: Hypopneas scored at 4% desaturation criteria.     Respiratory Event Durations       DIAGNOSTIC TREATMENT   Apnea NREM REM NREM REM   Average (seconds) 14.7 17.6 12.9 11.7   Maximum (seconds) 23.3 29.4 16.2 14.5   Hypopnea           Average (seconds) 22.5 18.4 15.3 15.5   Maximum (seconds) 31.5 25.9 30.3 15.5       Oxygen Saturation Summary       DIAGNOSTIC TREATMENT     Wake NREM REM TST Wake NREM REM TST   Average SpO2 94.9% 93.2% 88.1% 92.6% 94.6% 93.2% 92.4% 93.0%   Minimum SpO2 83.0% 89.0% 84.0% 84.0%  89.0% 89.0% 89.0% 89.0%    Maximum SpO2 99.0% 98.0% 95.0% 98.0%  99.0% 98.0% 98.0% 98.0%      DIAGNOSTIC   Oxygen Saturation Distribution     Range (%) Time in range (min) Time in range (%)   90.0 - 100.0 99.7 88.6%   80.0 - 90.0 12.8 11.4%   70.0 - 80.0 - -   60.0 - 70.0 - -   50.0 - 60.0 - -   0.0 - 50.0 - -   Time Spent <=88% SpO2     Range (%) Time in range (min) Time in range (%)   0.0 - 88.0 8.8 7.8%              Count Index   Desaturations 22 11.7    TREATMENT   Oxygen Saturation Distribution     Range (%) Time in range (min) Time in range (%)   90.0 - 100.0 263.6 92.2%   80.0 - 90.0 8.9 3.1%   70.0 - 80.0 - -   60.0 - 70.0 - -   50.0 - 60.0 - -   0.0 - 50.0 - -   Time Spent <=88% SpO2     Range (%) Time in range (min) Time in range (%)   0.0 - 88.0 - -        Count Index   Desaturations 17 4.5       Limb Movement Summary       DIAGNOSTIC TREATMENT     Count Index Count Index   Isolated Limb Movements - - - -   Periodic Limb Movements (PLMs) - - - -   Total Limb Movements - - - -      Cardiac Summary       DIAGNOSTIC TREATMENT     Wake NREM REM Total  Wake NREM REM Total   Average [place] (BPM) 64.7 63.4 72.9 64.6 61.4 60.7 60.5 60.8   Minimum NY (BPM) 60.0 60.0 66.0 60.0 56.0 56.0 56.0 56.0   Maximum [place] (BPM) 82.0 76.0 78.0 82.0 75.0 72.0 72.0 75.0             Diagnostic EtCO2     Stage Min (mmHg) Average (mmHg) Max (mmHg)   Wake - - -   NREM(1+2+3) - - -   REM - - -         Range (mmHg) Time in range (min) Time in range (%)   20.0 - 40.0 - -   40.0 - 50.0 - -   50.0 - 55.0 - -   55.0 - 100.0 - -   Excluded data <20.0 & >65.0 143.5 100.0%         TcCO2 Summary     DIAGNOSTIC     Stage Min (mmHg) Average (mmHg) Max (mmHg)   Wake - - -   NREM(1+2+3) - - -   REM - - -         Range (mmHg) Time in range (min) Time in range (%)   20.0 - 40.0 - -   40.0 - 50.0 - -   50.0 - 55.0 - -   55.0 - 100.0 - -   Excluded data <20.0 & >65.0 143.5 100.0%         TREATMENT     Stage Min (mmHg) Average (mmHg) Max (mmHg)   Wake - - -   NREM(1+2+3) - - -   REM - - -         Range (mmHg) Time in range (min) Time in range (%)   20.0 - 40.0 - -   40.0 - 50.0 - -   50.0 - 55.0 - -   55.0 - 100.0 - -   Excluded data <20.0 & >65.0 290.5 100.0%         Comments     -            Titration Summary     PAP Device PAP Level O2 Level Time (min) TST (min) NREM (min) REM (min) Wake (min) Sleep Eff% OA# CA# MA# Hyp# AHI RERA RDI Min SpO2 SpO2 <=88% (min) Ar. Index   - Off - 143.5 112.5 98.5 14.0 31.0 78.4% 14 1 - 19 18.1 10 23.5 84.0  8.8 19.2   CPAP 8 - 80.5 67.5 67.5 0.0 13.0 83.9% 1 1 - 2 3.6 1 4.4 89.0  0.0 9.8   CPAP 9 - 112.5 84.0 68.0 16.0 28.5 74.7% 2 - - 6 5.7 8 11.4 89.0  0.0 16.4   CPAP 10 - 37.5 35.0 25.5 9.5 2.5 93.3% 1 2 - - 5.1 - 5.1 91.0  0.0 3.4   CPAP 11 - 60.0 42.0 5.0 37.0 18.0 70.0% 1 1 1 1 5.7 1 7.1 89.0  0.0 11.4

## 2025-07-04 ENCOUNTER — PATIENT MESSAGE (OUTPATIENT)
Dept: PAIN MEDICINE | Facility: OTHER | Age: 75
End: 2025-07-04
Payer: MEDICARE

## 2025-07-05 ENCOUNTER — PATIENT MESSAGE (OUTPATIENT)
Dept: PAIN MEDICINE | Facility: OTHER | Age: 75
End: 2025-07-05
Payer: MEDICARE

## 2025-07-08 ENCOUNTER — CLINICAL SUPPORT (OUTPATIENT)
Dept: REHABILITATION | Facility: HOSPITAL | Age: 75
End: 2025-07-08
Payer: MEDICARE

## 2025-07-08 DIAGNOSIS — G89.29 CHRONIC PAIN OF RIGHT KNEE: Primary | ICD-10-CM

## 2025-07-08 DIAGNOSIS — M25.561 CHRONIC PAIN OF RIGHT KNEE: Primary | ICD-10-CM

## 2025-07-08 PROCEDURE — 97530 THERAPEUTIC ACTIVITIES: CPT | Mod: CQ

## 2025-07-08 PROCEDURE — 97112 NEUROMUSCULAR REEDUCATION: CPT | Mod: CQ

## 2025-07-08 NOTE — PROGRESS NOTES
Outpatient Rehab    Physical Therapy Visit    Patient Name: Amanda Lui  MRN: [id number]  YOB: 1950  Encounter Date: 7/8/2025    Therapy Diagnosis:   Encounter Diagnosis   Name Primary?    Chronic pain of right knee Yes     Physician: Sha Duran MD    Physician Orders: Eval and Treat  Medical Diagnosis: Presence of right artificial knee joint  Pain in right knee  Morbid (severe) obesity due to excess calories  Surgical Diagnosis: Right TKA   Surgical Date: 11/5/2024  Days Since Last Surgery: 245    Visit # / Visits Authorized:  2 / 10  Insurance Authorization Period: 6/19/2025 to 12/31/2025  Date of Evaluation: 6/24/2025  Plan of Care Certification: 6/25/2025 to 8/29/2025      PT/PTA: PTA   Number of PTA visits since last PT visit:2  Time In: 0750   Time Out: 0900  Total Time (in minutes): 70   Total Billable Time (in minutes): 38    FOTO:  Intake Score: 36%  Survey Score 2:  %  Survey Score 3:  %    Precautions: none    Subjective   Patient reports her Left knee is getting worse, she thinks she may be compensating for the Right knee.  Pain reported as 2/10. Bilateral knees, R > L    Objective  Objective Measures updated at progress report unless specified.     Treatment:  Balance/Neuromuscular Re-Education  NMR 1: Education on prognosis, plan of care, home exercise program, exercise justification, involved anatomy, swelling/pain management, use of starchy towel for densitization  NMR 2: quad series: quad sets, SAQs with medium foam, SAQs with 1/2 foam, LAQs at EOM + 2 lb AW x 5-10 sec hold x 5 minutes each  NMR 3: hook lying bridges, blue tb: 5 sec hold, 3 x 10 reps  NMR 4: hook lying clamshells: blue tb, 10 sec hold, 3 x 10 reps  Therapeutic Activity  TA 1: Nustep for CV and LE endurance: 11 minutes, Level 2 (Goal: 1,000K steps)  TA 2: Patient education: DOMS, load progression  TA 3: shuttle double leg press: 50 lbs, 3 x 10 reps -- single leg press: 12.5 lbs, 3 x 10 reps each    Time Entry(in  minutes):  Neuromuscular Re-Education Time Entry: 47  Therapeutic Activity Time Entry: 23    Assessment & Plan   Assessment: Overall good tolerance to therapeutic interventions noting adequate muscle response throughout. Addition of shuttle double and single leg press this session.   Evaluation/Treatment Tolerance: Patient tolerated treatment well    The patient will continue to benefit from skilled outpatient physical therapy in order to address the deficits listed in the problem list on the initial evaluation, provide patient and family education, and maximize the patients level of independence in the home and community environments.     The patient's spiritual, cultural, and educational needs were considered, and the patient is agreeable to the plan of care and goals.           Plan: Will continue per POC towards treatment goals. PT/PTA met face to face to discuss patient's treatment plan and progress towards established goals. Patient will be seen by physical therapist every sixth visit and minimally once per month.    Goals:   Active       Ambulation/movement       Patient will walk for 20 minutes with </= 3/10 reported pain to demonstrate improved functional mobility (Progressing)       Start:  06/25/25    Expected End:  08/29/25            Patient will climb one flight of stairs with </= 3/10 reported pain to demonstrate improved functional strength (Progressing)       Start:  06/25/25    Expected End:  08/29/25               Functional outcome       Patient will show a significant change in FOTO patient-reported outcome tool to demonstrate subjective improvement (Progressing)       Start:  06/25/25    Expected End:  08/29/25            Patient stated goal: to decrease knee pain and return to prior level of function (Progressing)       Start:  06/25/25    Expected End:  08/29/25            Patient will demonstrate independence in home program for support of progression (Progressing)       Start:  06/25/25     Expected End:  07/30/25               Pain       Patient will report a 2 point reduction in pain while performing physical therapy (Progressing)       Start:  06/25/25    Expected End:  07/30/25                Cristy Cordova PTA

## 2025-07-09 DIAGNOSIS — G47.33 OBSTRUCTIVE SLEEP APNEA: Primary | ICD-10-CM

## 2025-07-09 DIAGNOSIS — G47.00 INSOMNIA, UNSPECIFIED TYPE: ICD-10-CM

## 2025-07-09 DIAGNOSIS — I10 PRIMARY HYPERTENSION: ICD-10-CM

## 2025-07-09 PROCEDURE — 95811 POLYSOM 6/>YRS CPAP 4/> PARM: CPT | Mod: 26,,, | Performed by: INTERNAL MEDICINE

## 2025-07-10 ENCOUNTER — HOSPITAL ENCOUNTER (OUTPATIENT)
Facility: OTHER | Age: 75
Discharge: HOME | End: 2025-07-10
Attending: STUDENT IN AN ORGANIZED HEALTH CARE EDUCATION/TRAINING PROGRAM | Admitting: STUDENT IN AN ORGANIZED HEALTH CARE EDUCATION/TRAINING PROGRAM
Payer: MEDICARE

## 2025-07-10 VITALS
OXYGEN SATURATION: 96 % | DIASTOLIC BLOOD PRESSURE: 79 MMHG | RESPIRATION RATE: 16 BRPM | WEIGHT: 245 LBS | SYSTOLIC BLOOD PRESSURE: 145 MMHG | HEIGHT: 67 IN | HEART RATE: 65 BPM | TEMPERATURE: 98 F | BODY MASS INDEX: 38.45 KG/M2

## 2025-07-10 DIAGNOSIS — G89.29 CHRONIC PAIN: ICD-10-CM

## 2025-07-10 DIAGNOSIS — M17.11 PRIMARY OSTEOARTHRITIS OF RIGHT KNEE: Primary | ICD-10-CM

## 2025-07-10 PROCEDURE — 99153 MOD SED SAME PHYS/QHP EA: CPT | Performed by: STUDENT IN AN ORGANIZED HEALTH CARE EDUCATION/TRAINING PROGRAM

## 2025-07-10 PROCEDURE — 99152 MOD SED SAME PHYS/QHP 5/>YRS: CPT | Mod: ,,, | Performed by: STUDENT IN AN ORGANIZED HEALTH CARE EDUCATION/TRAINING PROGRAM

## 2025-07-10 PROCEDURE — 64624 DSTRJ NULYT AGT GNCLR NRV: CPT | Mod: RT | Performed by: STUDENT IN AN ORGANIZED HEALTH CARE EDUCATION/TRAINING PROGRAM

## 2025-07-10 PROCEDURE — 64624 DSTRJ NULYT AGT GNCLR NRV: CPT | Mod: RT,,, | Performed by: STUDENT IN AN ORGANIZED HEALTH CARE EDUCATION/TRAINING PROGRAM

## 2025-07-10 PROCEDURE — 99152 MOD SED SAME PHYS/QHP 5/>YRS: CPT | Performed by: STUDENT IN AN ORGANIZED HEALTH CARE EDUCATION/TRAINING PROGRAM

## 2025-07-10 PROCEDURE — A4649 SURGICAL SUPPLIES: HCPCS | Performed by: STUDENT IN AN ORGANIZED HEALTH CARE EDUCATION/TRAINING PROGRAM

## 2025-07-10 PROCEDURE — 63600175 PHARM REV CODE 636 W HCPCS: Performed by: STUDENT IN AN ORGANIZED HEALTH CARE EDUCATION/TRAINING PROGRAM

## 2025-07-10 RX ORDER — TRIAMCINOLONE ACETONIDE 40 MG/ML
INJECTION, SUSPENSION INTRA-ARTICULAR; INTRAMUSCULAR
Status: DISCONTINUED | OUTPATIENT
Start: 2025-07-10 | End: 2025-07-10 | Stop reason: HOSPADM

## 2025-07-10 RX ORDER — BUPIVACAINE HYDROCHLORIDE 2.5 MG/ML
INJECTION, SOLUTION EPIDURAL; INFILTRATION; INTRACAUDAL; PERINEURAL
Status: DISCONTINUED | OUTPATIENT
Start: 2025-07-10 | End: 2025-07-10 | Stop reason: HOSPADM

## 2025-07-10 RX ORDER — FENTANYL CITRATE 50 UG/ML
INJECTION, SOLUTION INTRAMUSCULAR; INTRAVENOUS
Status: DISCONTINUED | OUTPATIENT
Start: 2025-07-10 | End: 2025-07-10 | Stop reason: HOSPADM

## 2025-07-10 RX ORDER — LIDOCAINE HYDROCHLORIDE 20 MG/ML
INJECTION, SOLUTION INFILTRATION; PERINEURAL
Status: DISCONTINUED | OUTPATIENT
Start: 2025-07-10 | End: 2025-07-10 | Stop reason: HOSPADM

## 2025-07-10 RX ORDER — SODIUM CHLORIDE 9 MG/ML
INJECTION, SOLUTION INTRAVENOUS CONTINUOUS
Status: DISCONTINUED | OUTPATIENT
Start: 2025-07-10 | End: 2025-07-10 | Stop reason: HOSPADM

## 2025-07-10 RX ORDER — MIDAZOLAM HYDROCHLORIDE 1 MG/ML
INJECTION INTRAMUSCULAR; INTRAVENOUS
Status: DISCONTINUED | OUTPATIENT
Start: 2025-07-10 | End: 2025-07-10 | Stop reason: HOSPADM

## 2025-07-10 NOTE — DISCHARGE INSTRUCTIONS

## 2025-07-10 NOTE — OP NOTE
Therapeutic Genicular Cooled Nerve Radiofrequency Ablation under Fluoroscopy     The procedure, risks, benefits, and options were discussed with the patient. There are no contraindications to the procedure. The patent expressed understanding and agreed to the procedure. Informed written consent was obtained prior to the start of the procedure and can be found in the patient's chart.        PATIENT NAME: Sandra Lui   MRN: [id number]     DATE OF PROCEDURE: 07/10/2025     PROCEDURE: Therapeutic Right Genicular Cooled Nerve Radiofrequency Ablation under Fluoroscopy    PRE-OP DIAGNOSIS: Primary osteoarthritis of right knee [M17.11]  Chronic pain of right knee [M25.561, G89.29]  Chronic Knee Pain    POST-OP DIAGNOSIS: Same    PHYSICIAN: Jose Luis Joyce MD    ASSISTANTS: Nav Justin MD    MEDICATIONS INJECTED:  Preservative-free Kenalog 40mg with 9cc of Bupivicaine 0.25%    LOCAL ANESTHETIC INJECTED:   Xylocaine 2%    SEDATION: Versed 1mg and Fentanyl 100mcg                                                                                                                                                                                     Conscious sedation ordered by M.D. Patient re-evaluation prior to administration of conscious sedation. No changes noted in patient's status from initial evaluation. The patient's vital signs were monitored by RN and patient remained hemodynamically stable throughout the procedure.    Event Time In   Sedation Start 1035   Sedation End 1109       ESTIMATED BLOOD LOSS:  None    COMPLICATIONS:  None     INTERVAL HISTORY: Patient has clinical findings of chronic knee pain. Patients has completed 2 previous diagnostic genicular nerve blocks with at least 80% relief for the expected duration of the local anesthetic utilized.     TECHNIQUE: Time-out was performed to identify the patient and procedure to be performed. With the patient laying in a supine position, the surgical area was prepped and  draped in the usual sterile fashion using ChloraPrep and fenestrated drape. Three target sites including the superior lateral genicular nerve where the lateral femoral shaft meets the epicondyle, the superior medial genicular nerve where the medial femoral shaft meets the epicondyle, and the inferior medial genicular nerve where the medial tibial shaft meets the epicondyle, were determined under fluoroscopic guidance. Skin anesthesia was achieved by injecting Lidocaine 2% over the injection sites. A 17 gauge, 50mm, 10mm active tip needle was then advanced under fluoroscopy in the AP and lateral views into the positions of the geniculate nerves at these levels. This was followed by motor testing at each of the nerves to ensure that there was no dorsiflexion of the foot. After negative aspiration for blood was confirmed, 1 mL of the lidocaine 2% listed above was injected slowly at each site. This was followed by cooled thermal lesioning at 80 degrees celsius for 150 seconds at each site. That was followed by slowly injecting 1 mL of the medication mixture listed above at each site. The needles were removed and bleeding was nil. A sterile dressing was applied. No specimens collected. The patient tolerated the procedure well and did not have any procedure related motor deficit at the conclusion of the procedure.    The patient was monitored after the procedure in the recovery area. They were given post-procedure and discharge instructions to follow at home. The patient was discharged in a stable condition.    Jose Luis Joyce MD

## 2025-07-10 NOTE — DISCHARGE SUMMARY
Discharge Note  Short Stay      SUMMARY     Admit Date: 7/10/2025    Attending Physician: Jose Luis Joyce MD PhD    Discharge Physician: Jose Luis Joyce      Discharge Date: 7/10/2025 10:33 AM    Procedure(s) (LRB):  RADIOFREQUENCY ABLATION, RIGHT, GENICULAR (Right)    Final Diagnosis: Primary osteoarthritis of right knee [M17.11]  Chronic pain of right knee [M25.561, G89.29]    Disposition: Home or self care    Patient Instructions:   Current Discharge Medication List        CONTINUE these medications which have NOT CHANGED    Details   acetaminophen (TYLENOL) 650 MG TbSR Take 1 tablet (650 mg total) by mouth every 8 (eight) hours.  Qty: 120 tablet, Refills: 0    Associated Diagnoses: S/P total knee replacement, right      albuterol (PROVENTIL/VENTOLIN HFA) 90 mcg/actuation inhaler Inhale 1-2 puffs into the lungs every 6 (six) hours as needed for Wheezing or Shortness of Breath. Rescue  Qty: 18 g, Refills: 11    Associated Diagnoses: Asthma, unspecified asthma severity, unspecified whether complicated, unspecified whether persistent      ALBUTEROL, REFILL, INHL Inhale into the lungs. 2 puffs as needed      allopurinoL (ZYLOPRIM) 100 MG tablet Take 1.5 tablets (150 mg total) by mouth once daily.  Qty: 135 tablet, Refills: 1    Associated Diagnoses: Gout, unspecified cause, unspecified chronicity, unspecified site      aspirin (ECOTRIN) 81 MG EC tablet Take 1 tablet (81 mg total) by mouth 2 (two) times a day. Hold plavix for 1 week post op  Qty: 60 tablet, Refills: 0    Comments: Bedside delivery. Surgery 11/4. Same day  Associated Diagnoses: S/P total knee replacement, right      buPROPion (WELLBUTRIN XL) 150 MG TB24 tablet Take 150 mg by mouth once daily.      calcium carbonate (OS-NOBLE) 600 mg calcium (1,500 mg) Tab Take 1,200 mg by mouth once daily.      clobetasoL (TEMOVATE) 0.05 % external solution Apply 1 mL topically once daily.      clopidogreL (PLAVIX) 75 mg tablet Take 1 tablet (75 mg total) by mouth once  daily.  Qty: 30 tablet, Refills: 1      colchicine (COLCRYS) 0.6 mg tablet Take 0.6 mg by mouth daily as needed.      ergocalciferol, vitamin D2, (VITAMIN D ORAL) Take by mouth once daily.      famotidine (PEPCID) 40 MG tablet Take 1 tablet (40 mg total) by mouth once daily.  Qty: 30 tablet, Refills: 2      fluorometholone 0.1% (FML) 0.1 % DrpS Place 1 drop into both eyes Daily.      fluticasone propionate (FLONASE) 50 mcg/actuation nasal spray spray 1 spray (50 mcg total) by Each Nostril route 2 (two) times daily as needed for Rhinitis.  Qty: 16 g, Refills: 5    Associated Diagnoses: Seasonal allergic rhinitis due to other allergic trigger      furosemide (LASIX) 20 MG tablet Take 1 tablet (20 mg total) by mouth 2 (two) times daily.  Qty: 60 tablet, Refills: 2      gabapentin (NEURONTIN) 300 MG capsule Take 1 capsule (300 mg total) by mouth 3 (three) times daily.  Qty: 90 capsule, Refills: 2    Associated Diagnoses: Peripheral polyneuropathy      latanoprost 0.005 % ophthalmic solution Place 1 drop into both eyes every evening.      LIDOcaine (LIDODERM) 5 % Place 2 patches onto the skin daily as needed (bilateral knee pain). Remove & Discard patch within 12 hours or as directed by MD  Qty: 60 patch, Refills: 2    Associated Diagnoses: Pain due to total right knee replacement, initial encounter; Arthritis of left knee      losartan (COZAAR) 50 MG tablet Take 1 tablet (50 mg total) by mouth once daily.  Qty: 30 tablet, Refills: 2    Comments: .      metoprolol succinate (TOPROL-XL) 25 MG 24 hr tablet Take 25 mg by mouth once daily.      omega-3 fatty acids/fish oil (FISH OIL-OMEGA-3 FATTY ACIDS) 300-1,000 mg capsule Take by mouth once daily.      rosuvastatin (CRESTOR) 20 MG tablet Take 1 tablet (20 mg total) by mouth every evening.  Qty: 30 tablet, Refills: 2      semaglutide, weight loss, (WEGOVY) 0.25 mg/0.5 mL PnIj Inject 0.25 mg into the skin every 7 days.  Qty: 2 mL, Refills: 0    Associated Diagnoses: TIA  (transient ischemic attack); Carotid artery disease, unspecified laterality, unspecified type; Pre-diabetes; Polymyalgia; BMI 39.0-39.9,adult; Sleep apnea, unspecified type; Primary hypertension; Prediabetes; Stage 3 chronic kidney disease, unspecified whether stage 3a or 3b CKD; Depression, unspecified depression type      vitamin D (VITAMIN D3) 1000 units Tab Take 1,000 Units by mouth once daily.                 Discharge Diagnosis: Primary osteoarthritis of right knee [M17.11]  Chronic pain of right knee [M25.561, G89.29]  Condition on Discharge: Stable with no complications to procedure   Diet on Discharge: Same as before.  Activity: as per instruction sheet.  Discharge to: Home with a responsible adult.  Follow up: 2-4 weeks       Please call my office or pager at [phone] if experienced any weakness or loss of sensation, fever > 101.5, pain uncontrolled with oral medications, persistent nausea/vomiting/or diarrhea, redness or drainage from the incisions, or any other worrisome concerns. If physician on call was not reached or could not communicate with our office for any reason please go to the nearest emergency department      Jose Luis Joyce MD PhD

## 2025-07-14 DIAGNOSIS — R73.03 PREDIABETES: ICD-10-CM

## 2025-07-14 DIAGNOSIS — M35.3 POLYMYALGIA: ICD-10-CM

## 2025-07-14 DIAGNOSIS — G45.9 TIA (TRANSIENT ISCHEMIC ATTACK): ICD-10-CM

## 2025-07-14 DIAGNOSIS — F32.A DEPRESSION, UNSPECIFIED DEPRESSION TYPE: ICD-10-CM

## 2025-07-14 DIAGNOSIS — R73.03 PRE-DIABETES: ICD-10-CM

## 2025-07-14 DIAGNOSIS — I10 PRIMARY HYPERTENSION: ICD-10-CM

## 2025-07-14 DIAGNOSIS — G47.30 SLEEP APNEA, UNSPECIFIED TYPE: ICD-10-CM

## 2025-07-14 DIAGNOSIS — I77.9 CAROTID ARTERY DISEASE, UNSPECIFIED LATERALITY, UNSPECIFIED TYPE: ICD-10-CM

## 2025-07-14 DIAGNOSIS — N18.30 STAGE 3 CHRONIC KIDNEY DISEASE, UNSPECIFIED WHETHER STAGE 3A OR 3B CKD: ICD-10-CM

## 2025-07-15 ENCOUNTER — DOCUMENTATION ONLY (OUTPATIENT)
Dept: REHABILITATION | Facility: HOSPITAL | Age: 75
End: 2025-07-15
Payer: MEDICARE

## 2025-07-15 RX ORDER — CLOPIDOGREL BISULFATE 75 MG/1
75 TABLET ORAL DAILY
Qty: 30 TABLET | Refills: 1 | Status: SHIPPED | OUTPATIENT
Start: 2025-07-15

## 2025-07-15 RX ORDER — SEMAGLUTIDE 0.25 MG/.5ML
0.25 INJECTION, SOLUTION SUBCUTANEOUS
Qty: 2 ML | Refills: 0 | Status: SHIPPED | OUTPATIENT
Start: 2025-07-15 | End: 2025-08-13

## 2025-07-15 NOTE — PROGRESS NOTES
Physical Therapy: No show/Cancellation of Visit  Date: 07/15/2025    Patient was a cancel to today's PT appointment. Reason for cancellation: daughter having surgery via phone call. Patient's next scheduled appointment is Thursday, 7/17/2025 at 8AM.    Cancel: 2 (non-consecutive)  No show: 0     Therapist: Cristy Cordova PTA

## 2025-07-18 ENCOUNTER — TELEPHONE (OUTPATIENT)
Dept: ORTHOPEDICS | Facility: CLINIC | Age: 75
End: 2025-07-18
Payer: MEDICARE

## 2025-07-18 NOTE — TELEPHONE ENCOUNTER
Called pt and confirmed the date and time of her 2 appts. Pt verbalized understanding and has no further questions.  Copied from CRM #[id number]. Topic: Appointments - Appointment Access  >> Jul 18, 2025  8:27 AM Sherly wrote:  Name of Caller: Ms. Patel     Nature of Call: Pt is wanting a call back to confirm her appts on 8/6 and 8/8     Best Call Back Number: Confirmed contact info below:  Contact Name: Sandra Lui  Phone Number: [phone]      Additional Information: NA

## 2025-07-23 ENCOUNTER — CLINICAL SUPPORT (OUTPATIENT)
Dept: REHABILITATION | Facility: HOSPITAL | Age: 75
End: 2025-07-23
Payer: MEDICARE

## 2025-07-23 DIAGNOSIS — M25.561 CHRONIC PAIN OF RIGHT KNEE: Primary | ICD-10-CM

## 2025-07-23 DIAGNOSIS — G89.29 CHRONIC PAIN OF RIGHT KNEE: Primary | ICD-10-CM

## 2025-07-23 PROCEDURE — 97112 NEUROMUSCULAR REEDUCATION: CPT

## 2025-07-23 PROCEDURE — 97530 THERAPEUTIC ACTIVITIES: CPT

## 2025-07-23 NOTE — PROGRESS NOTES
Outpatient Rehab    Physical Therapy Progress Note    Patient Name: Amanda Lui  MRN: [id number]  YOB: 1950  Encounter Date: 7/23/2025    Therapy Diagnosis:   Encounter Diagnosis   Name Primary?    Chronic pain of right knee Yes     Physician: Sha Duran MD    Physician Orders: Eval and Treat  Medical Diagnosis: Presence of right artificial knee joint  Pain in right knee  Morbid (severe) obesity due to excess calories  Surgical Diagnosis: Right TKA   Surgical Date: 11/5/2024  Days Since Last Surgery: 260    Visit # / Visits Authorized:  3 / 10  Insurance Authorization Period: 6/19/2025 to 12/31/2025  Date of Evaluation: 6/24/2025  Plan of Care Certification: 6/25/2025 to 8/29/2025      PT/PTA: PT   Number of PTA visits since last PT visit:0  Time In: 0900   Time Out: 0956  Total Time (in minutes): 56   Total Billable Time (in minutes):  56     FOTO:  Intake Score (%): 36  Survey Score 2 (%): 74  Survey Score 3 (%): Not applicable for this Episode    Precautions:  Additional Precautions and Protocol Details: Standard    Subjective   Patient reports doing great since the nerve ablation. She has missed the last two visits because of her daughter having surgery and a friend of hers passing away.         Objective       Knee Range of Motion   Right Knee   Active (deg) Passive (deg) Pain   Flexion 113       Extension 2 (hyperextension)                       Sit to Stand Testing      The patient completed 7 repetitions of a sit to stand transfer in 30 seconds.           Gait Analysis  Base of Support: Wide  Walking Speed: Decreased    Right Foot Contact Pattern: Flat foot    Left Foot Contact Pattern: Flat foot         Treatment:  Balance/Neuromuscular Re-Education  NMR 1: Education on prognosis, plan of care, home exercise program, exercise justification, involved anatomy, swelling/pain management, use of starchy towel for densitization  NMR 2: LAQs (4#): 3 x 10 reps each leg  NMR 5: Standing hip  "abduction on blue airex: 3 x 10 reps each leg  NMR 6: Independent ambulation in clinic ~100 feet: cues for increased step length and heel toe pattern  NMR 7: SLRs: 2 x 10 reps each leg  Therapeutic Activity  TA 3: shuttle double leg press: 75 lbs, 3 x 10 reps -- single leg press: 25 lbs, 3 x 10 reps each  TA 4: Wil navigation 12": 10 laps forward, 10 laps lateral    Time Entry(in minutes):  Neuromuscular Re-Education Time Entry: 31  Therapeutic Activity Time Entry: 25    Assessment & Plan   Assessment: Amanda returns to PT with reports of no pain in right knee since ablation. Upon reassessment, she demonstrates improved functional mobility with decreased pain reports. Chief complaint is her imbalance in gait. Demonstrates extensor lag on contralateral leg (left). Introduced single leg exercises for her to perform at home. Will continue to progress as tolerated.   Evaluation/Treatment Tolerance: Patient tolerated treatment well    The patient will continue to benefit from skilled outpatient physical therapy in order to address the deficits listed in the problem list on the initial evaluation, provide patient and family education, and maximize the patients level of independence in the home and community environments.     The patient's spiritual, cultural, and educational needs were considered, and the patient is agreeable to the plan of care and goals.           Plan: Implement hip and bilateral quad strengthening    Goals:   Active       Ambulation/movement       Patient will walk for 20 minutes with </= 3/10 reported pain to demonstrate improved functional mobility (Ongoing)       Start:  06/25/25    Expected End:  08/29/25            Patient will climb one flight of stairs with </= 3/10 reported pain to demonstrate improved functional strength (Ongoing)       Start:  06/25/25    Expected End:  08/29/25               Functional outcome       Patient will show a significant change in FOTO patient-reported outcome " tool to demonstrate subjective improvement (Ongoing)       Start:  06/25/25    Expected End:  08/29/25            Patient stated goal: to decrease knee pain and return to prior level of function (Ongoing)       Start:  06/25/25    Expected End:  08/29/25            Patient will demonstrate independence in home program for support of progression (Ongoing)       Start:  06/25/25    Expected End:  07/30/25               Pain       Patient will report a 2 point reduction in pain while performing physical therapy (Ongoing)       Start:  06/25/25    Expected End:  07/30/25                Bita Caicedo, PT, DPT

## 2025-07-30 ENCOUNTER — CLINICAL SUPPORT (OUTPATIENT)
Dept: REHABILITATION | Facility: HOSPITAL | Age: 75
End: 2025-07-30
Payer: MEDICARE

## 2025-07-30 DIAGNOSIS — M25.561 CHRONIC PAIN OF RIGHT KNEE: Primary | ICD-10-CM

## 2025-07-30 DIAGNOSIS — G89.29 CHRONIC PAIN OF RIGHT KNEE: Primary | ICD-10-CM

## 2025-07-30 PROCEDURE — 97530 THERAPEUTIC ACTIVITIES: CPT | Mod: CQ

## 2025-07-30 NOTE — PROGRESS NOTES
Outpatient Rehab    Physical Therapy Visit    Patient Name: Amanda Lui  MRN: [id number]  YOB: 1950  Encounter Date: 7/30/2025    Therapy Diagnosis:   Encounter Diagnosis   Name Primary?    Chronic pain of right knee Yes     Physician: Sha Duran MD    Physician Orders: Eval and Treat  Medical Diagnosis: Unilateral primary osteoarthritis, right knee  Surgical Diagnosis: Right TKA   Surgical Date: 11/5/2024  Days Since Last Surgery: 267    Visit # / Visits Authorized:  1 / 10  Insurance Authorization Period: 1/1/2025 to 12/31/2025  Date of Evaluation: 6/24/2025  Plan of Care Certification: 6/25/2025 to 8/29/2025      PT/PTA: PTA   Number of PTA visits since last PT visit:1  Time In: 0855   Time Out: 0957  Total Time (in minutes): 62   Total Billable Time (in minutes): 35    FOTO:  Intake Score (%): 36  Survey Score 2 (%): 74  Survey Score 3 (%): Not applicable for this Episode    Precautions:  Additional Precautions and Protocol Details: Standard      Subjective   Patient reports increased Left hip pain. States that her knee is doing good, no pain today.  Pain reported as 0/10. Bilateral knees, R > L; Left hip, 6/10 pain    Objective  Objective Measures updated at progress report unless specified.     Treatment:  Balance/Neuromuscular Re-Education  NMR 1: Education on prognosis, plan of care, home exercise program, exercise justification, involved anatomy, swelling/pain management  NMR 2: LAQs (4#): 3 x 10 reps each leg  NMR 3: hook lying bridges, blue tb: 5 sec hold, 3 x 10 reps  NMR 4: hook lying clamshells: blue tb, 10 sec hold, 3 x 10 reps  NMR 6: Independent ambulation in clinic ~100 feet: cues for increased step length and heel toe pattern  NMR 7: SLRs: 2 x 10 reps each leg  Therapeutic Activity  TA 1: Nustep for CV and LE endurance: 10 minutes, Level 2 (Goal: 1,000K steps)  TA 2: Patient education: DOMS, load progression  TA 3: shuttle double leg press: 75 lbs, 3 x 10 reps -- single leg  "press: 25 lbs, 3 x 10 reps each  TA 4: Wil navigation 9" (reciprocal): 10 laps forward, 10 laps lateral    Time Entry(in minutes):  Neuromuscular Re-Education Time Entry: 32  Therapeutic Activity Time Entry: 30    Assessment & Plan   Assessment: Overall good tolerance to therapeutic interventions noting adequate muscle response throughout. Notes increased Left hip discomfort with SLR's, however able to complete 2 sets of 10 reps.   Evaluation/Treatment Tolerance: Patient tolerated treatment well    The patient will continue to benefit from skilled outpatient physical therapy in order to address the deficits listed in the problem list on the initial evaluation, provide patient and family education, and maximize the patients level of independence in the home and community environments.     The patient's spiritual, cultural, and educational needs were considered, and the patient is agreeable to the plan of care and goals.           Plan: Will continue per POC towards treatment goals. PT/PTA met face to face to discuss patient's treatment plan and progress towards established goals. Patient will be seen by physical therapist every sixth visit and minimally once per month.    Goals:   Active       Ambulation/movement       Patient will walk for 20 minutes with </= 3/10 reported pain to demonstrate improved functional mobility (Progressing)       Start:  06/25/25    Expected End:  08/29/25            Patient will climb one flight of stairs with </= 3/10 reported pain to demonstrate improved functional strength (Progressing)       Start:  06/25/25    Expected End:  08/29/25               Functional outcome       Patient will show a significant change in FOTO patient-reported outcome tool to demonstrate subjective improvement (Progressing)       Start:  06/25/25    Expected End:  08/29/25            Patient stated goal: to decrease knee pain and return to prior level of function (Progressing)       Start:  06/25/25    " Expected End:  08/29/25            Patient will demonstrate independence in home program for support of progression (Progressing)       Start:  06/25/25    Expected End:  07/30/25               Pain       Patient will report a 2 point reduction in pain while performing physical therapy (Progressing)       Start:  06/25/25    Expected End:  07/30/25                Cristy Cordova PTA

## 2025-08-04 RX ORDER — LOSARTAN POTASSIUM 50 MG/1
50 TABLET ORAL DAILY
Qty: 30 TABLET | Refills: 2 | Status: SHIPPED | OUTPATIENT
Start: 2025-08-04

## 2025-08-05 ENCOUNTER — CLINICAL SUPPORT (OUTPATIENT)
Dept: REHABILITATION | Facility: HOSPITAL | Age: 75
End: 2025-08-05
Payer: MEDICARE

## 2025-08-05 DIAGNOSIS — M25.561 CHRONIC PAIN OF RIGHT KNEE: Primary | ICD-10-CM

## 2025-08-05 DIAGNOSIS — G89.29 CHRONIC PAIN OF RIGHT KNEE: Primary | ICD-10-CM

## 2025-08-05 PROCEDURE — 97530 THERAPEUTIC ACTIVITIES: CPT | Mod: CQ

## 2025-08-05 NOTE — PROGRESS NOTES
"  Outpatient Rehab    Physical Therapy Visit    Patient Name: Amanda Lui  MRN: [id number]  YOB: 1950  Encounter Date: 8/5/2025    Therapy Diagnosis:   Encounter Diagnosis   Name Primary?    Chronic pain of right knee Yes     Physician: Sha Duran MD    Physician Orders: Eval and Treat  Medical Diagnosis: Unilateral primary osteoarthritis, right knee  Surgical Diagnosis: Right TKA   Surgical Date: 11/5/2024  Days Since Last Surgery: 273    Visit # / Visits Authorized:  2 / 10  Insurance Authorization Period: 1/1/2025 to 12/31/2025  Date of Evaluation: 6/24/2025  Plan of Care Certification: 6/25/2025 to 8/29/2025      PT/PTA: PTA   Number of PTA visits since last PT visit:2  Time In: 0800   Time Out: 0901  Total Time (in minutes): 61   Total Billable Time (in minutes): 30    FOTO:  Intake Score (%): 36  Survey Score 2 (%): 74  Survey Score 3 (%): Not applicable for this Episode    Precautions:  Additional Precautions and Protocol Details: Standard      Subjective   Patient reports her knee and hip are doing well, she denies pain this morning.  Pain reported as 0/10. Bilateral knees, R > L    Objective  Objective Measures updated at progress report unless specified.     Treatment:  Balance/Neuromuscular Re-Education  NMR 1: Education on prognosis, plan of care, home exercise program, exercise justification, involved anatomy, swelling/pain management  NMR 2: LAQs (4#): 3 x 10 reps each leg  NMR 5: standing hip abduction on blue airex: 3 x 10 reps each leg  Therapeutic Activity  TA 1: Nustep for CV and LE endurance: 10 minutes, Level 2 (Goal: 1,000K steps)  TA 2: Patient education: DOMS, load progression  TA 3: shuttle double leg press: 75 lbs, 3 x 10 reps -- single leg press: 75 lbs, 3 x 10 reps each  TA 4: rosa maria navigation 9" (reciprocal): 10 laps forward, 10 laps lateral  TA 5: step ups onto 6-inch step: 3 x 10 reps leading with RLE -- lateral step ups onto 6-inch step: 3 x 10 reps  TA 6: matrix " knee flexion, 35 lbs: 3 x 10 reps    Time Entry(in minutes):  Neuromuscular Re-Education Time Entry: 12  Therapeutic Activity Time Entry: 49    Assessment & Plan   Assessment: Overall good tolerance to therapeutic interventions noting adequate muscle response throughout. Notes increased hip and glut fatigue.  Evaluation/Treatment Tolerance: Patient tolerated treatment well    The patient will continue to benefit from skilled outpatient physical therapy in order to address the deficits listed in the problem list on the initial evaluation, provide patient and family education, and maximize the patients level of independence in the home and community environments.     The patient's spiritual, cultural, and educational needs were considered, and the patient is agreeable to the plan of care and goals.           Plan: Will continue per POC towards treatment goals. PT/PTA met face to face to discuss patient's treatment plan and progress towards established goals. Patient will be seen by physical therapist every sixth visit and minimally once per month.    Goals:   Active       Ambulation/movement       Patient will walk for 20 minutes with </= 3/10 reported pain to demonstrate improved functional mobility (Progressing)       Start:  06/25/25    Expected End:  08/29/25            Patient will climb one flight of stairs with </= 3/10 reported pain to demonstrate improved functional strength (Progressing)       Start:  06/25/25    Expected End:  08/29/25               Functional outcome       Patient will show a significant change in FOTO patient-reported outcome tool to demonstrate subjective improvement (Progressing)       Start:  06/25/25    Expected End:  08/29/25            Patient stated goal: to decrease knee pain and return to prior level of function (Progressing)       Start:  06/25/25    Expected End:  08/29/25            Patient will demonstrate independence in home program for support of progression (Progressing)        Start:  06/25/25    Expected End:  07/30/25               Pain       Patient will report a 2 point reduction in pain while performing physical therapy (Progressing)       Start:  06/25/25    Expected End:  07/30/25                Cristy Cordova PTA

## 2025-08-06 ENCOUNTER — PATIENT MESSAGE (OUTPATIENT)
Dept: PAIN MEDICINE | Facility: CLINIC | Age: 75
End: 2025-08-06
Payer: MEDICARE

## 2025-08-06 ENCOUNTER — OFFICE VISIT (OUTPATIENT)
Dept: ORTHOPEDICS | Facility: CLINIC | Age: 75
End: 2025-08-06
Payer: MEDICARE

## 2025-08-06 ENCOUNTER — HOSPITAL ENCOUNTER (OUTPATIENT)
Dept: RADIOLOGY | Facility: HOSPITAL | Age: 75
Discharge: HOME | End: 2025-08-06
Attending: ORTHOPAEDIC SURGERY
Payer: MEDICARE

## 2025-08-06 VITALS — HEIGHT: 67 IN | WEIGHT: 248.88 LBS | BODY MASS INDEX: 39.06 KG/M2

## 2025-08-06 DIAGNOSIS — M25.552 LEFT HIP PAIN: Primary | ICD-10-CM

## 2025-08-06 DIAGNOSIS — M25.561 CHRONIC PAIN OF RIGHT KNEE: ICD-10-CM

## 2025-08-06 DIAGNOSIS — M25.552 LEFT HIP PAIN: ICD-10-CM

## 2025-08-06 DIAGNOSIS — G89.29 CHRONIC PAIN OF RIGHT KNEE: ICD-10-CM

## 2025-08-06 DIAGNOSIS — Z96.651 S/P TOTAL KNEE REPLACEMENT, RIGHT: ICD-10-CM

## 2025-08-06 DIAGNOSIS — M16.12 PRIMARY OSTEOARTHRITIS OF LEFT HIP: Primary | ICD-10-CM

## 2025-08-06 PROCEDURE — 73502 X-RAY EXAM HIP UNI 2-3 VIEWS: CPT | Mod: TC,LT

## 2025-08-06 PROCEDURE — 1159F MED LIST DOCD IN RCRD: CPT | Mod: CPTII,S$GLB,, | Performed by: ORTHOPAEDIC SURGERY

## 2025-08-06 PROCEDURE — 73502 X-RAY EXAM HIP UNI 2-3 VIEWS: CPT | Mod: 26,LT,, | Performed by: RADIOLOGY

## 2025-08-06 PROCEDURE — 99213 OFFICE O/P EST LOW 20 MIN: CPT | Mod: S$GLB,,, | Performed by: ORTHOPAEDIC SURGERY

## 2025-08-06 PROCEDURE — 1101F PT FALLS ASSESS-DOCD LE1/YR: CPT | Mod: CPTII,S$GLB,, | Performed by: ORTHOPAEDIC SURGERY

## 2025-08-06 PROCEDURE — 1125F AMNT PAIN NOTED PAIN PRSNT: CPT | Mod: CPTII,S$GLB,, | Performed by: ORTHOPAEDIC SURGERY

## 2025-08-06 PROCEDURE — 3288F FALL RISK ASSESSMENT DOCD: CPT | Mod: CPTII,S$GLB,, | Performed by: ORTHOPAEDIC SURGERY

## 2025-08-06 PROCEDURE — 99999 PR PBB SHADOW E&M-EST. PATIENT-LVL III: CPT | Mod: PBBFAC,,, | Performed by: ORTHOPAEDIC SURGERY

## 2025-08-06 NOTE — PROGRESS NOTES
"Subjective:      Patient ID: Sandra Lui is a 75 y.o. female.    Chief Complaint: Pain of the Right Knee and Pain of the Left Hip      HPI    Sandra Lui has left hip pain.  In his in the hip joint reason it has been present for the past several weeks.  There was no trauma.  It is getting slightly worse.  There is some radiation to the thigh.  The RFA for her knee pain helped a great deal however she still notes some numbness about the knee.    Review of Systems   Constitutional: Negative for chills, fever and night sweats.   HENT:  Negative for hearing loss.    Eyes:  Negative for blurred vision and double vision.   Cardiovascular:  Negative for chest pain, claudication and leg swelling.   Respiratory:  Negative for shortness of breath.    Endocrine: Negative for polydipsia, polyphagia and polyuria.   Hematologic/Lymphatic: Negative for adenopathy and bleeding problem. Does not bruise/bleed easily.   Skin:  Negative for poor wound healing.   Gastrointestinal:  Negative for diarrhea and heartburn.   Genitourinary:  Negative for bladder incontinence.   Neurological:  Negative for focal weakness, headaches, numbness, paresthesias and sensory change.   Psychiatric/Behavioral:  The patient is not nervous/anxious.    Allergic/Immunologic: Negative for persistent infections.         Objective:      Body mass index is 38.98 kg/m².  Vitals:    08/06/25 0922   Weight: 112.9 kg (248 lb 14.4 oz)   Height: 5' 7" (1.702 m)           General    Constitutional: She is oriented to person, place, and time. She appears well-developed and well-nourished.   HENT:   Head: Normocephalic and atraumatic.   Eyes: EOM are normal.   Cardiovascular:  Normal rate.            Pulmonary/Chest: Effort normal.   Neurological: She is alert and oriented to person, place, and time.   Psychiatric: She has a normal mood and affect. Her behavior is normal.     General Musculoskeletal Exam   Gait: abnormal and antalgic       Right Knee Exam " "    Inspection   Erythema: absent  Scars: present  Swelling: absent  Effusion: absent  Deformity: absent  Bruising: absent    Tenderness   The patient is experiencing no tenderness.     Range of Motion   Extension:  0   Flexion:  120     Tests   Ligament Examination   Lachman: normal (-1 to 2mm)   MCL - Valgus: normal (0 to 2mm)  LCL - Varus: normal    Other   Sensation: decreased (lateral leg)    Left Knee Exam     Inspection   Alignment:  normal  Effusion: absentLeft Hip Exam     Inspection   Scars: absent  Swelling: absent  No deformity of hip.  Quadriceps Atrophy:  negative  Erythema: absent  Bruising: absent    Range of Motion   Abduction:  20   Adduction:  10   Extension:  0   Flexion:  100   External rotation:  20   Internal rotation: 20     Tests   Stinchfield test: positive      Back (L-Spine & T-Spine) / Neck (C-Spine) Exam     Back (L-Spine & T-Spine) Range of Motion   The patient has abnormal back ROM.      Muscle Strength   Right Lower Extremity   Hip Abduction: 5/5   Quadriceps:  5/5   Hamstrin/5   Left Lower Extremity   Hip Abduction: 5/5   Hip Adduction: 5/5   Hip Flexion: 5/5   Ankle Dorsiflexion:  5/5     Vascular Exam       Edema  Right Lower Leg: absent  Left Upper Leg: present    Radiographs obtained today and reviewed by me demonstrate Pérez grade 2 arthritic change of the left hip.  There is significant articular cartilage loss medially.  There has been progression from her prior x-ray.          Assessment:       Encounter Diagnoses   Name Primary?    Primary osteoarthritis of left hip Yes    Chronic pain of right knee     S/P total knee replacement, right           Plan:       Sandra SWENSON "Amanda" was seen today for pain and pain.    Diagnoses and all orders for this visit:    Primary osteoarthritis of left hip    Chronic pain of right knee    S/P total knee replacement, right        Options were discussed.  We will send herfor an intra-articular left hip injection.  I will see her back " following this to see how much it has helped

## 2025-08-10 DIAGNOSIS — G47.30 SLEEP APNEA, UNSPECIFIED TYPE: ICD-10-CM

## 2025-08-10 DIAGNOSIS — G45.9 TIA (TRANSIENT ISCHEMIC ATTACK): ICD-10-CM

## 2025-08-10 DIAGNOSIS — F32.A DEPRESSION, UNSPECIFIED DEPRESSION TYPE: ICD-10-CM

## 2025-08-10 DIAGNOSIS — R73.03 PRE-DIABETES: ICD-10-CM

## 2025-08-10 DIAGNOSIS — N18.30 STAGE 3 CHRONIC KIDNEY DISEASE, UNSPECIFIED WHETHER STAGE 3A OR 3B CKD: ICD-10-CM

## 2025-08-10 DIAGNOSIS — I10 PRIMARY HYPERTENSION: ICD-10-CM

## 2025-08-10 DIAGNOSIS — M35.3 POLYMYALGIA: ICD-10-CM

## 2025-08-10 DIAGNOSIS — I77.9 CAROTID ARTERY DISEASE, UNSPECIFIED LATERALITY, UNSPECIFIED TYPE: ICD-10-CM

## 2025-08-10 DIAGNOSIS — R73.03 PREDIABETES: ICD-10-CM

## 2025-08-11 PROBLEM — M25.561 RIGHT KNEE PAIN: Status: RESOLVED | Noted: 2025-06-24 | Resolved: 2025-08-11

## 2025-08-11 RX ORDER — SEMAGLUTIDE 0.25 MG/.5ML
0.25 INJECTION, SOLUTION SUBCUTANEOUS
Qty: 2 ML | Refills: 0 | Status: SHIPPED | OUTPATIENT
Start: 2025-08-11 | End: 2025-09-11

## 2025-08-14 ENCOUNTER — OFFICE VISIT (OUTPATIENT)
Dept: PAIN MEDICINE | Facility: CLINIC | Age: 75
End: 2025-08-14
Payer: MEDICARE

## 2025-08-14 VITALS
HEART RATE: 66 BPM | WEIGHT: 251.31 LBS | OXYGEN SATURATION: 99 % | BODY MASS INDEX: 39.44 KG/M2 | DIASTOLIC BLOOD PRESSURE: 68 MMHG | SYSTOLIC BLOOD PRESSURE: 116 MMHG | TEMPERATURE: 98 F | RESPIRATION RATE: 19 BRPM | HEIGHT: 67 IN

## 2025-08-14 DIAGNOSIS — M79.18 MYOFASCIAL PAIN: ICD-10-CM

## 2025-08-14 DIAGNOSIS — M25.552 PAIN IN JOINT OF LEFT HIP: ICD-10-CM

## 2025-08-14 DIAGNOSIS — M70.62 GREATER TROCHANTERIC BURSITIS OF LEFT HIP: ICD-10-CM

## 2025-08-14 DIAGNOSIS — T84.84XD PAIN DUE TO TOTAL RIGHT KNEE REPLACEMENT, SUBSEQUENT ENCOUNTER: ICD-10-CM

## 2025-08-14 DIAGNOSIS — G89.4 CHRONIC PAIN SYNDROME: ICD-10-CM

## 2025-08-14 DIAGNOSIS — Z96.651 PAIN DUE TO TOTAL RIGHT KNEE REPLACEMENT, SUBSEQUENT ENCOUNTER: ICD-10-CM

## 2025-08-14 DIAGNOSIS — M16.12 PRIMARY OSTEOARTHRITIS OF LEFT HIP: Primary | ICD-10-CM

## 2025-08-14 DIAGNOSIS — Z96.651 S/P TOTAL KNEE REPLACEMENT, RIGHT: ICD-10-CM

## 2025-08-14 PROCEDURE — 1160F RVW MEDS BY RX/DR IN RCRD: CPT | Mod: CPTII,S$GLB,,

## 2025-08-14 PROCEDURE — 3074F SYST BP LT 130 MM HG: CPT | Mod: CPTII,S$GLB,,

## 2025-08-14 PROCEDURE — 3288F FALL RISK ASSESSMENT DOCD: CPT | Mod: CPTII,S$GLB,,

## 2025-08-14 PROCEDURE — 1159F MED LIST DOCD IN RCRD: CPT | Mod: CPTII,S$GLB,,

## 2025-08-14 PROCEDURE — 99999 PR PBB SHADOW E&M-EST. PATIENT-LVL V: CPT | Mod: PBBFAC,,,

## 2025-08-14 PROCEDURE — 1101F PT FALLS ASSESS-DOCD LE1/YR: CPT | Mod: CPTII,S$GLB,,

## 2025-08-14 PROCEDURE — 3078F DIAST BP <80 MM HG: CPT | Mod: CPTII,S$GLB,,

## 2025-08-14 PROCEDURE — 99214 OFFICE O/P EST MOD 30 MIN: CPT | Mod: S$GLB,,,

## 2025-08-14 PROCEDURE — 1125F AMNT PAIN NOTED PAIN PRSNT: CPT | Mod: CPTII,S$GLB,,

## 2025-08-18 RX ORDER — FUROSEMIDE 20 MG/1
20 TABLET ORAL 2 TIMES DAILY
Qty: 60 TABLET | Refills: 2 | Status: SHIPPED | OUTPATIENT
Start: 2025-08-18

## 2025-08-27 ENCOUNTER — CLINICAL SUPPORT (OUTPATIENT)
Dept: REHABILITATION | Facility: HOSPITAL | Age: 75
End: 2025-08-27
Payer: MEDICARE

## 2025-08-27 DIAGNOSIS — G89.29 CHRONIC PAIN OF RIGHT KNEE: Primary | ICD-10-CM

## 2025-08-27 DIAGNOSIS — M25.561 CHRONIC PAIN OF RIGHT KNEE: Primary | ICD-10-CM

## 2025-08-27 PROCEDURE — 97530 THERAPEUTIC ACTIVITIES: CPT | Mod: CQ

## 2025-08-28 ENCOUNTER — PATIENT MESSAGE (OUTPATIENT)
Dept: PAIN MEDICINE | Facility: OTHER | Age: 75
End: 2025-08-28
Payer: MEDICARE

## 2025-08-28 PROBLEM — M79.7 FIBROMYALGIA: Status: ACTIVE | Noted: 2025-02-10

## 2025-08-28 PROBLEM — Z86.73 HX-TIA (TRANSIENT ISCHEMIC ATTACK): Status: ACTIVE | Noted: 2025-02-10

## 2025-08-28 PROBLEM — N18.31 CHRONIC KIDNEY DISEASE, STAGE 3A: Status: ACTIVE | Noted: 2024-10-23

## 2025-08-28 PROBLEM — G89.29 CHRONIC PAIN OF BOTH KNEES: Status: ACTIVE | Noted: 2021-05-19

## 2025-08-28 PROBLEM — Z86.718 HISTORY OF DVT OF LOWER EXTREMITY: Status: ACTIVE | Noted: 2025-02-10

## 2025-08-28 PROBLEM — I50.32 CHRONIC DIASTOLIC HEART FAILURE: Status: ACTIVE | Noted: 2025-02-10

## 2025-08-28 PROBLEM — M17.0 PRIMARY OSTEOARTHRITIS OF BOTH KNEES: Status: ACTIVE | Noted: 2023-10-25

## 2025-08-28 PROBLEM — M70.62 TROCHANTERIC BURSITIS OF LEFT HIP: Status: ACTIVE | Noted: 2021-05-19

## 2025-08-28 PROBLEM — M25.561 CHRONIC PAIN OF BOTH KNEES: Status: ACTIVE | Noted: 2021-05-19

## 2025-08-28 PROBLEM — M25.562 CHRONIC PAIN OF BOTH KNEES: Status: ACTIVE | Noted: 2021-05-19

## 2025-08-29 ENCOUNTER — TELEPHONE (OUTPATIENT)
Dept: PAIN MEDICINE | Facility: CLINIC | Age: 75
End: 2025-08-29
Payer: MEDICARE

## (undated) DEVICE — WRAP KNEE ACCU THERM GEL PACK

## (undated) DEVICE — PIN BONE 4X110MM
Type: IMPLANTABLE DEVICE | Site: KNEE | Status: NON-FUNCTIONAL
Removed: 2024-11-05

## (undated) DEVICE — SUT STRATAFIX 3-0 30CM

## (undated) DEVICE — SUT VICRYL PLUS 2-0 CT1 18

## (undated) DEVICE — BLADE MAKO STANDARD

## (undated) DEVICE — BLADE SURG CARBON STEEL SZ11

## (undated) DEVICE — WRAP PROTECTIVE LEG POS STRL

## (undated) DEVICE — DRAPE T EXTRM SURG 121X128X90

## (undated) DEVICE — ELECTRODE REM PLYHSV RETURN 9

## (undated) DEVICE — KIT DRAPE RIO ONE PIECE W/POCK

## (undated) DEVICE — PAD DERMAPROX SM 8X11X.5IN

## (undated) DEVICE — DRESSING AQUACEL AG RBBN 2X45

## (undated) DEVICE — MIXER BONE CEMENT

## (undated) DEVICE — DRAPE STERI INSTRUMENT 1018

## (undated) DEVICE — PIN BONE 4 X 140MM STERILE
Type: IMPLANTABLE DEVICE | Site: KNEE | Status: NON-FUNCTIONAL
Removed: 2024-11-05

## (undated) DEVICE — GOWN ECLIPSE POLY REINF 3XL

## (undated) DEVICE — TOURNIQUET SB QC DP 34X4IN

## (undated) DEVICE — DRAPE INCISE IOBAN 2 23X33IN

## (undated) DEVICE — BLADE SAGITTAL 18 X 1.27 X 90M

## (undated) DEVICE — DRESSING TELFA N ADH 3X8

## (undated) DEVICE — TAPE SURG DURAPORE 2 X10YD

## (undated) DEVICE — SUT VICRYL+ 1 CT1 18IN

## (undated) DEVICE — SOL BETADINE 5%

## (undated) DEVICE — DRESSING XEROFORM 1X8IN

## (undated) DEVICE — COVER LIGHT HANDLE 80/CA

## (undated) DEVICE — HOOD T7 W/ PEEL AWAY LENS

## (undated) DEVICE — SOL NACL IRR 1000ML BTL

## (undated) DEVICE — KIT IRR SUCTION HND PIECE

## (undated) DEVICE — UNDERGLOVES BIOGEL PI SIZE 8.5

## (undated) DEVICE — ADHESIVE DERMABOND ADVANCED

## (undated) DEVICE — KIT VIZADISC KNEE TRACKING

## (undated) DEVICE — COVER CAMERA OPERATING ROOM

## (undated) DEVICE — KIT CHECKPOINT MAKO

## (undated) DEVICE — DRESSING TRANS 4X4 TEGADERM

## (undated) DEVICE — DRAPE THREE-QTR REINF 53X77IN

## (undated) DEVICE — SUT VICRYL PLUS 0 CT1 18IN

## (undated) DEVICE — SET CEMENT (SCULP)

## (undated) DEVICE — GLOVE BIOGEL SKINSENSE PI 8.0

## (undated) DEVICE — KIT TOTAL KNEE TKOFG OMC

## (undated) DEVICE — SUT ETHILON 3-0 PS2 18 BLK